# Patient Record
Sex: MALE | Race: WHITE | NOT HISPANIC OR LATINO | Employment: FULL TIME | URBAN - NONMETROPOLITAN AREA
[De-identification: names, ages, dates, MRNs, and addresses within clinical notes are randomized per-mention and may not be internally consistent; named-entity substitution may affect disease eponyms.]

---

## 2017-02-09 ENCOUNTER — APPOINTMENT (OUTPATIENT)
Dept: PHYSICAL THERAPY | Facility: CLINIC | Age: 47
End: 2017-02-09
Payer: COMMERCIAL

## 2017-02-09 PROCEDURE — 97112 NEUROMUSCULAR REEDUCATION: CPT

## 2017-02-09 PROCEDURE — 97163 PT EVAL HIGH COMPLEX 45 MIN: CPT

## 2017-02-09 PROCEDURE — 97110 THERAPEUTIC EXERCISES: CPT

## 2017-02-13 ENCOUNTER — APPOINTMENT (OUTPATIENT)
Dept: PHYSICAL THERAPY | Facility: CLINIC | Age: 47
End: 2017-02-13
Payer: COMMERCIAL

## 2017-02-13 PROCEDURE — 97112 NEUROMUSCULAR REEDUCATION: CPT

## 2017-02-14 ENCOUNTER — APPOINTMENT (OUTPATIENT)
Dept: PHYSICAL THERAPY | Facility: CLINIC | Age: 47
End: 2017-02-14
Payer: COMMERCIAL

## 2017-02-14 PROCEDURE — 97010 HOT OR COLD PACKS THERAPY: CPT

## 2017-02-14 PROCEDURE — 97112 NEUROMUSCULAR REEDUCATION: CPT

## 2017-02-14 PROCEDURE — 97110 THERAPEUTIC EXERCISES: CPT

## 2017-02-16 ENCOUNTER — APPOINTMENT (OUTPATIENT)
Dept: PHYSICAL THERAPY | Facility: CLINIC | Age: 47
End: 2017-02-16
Payer: COMMERCIAL

## 2017-02-16 PROCEDURE — 97110 THERAPEUTIC EXERCISES: CPT

## 2017-02-20 ENCOUNTER — APPOINTMENT (OUTPATIENT)
Dept: PHYSICAL THERAPY | Facility: CLINIC | Age: 47
End: 2017-02-20
Payer: COMMERCIAL

## 2017-02-20 PROCEDURE — 97110 THERAPEUTIC EXERCISES: CPT

## 2017-02-21 ENCOUNTER — APPOINTMENT (OUTPATIENT)
Dept: PHYSICAL THERAPY | Facility: CLINIC | Age: 47
End: 2017-02-21
Payer: COMMERCIAL

## 2017-02-21 PROCEDURE — 97110 THERAPEUTIC EXERCISES: CPT

## 2017-02-23 ENCOUNTER — APPOINTMENT (OUTPATIENT)
Dept: PHYSICAL THERAPY | Facility: CLINIC | Age: 47
End: 2017-02-23
Payer: COMMERCIAL

## 2017-02-27 ENCOUNTER — APPOINTMENT (OUTPATIENT)
Dept: PHYSICAL THERAPY | Facility: CLINIC | Age: 47
End: 2017-02-27
Payer: COMMERCIAL

## 2017-02-27 PROCEDURE — 97110 THERAPEUTIC EXERCISES: CPT

## 2017-02-28 ENCOUNTER — APPOINTMENT (OUTPATIENT)
Dept: PHYSICAL THERAPY | Facility: CLINIC | Age: 47
End: 2017-02-28
Payer: COMMERCIAL

## 2017-02-28 PROCEDURE — 97110 THERAPEUTIC EXERCISES: CPT

## 2017-03-02 ENCOUNTER — APPOINTMENT (OUTPATIENT)
Dept: PHYSICAL THERAPY | Facility: CLINIC | Age: 47
End: 2017-03-02
Payer: COMMERCIAL

## 2017-03-02 PROCEDURE — 97110 THERAPEUTIC EXERCISES: CPT

## 2017-03-06 ENCOUNTER — APPOINTMENT (OUTPATIENT)
Dept: PHYSICAL THERAPY | Facility: CLINIC | Age: 47
End: 2017-03-06
Payer: COMMERCIAL

## 2017-03-06 PROCEDURE — 97110 THERAPEUTIC EXERCISES: CPT

## 2017-03-08 ENCOUNTER — APPOINTMENT (OUTPATIENT)
Dept: PHYSICAL THERAPY | Facility: CLINIC | Age: 47
End: 2017-03-08
Payer: COMMERCIAL

## 2017-03-08 PROCEDURE — 97110 THERAPEUTIC EXERCISES: CPT

## 2017-03-10 ENCOUNTER — APPOINTMENT (OUTPATIENT)
Dept: PHYSICAL THERAPY | Facility: CLINIC | Age: 47
End: 2017-03-10
Payer: COMMERCIAL

## 2017-03-10 PROCEDURE — 97110 THERAPEUTIC EXERCISES: CPT

## 2017-03-13 ENCOUNTER — APPOINTMENT (OUTPATIENT)
Dept: PHYSICAL THERAPY | Facility: CLINIC | Age: 47
End: 2017-03-13
Payer: COMMERCIAL

## 2017-03-13 PROCEDURE — 97110 THERAPEUTIC EXERCISES: CPT

## 2017-03-14 ENCOUNTER — APPOINTMENT (OUTPATIENT)
Dept: PHYSICAL THERAPY | Facility: CLINIC | Age: 47
End: 2017-03-14
Payer: COMMERCIAL

## 2017-03-15 ENCOUNTER — APPOINTMENT (OUTPATIENT)
Dept: PHYSICAL THERAPY | Facility: CLINIC | Age: 47
End: 2017-03-15
Payer: COMMERCIAL

## 2017-03-16 ENCOUNTER — APPOINTMENT (OUTPATIENT)
Dept: PHYSICAL THERAPY | Facility: CLINIC | Age: 47
End: 2017-03-16
Payer: COMMERCIAL

## 2017-03-16 PROCEDURE — 97110 THERAPEUTIC EXERCISES: CPT

## 2017-03-21 ENCOUNTER — APPOINTMENT (OUTPATIENT)
Dept: PHYSICAL THERAPY | Facility: CLINIC | Age: 47
End: 2017-03-21
Payer: COMMERCIAL

## 2017-03-21 PROCEDURE — 97110 THERAPEUTIC EXERCISES: CPT

## 2017-03-22 ENCOUNTER — APPOINTMENT (OUTPATIENT)
Dept: PHYSICAL THERAPY | Facility: CLINIC | Age: 47
End: 2017-03-22
Payer: COMMERCIAL

## 2017-03-22 PROCEDURE — 97110 THERAPEUTIC EXERCISES: CPT

## 2017-03-23 ENCOUNTER — APPOINTMENT (OUTPATIENT)
Dept: PHYSICAL THERAPY | Facility: CLINIC | Age: 47
End: 2017-03-23
Payer: COMMERCIAL

## 2017-03-23 PROCEDURE — 97110 THERAPEUTIC EXERCISES: CPT

## 2017-03-28 ENCOUNTER — APPOINTMENT (OUTPATIENT)
Dept: PHYSICAL THERAPY | Facility: CLINIC | Age: 47
End: 2017-03-28
Payer: COMMERCIAL

## 2017-03-28 PROCEDURE — 97110 THERAPEUTIC EXERCISES: CPT

## 2017-03-30 ENCOUNTER — APPOINTMENT (OUTPATIENT)
Dept: PHYSICAL THERAPY | Facility: CLINIC | Age: 47
End: 2017-03-30
Payer: COMMERCIAL

## 2017-03-30 PROCEDURE — 97110 THERAPEUTIC EXERCISES: CPT

## 2017-08-22 ENCOUNTER — ALLSCRIPTS OFFICE VISIT (OUTPATIENT)
Dept: OTHER | Facility: OTHER | Age: 47
End: 2017-08-22

## 2017-09-05 ENCOUNTER — ALLSCRIPTS OFFICE VISIT (OUTPATIENT)
Dept: OTHER | Facility: OTHER | Age: 47
End: 2017-09-05

## 2017-09-05 DIAGNOSIS — E66.9 OBESITY: ICD-10-CM

## 2017-09-05 DIAGNOSIS — I10 ESSENTIAL (PRIMARY) HYPERTENSION: ICD-10-CM

## 2017-09-05 DIAGNOSIS — Q98.4 KLINEFELTER SYNDROME: ICD-10-CM

## 2017-09-05 DIAGNOSIS — E11.9 TYPE 2 DIABETES MELLITUS WITHOUT COMPLICATIONS (HCC): ICD-10-CM

## 2017-09-06 ENCOUNTER — LAB CONVERSION - ENCOUNTER (OUTPATIENT)
Dept: OTHER | Facility: OTHER | Age: 47
End: 2017-09-06

## 2017-09-06 LAB
FSH (HISTORICAL): 8.57 (ref 1.27–19.26)
LUTEINIZING HORMONE (HISTORICAL): 7.91 (ref 1.24–8.62)

## 2017-09-24 ENCOUNTER — HOSPITAL ENCOUNTER (EMERGENCY)
Facility: HOSPITAL | Age: 47
Discharge: HOME | End: 2017-09-24
Attending: EMERGENCY MEDICINE | Admitting: EMERGENCY MEDICINE
Payer: COMMERCIAL

## 2017-09-24 ENCOUNTER — APPOINTMENT (EMERGENCY)
Dept: RADIOLOGY | Facility: HOSPITAL | Age: 47
End: 2017-09-24
Payer: COMMERCIAL

## 2017-09-24 VITALS
TEMPERATURE: 97.4 F | SYSTOLIC BLOOD PRESSURE: 126 MMHG | HEART RATE: 52 BPM | WEIGHT: 315 LBS | RESPIRATION RATE: 33 BRPM | DIASTOLIC BLOOD PRESSURE: 70 MMHG | OXYGEN SATURATION: 96 %

## 2017-09-24 DIAGNOSIS — R07.89 CHEST WALL PAIN: Primary | ICD-10-CM

## 2017-09-24 LAB
ALBUMIN SERPL BCP-MCNC: 3.6 G/DL (ref 3.5–5)
ALP SERPL-CCNC: 54 U/L (ref 46–116)
ALT SERPL W P-5'-P-CCNC: 59 U/L (ref 12–78)
ANION GAP SERPL CALCULATED.3IONS-SCNC: 10 MMOL/L (ref 4–13)
AST SERPL W P-5'-P-CCNC: 25 U/L (ref 5–45)
BASOPHILS # BLD AUTO: 0.04 THOUSANDS/ΜL (ref 0–0.1)
BASOPHILS NFR BLD AUTO: 1 % (ref 0–1)
BILIRUB SERPL-MCNC: 1.1 MG/DL (ref 0.2–1)
BUN SERPL-MCNC: 19 MG/DL (ref 5–25)
CALCIUM SERPL-MCNC: 8.4 MG/DL (ref 8.3–10.1)
CHLORIDE SERPL-SCNC: 105 MMOL/L (ref 100–108)
CO2 SERPL-SCNC: 26 MMOL/L (ref 21–32)
CREAT SERPL-MCNC: 1.11 MG/DL (ref 0.6–1.3)
EOSINOPHIL # BLD AUTO: 0.35 THOUSAND/ΜL (ref 0–0.61)
EOSINOPHIL NFR BLD AUTO: 5 % (ref 0–6)
ERYTHROCYTE [DISTWIDTH] IN BLOOD BY AUTOMATED COUNT: 13.1 % (ref 11.6–15.1)
GFR SERPL CREATININE-BSD FRML MDRD: 79 ML/MIN/1.73SQ M
GLUCOSE SERPL-MCNC: 219 MG/DL (ref 65–140)
HCT VFR BLD AUTO: 39 % (ref 36.5–49.3)
HGB BLD-MCNC: 13.4 G/DL (ref 12–17)
LYMPHOCYTES # BLD AUTO: 3.27 THOUSANDS/ΜL (ref 0.6–4.47)
LYMPHOCYTES NFR BLD AUTO: 44 % (ref 14–44)
MCH RBC QN AUTO: 29.6 PG (ref 26.8–34.3)
MCHC RBC AUTO-ENTMCNC: 34.4 G/DL (ref 31.4–37.4)
MCV RBC AUTO: 86 FL (ref 82–98)
MONOCYTES # BLD AUTO: 0.5 THOUSAND/ΜL (ref 0.17–1.22)
MONOCYTES NFR BLD AUTO: 7 % (ref 4–12)
NEUTROPHILS # BLD AUTO: 3.16 THOUSANDS/ΜL (ref 1.85–7.62)
NEUTS SEG NFR BLD AUTO: 43 % (ref 43–75)
PLATELET # BLD AUTO: 179 THOUSANDS/UL (ref 149–390)
PMV BLD AUTO: 10.7 FL (ref 8.9–12.7)
POTASSIUM SERPL-SCNC: 3.8 MMOL/L (ref 3.5–5.3)
PROT SERPL-MCNC: 6.8 G/DL (ref 6.4–8.2)
RBC # BLD AUTO: 4.53 MILLION/UL (ref 3.88–5.62)
SODIUM SERPL-SCNC: 141 MMOL/L (ref 136–145)
TROPONIN I SERPL-MCNC: <0.02 NG/ML
TROPONIN I SERPL-MCNC: <0.02 NG/ML
WBC # BLD AUTO: 7.32 THOUSAND/UL (ref 4.31–10.16)

## 2017-09-24 PROCEDURE — 84484 ASSAY OF TROPONIN QUANT: CPT | Performed by: EMERGENCY MEDICINE

## 2017-09-24 PROCEDURE — 85025 COMPLETE CBC W/AUTO DIFF WBC: CPT | Performed by: EMERGENCY MEDICINE

## 2017-09-24 PROCEDURE — 93005 ELECTROCARDIOGRAM TRACING: CPT | Performed by: EMERGENCY MEDICINE

## 2017-09-24 PROCEDURE — 99285 EMERGENCY DEPT VISIT HI MDM: CPT

## 2017-09-24 PROCEDURE — 80053 COMPREHEN METABOLIC PANEL: CPT | Performed by: EMERGENCY MEDICINE

## 2017-09-24 PROCEDURE — 71020 HB CHEST X-RAY 2VW FRONTAL&LATL: CPT

## 2017-09-24 PROCEDURE — 36415 COLL VENOUS BLD VENIPUNCTURE: CPT | Performed by: EMERGENCY MEDICINE

## 2017-09-24 PROCEDURE — 96374 THER/PROPH/DIAG INJ IV PUSH: CPT

## 2017-09-24 RX ORDER — KETOROLAC TROMETHAMINE 30 MG/ML
30 INJECTION, SOLUTION INTRAMUSCULAR; INTRAVENOUS ONCE
Status: COMPLETED | OUTPATIENT
Start: 2017-09-24 | End: 2017-09-24

## 2017-09-24 RX ORDER — IBUPROFEN 800 MG/1
800 TABLET ORAL EVERY 6 HOURS PRN
Qty: 30 TABLET | Refills: 0 | Status: SHIPPED | OUTPATIENT
Start: 2017-09-24 | End: 2022-02-05 | Stop reason: HOSPADM

## 2017-09-24 RX ADMIN — KETOROLAC TROMETHAMINE 30 MG: 30 INJECTION, SOLUTION INTRAMUSCULAR at 02:40

## 2017-09-25 ENCOUNTER — ALLSCRIPTS OFFICE VISIT (OUTPATIENT)
Dept: OTHER | Facility: OTHER | Age: 47
End: 2017-09-25

## 2017-09-25 LAB
ATRIAL RATE: 58 BPM
ATRIAL RATE: 59 BPM
P AXIS: 26 DEGREES
P AXIS: 29 DEGREES
PR INTERVAL: 154 MS
PR INTERVAL: 154 MS
QRS AXIS: 12 DEGREES
QRS AXIS: 9 DEGREES
QRSD INTERVAL: 94 MS
QRSD INTERVAL: 96 MS
QT INTERVAL: 426 MS
QT INTERVAL: 434 MS
QTC INTERVAL: 421 MS
QTC INTERVAL: 426 MS
T WAVE AXIS: 2 DEGREES
T WAVE AXIS: 5 DEGREES
VENTRICULAR RATE: 58 BPM
VENTRICULAR RATE: 59 BPM

## 2017-10-11 ENCOUNTER — GENERIC CONVERSION - ENCOUNTER (OUTPATIENT)
Dept: INTERNAL MEDICINE CLINIC | Facility: CLINIC | Age: 47
End: 2017-10-11

## 2017-10-11 ENCOUNTER — GENERIC CONVERSION - ENCOUNTER (OUTPATIENT)
Dept: OTHER | Facility: OTHER | Age: 47
End: 2017-10-11

## 2017-10-11 DIAGNOSIS — E29.1 TESTICULAR HYPOFUNCTION: ICD-10-CM

## 2017-10-11 DIAGNOSIS — E11.65 TYPE 2 DIABETES MELLITUS WITH HYPERGLYCEMIA (HCC): ICD-10-CM

## 2017-10-11 DIAGNOSIS — E11.9 TYPE 2 DIABETES MELLITUS WITHOUT COMPLICATIONS (HCC): ICD-10-CM

## 2017-10-27 NOTE — CONSULTS
Assessment  1  Klinefelter's syndrome (758 7) (Q98 4)  2  Hypogonadism, male (257 2) (E29 1)1   3  1   4  Sleep disturbances (780 50) (G47 9)  5  Diabetes mellitus type 2, uncontrolled (250 02) (E11 65)     1 Amended By: Alfredo Farias; Sep 26 2017 3:34 PM EST    Plan  Hypertension    · Stop: Losartan Potassium 50 MG Oral Tablet  Rx By: Rip Mars; Dispense: 30 Days ; #:30 Tablet; Refill: 2;For: Hypertension;   JEFFREY = N; Verified Transmission to [facility]; Last Updated By:   Juan Antonio Gresham; 9/25/2017 8:31:40 AM  Hypertension, Klinefelter's syndrome    · (1) T4, FREE; Status:Active; Requested ARLETH:26STG6540;   Perform:[facility]; Due:24Xbb6555; Ordered; For:Hypertension,   Klinefelter's syndrome; Ordered By:Carl Harper;   · (1) TSH; Status:Active; Requested IAT:37KVC0827;   Perform:[facility]; Due:54Bxt6039; Ordered; For:Hypertension,   Klinefelter's syndrome; Ordered By:Carl Harper; Hypogonadism    · Follow-up visit in 2 months Evaluation and Treatment  Follow-up  Status: Complete   Done: 91UEX5036  Ordered; For: Hypogonadism;  Ordered By: Alfredo Farias  Performed:   Due:   64JXI4429; Last Updated By: Janene Castellanos; 9/25/2017 9:32:43 AM  Hypogonadism, Klinefelter's syndrome    · (1) TESTOSTERONE, FREE (DIRECT) AND TOTAL; Status:Active; Requested  HYT:04UZC1372;   Perform:[facility]; Due:56Uda4039; Ordered; For:Hypogonadism,   Klinefelter's syndrome; Ordered By:Carl Harper;  Klinefelter's syndrome    · (1) CBC/PLT/DIFF; Status:Active; Requested QUE:26DFU5161;   Perform:[facility]; Due:64Ikz3627; Ordered; For:Klinefelter's syndrome;   Ordered By:Carl Harper;    Discussion/Summary  Hypogonadism secondary to Klinefelter syndrome-  repeat labs including total and free testosterone,  thyroid function test and CBC  I have suggested starting testosterone hormone replacement after that  Discussed options of gels,  patches or injection    he is agreeable to starting AndroGel, patient handout given  after labs will start him on AndroGel,  counseled on side effects including need for monitoring hemoglobin hematocrit, urinary symptoms, edema and risk of blood clots  will repeat total and free testosterone as well as hemoglobin and hematocrit after he has been on a consistent dose for about 4 weeks  consider DEXA scan in the future   sleep disturbances -likely sleep apnea -  suggested evaluation with a sleep study, he is reluctant, suggest discussing with primary care   uncontrolled type 2 diabetes - not taking medications on a regular basis secondary to side effects of diarrhea with metformin, not checking blood sugars, being managed by primary care,  advised to follow up with primary care regarding further management   Counseling Documentation With Imm: The patient was counseled regarding diagnostic results,-- instructions for management,-- risk factor reductions,-- impressions,-- risks and benefits of treatment options,-- importance of compliance with treatment  Medication SE Review and Pt Understands Tx: Possible side effects of new medications were reviewed with the patient/guardian today  The treatment plan was reviewed with the patient/guardian  The patient/guardian understands and agrees with the treatment plan   Patient's Capacity to Self-Care: Patient is able to Self-Care  Self Referrals:   Self Referrals: No      Chief Complaint  Chief Complaint Free Text Note Form: Consult  History of Present Illness  HPI: 78-year-old gentleman referred here for evaluation of hypogonadism  He states that he was diagnosed with Klinefelter syndrome in 1989 - during workup for small testicles noted on physical exam as well as infertility  was on testosterone injections for 2 years - Stopped taking it after his physician retired  he complains of decrease in libido many years , ED ? , c/o fatigue once or twice a month- unchanged  stable - trying to loose weight , lost 50 lbs in 3 years   issues , snores - has never been tested for sleep apnea  h/o CAD   h/o fragility fracture , issues , doesn?t have any drive to do anything   history of type 2 diabetes -recently diagnosed -started on oral hypoglycemics by primary care, not taking meds on a regular basis, does not check his blood sugars at home       Review of Systems  Endo Adult ROS Male New Patient:   Constitutional/General: no change in ring size,-- no change in shoe size,-- no chills,-- no dizziness,-- no fainting,-- no fatigue,-- no fever,-- no forgetfulness,-- no headache,-- loss of sleep,-- no recent weight loss,-- no nervousness,-- no numbness,-- no temperature intolerance,-- no excessive sweating-- and-- no weight gain  Muscle/Joint/Bone: no arm pain,-- no back pain,-- no hip pain,-- no leg pain,-- no foot pain,-- no neck pain,-- no hand pain,-- no shoulder pain,-- no arm weakness,-- no back weakness,-- no hip weakness,-- no leg weakness,-- no foot weakness,-- no neck weakness,-- no hand weakness,-- no shoulder weakness,-- no arm numbness,-- no back numbness,-- no hip numbness,-- no leg numbness,-- no foot numbness,-- no neck numbness,-- no hand numbness-- and-- no shoulder numbness  Gastrointestinal: no constipation,-- no diarrhea,-- no excessive hunger,-- no excessive thirst,-- no nausea,-- no poor appetite,-- no rectal bleeding,-- no stomach pain,-- no vomiting-- and-- no vomiting blood  Cardiovascular: chest pain-- and-- hypertension, but-- no irregular heart beat,-- no hypotension,-- no poor circulation,-- no rapid heart beat-- and-- no ankle swelling  Eye/Ear/Nose/Throat: no bleeding gums,-- no blurred vision,-- no difficulty swallowing,-- no double vision,-- no gritty eyes,-- no hoarseness,-- no hearing loss,-- no persistent cough,-- no sinus problems,-- not seeing flashes-- and-- not seeing halos     Skin: no easy bruising,-- no hives,-- no itching,-- no change in a mole,-- no rashes,-- no scar-- and-- no slow healing sores  Genitourinary no blood in urine,-- no frequent urination,-- no night time urination-- and-- no painful urination  Genitourinary - Reproductive no breast lump-- and-- no erection difficulties  ROS Reviewed:   ROS reviewed  Active Problems  1  Diabetes mellitus, type 2 (250 00) (E11 9)  2  Hypertension (401 9) (I10)  3  Hypogonadism  4  Nephrolithiasis (592 0) (N20 0)  5  Obesity (278 00) (E66 9)  6  Refused influenza vaccine (V64 06) (Z28 21)  7  Screening for depression (V79 0) (Z13 89)    Past Medical History  Active Problems And Past Medical History Reviewed: The active problems and past medical history were reviewed and updated today  Surgical History  1  History of Cystoscopy With Insertion Of Ureteral Stent  2  History of Cystoscopy With Ureteroscopy With Removal Of Calculus  3  History of Cystoscopy With Ureteroscopy With Removal Of Calculus  4  History of Knee Surgery  5  History of Tonsillectomy  Surgical History Reviewed: The surgical history was reviewed and updated today  Family History  Mother   1  Family history of Diabetes Mellitus (V18 0)  2  Family history of Hypertension (V17 49)  3  Family history of Stroke Syndrome (V17 1)  Father   4  Family history of Hypertension (V17 49)  Family History Reviewed: The family history was reviewed and updated today  Social History   · Always uses seat belt   · Always uses sunscreen   · Caffeine use (V49 89) (F15 90)   · Dental care, regularly   · Feels safe at home   · Lives independently with spouse   · Marital History - Currently    · Never a smoker   · No illicit drug use   · Occasional alcohol use  Social History Reviewed: The social history was reviewed and updated today  Current Meds  1  GlyBURIDE-MetFORMIN 2 5-500 MG Oral Tablet; TAKE 1 TABLET DAILY; Therapy: 01SDI8960 to (Gaby Fernandez)  Requested for: 13Zye8329; Last   Rx:25Pav3018 Ordered  2   Losartan Potassium 25 MG Oral Tablet; Take one tablet daily; Therapy: 19XIF7557 to (Ewa Peon)  Requested for: 79Wjp2355; Last   Rx:69Kos4527 Ordered  3  Losartan Potassium 50 MG Oral Tablet; TAKE 1 TABLET DAILY; Therapy: 77Ppy7618 to (Verdie Meter)  Requested for: 68Hvf7466; Last   Rx:72Pco0423 Ordered  Medication List Reviewed: The medication list was reviewed and updated today  Allergies  1  Penicillins    Vitals  Vital Signs    Recorded: 96LCB0480 08:31AM   Heart Rate 80   Systolic 288   Diastolic 80   Height 6 ft 1 in   Weight 333 lb 8 0 oz   BMI Calculated 44   BSA Calculated 2 67     Physical Exam    Constitutional   General appearance: No acute distress, well appearing and well nourished  Eyes   Conjunctiva and lids: No swelling, erythema, or discharge  Pupils: Equal, round and reactive to light  The sclera are anicteric  Extraocular movements are intact  Ears, Nose, Mouth, and Throat   External inspection of ears, nose and lips: Normal     Exam of Head: The head is atraumatic and normocephalic  Neck: The neck is supple  The thyroid is normal in size with no palpable nodules  Pulmonary   Auscultation of lungs: Clear to auscultation bilaterally with normal chest expansion  Cardiovascular   Auscultation of heart: Normal rate and rhythm with no murmurs, gallops or rubs  Examination of extremities for edema and/or varicosities: Normal     Abdomen   Abdomen: Abdomen is soft, non-tender with normal bowel sounds  Lymphatic   Palpation of lymph nodes: No supraclavicular or suboccipital lymphadenopathy  Musculoskeletal   Inspection/palpation of joints, bones, and muscles: Muscle bulk and tone is normal     Skin   Skin and subcutaneous tissue: Abnormal  -- minimal facial hair  Neurologic   Motor Strength: Strength is 5/5 bilaterally      Psychiatric   Orientation to person, place and time: Normal     Mood and affect: Affect and attention span are normal        Results/Data  Office Record Review: I have reviewed the office records and my summary is as follows: reviewed office notes from urologist from February 2014 regarding nephrolithiasis   (1) PSA (SCREEN) (Dx V76 44 Screen for Prostate Cancer) 06Hqe5636 12:00AM      Test Name Result Flag Reference   PSA 0 09  0 0-4 0     (Q) [facility] AND  45Aam5210 12:00AM      Test Name Result Flag Reference   [facility] 8 57  1 27-19 26   LH 7 91  1 24-8 62     (1) PROLACTIN 84Zcr7151 12:00AM      Test Name Result Flag Reference   PROLACTIN 2 97  2 64-13 13     (1) TESTOSTERONE 20Mnk5327 12:00AM      Test Name Result Flag Reference   TESTOSTERONE 0 14 L 1 75-7 81     (1) BASIC METABOLIC PROFILE 49QJD6938 12:00AM      Test Name Result Flag Reference   GLUCOSE,RANDM 228 H 65-99   SODIUM 139  134-143   POTASSIUM 4 1  3 5-5 5   CHLORIDE 105     CARBON DIOXIDE 25  21 0-31 0   ANION GAP (CALC) 13 1     BLOOD UREA NITROGEN 19  7-25   CREATININE 0 88  0 7-1 3   CALCIUM 9 3  8 6-10 5   eGFR African American >60  >60   eGFR Non-African American >60  >60     Future Appointments    Date/Time Provider Specialty Site   10/11/2017 04:30 PM Milad Marshall DO Internal Medicine [address]   11/22/2017 08:30 AM Lynn Larry, [facility]     Signatures   Electronically signed by : MARYBEL Umaña ; Sep 26 2017  3:33PM EST                       (Author)    Electronically signed by : MARYBEL Umaña ; Sep 26 2017  3:34PM EST                       (Author)

## 2017-11-22 ENCOUNTER — ALLSCRIPTS OFFICE VISIT (OUTPATIENT)
Dept: OTHER | Facility: OTHER | Age: 47
End: 2017-11-22

## 2018-01-03 DIAGNOSIS — E66.9 OBESITY: ICD-10-CM

## 2018-01-03 DIAGNOSIS — I10 ESSENTIAL (PRIMARY) HYPERTENSION: ICD-10-CM

## 2018-01-03 DIAGNOSIS — E11.9 TYPE 2 DIABETES MELLITUS WITHOUT COMPLICATIONS (HCC): ICD-10-CM

## 2018-01-11 ENCOUNTER — ALLSCRIPTS OFFICE VISIT (OUTPATIENT)
Dept: OTHER | Facility: OTHER | Age: 48
End: 2018-01-11

## 2018-01-11 NOTE — MISCELLANEOUS
Provider Comments  Provider Comments:   PATIENT NO SHOWED FOR 11- APPT  Signatures   Electronically signed by :  Corina Ng; Nov 22 2017  8:55AM EST                       (Author)

## 2018-01-13 NOTE — PROGRESS NOTES
Assessment   1  Diabetes mellitus, type 2 (250 00) (E11 9)  2  History of renal calculi (V13 01) (Z87 442)1   3  Obesity (278 00) (E66 9)1      1 Amended By: Renu Harden; Jan 11 2018 9:42 PM EST      Plan   Diabetes mellitus type 2, uncontrolled    · Stop: GlyBURIDE 5 MG Oral Tablet  Diabetes mellitus, type 2    · Stop: GlyBURIDE-MetFORMIN 2 5-500 MG Oral Tablet   · (1) HEMOGLOBIN A1C; Status:Active; Requested CALLUM:40XVV7514;   Diabetes mellitus, type 2, Hypertension    · Follow-up visit in 4 Months Evaluation and Treatment  Follow-up  Status: Hold For - Scheduling     Requested for: 88DWO7060  Diabetes mellitus, type 2, Obesity    · (1) LDL,DIRECT; Status:Active; Requested JGS:74LIC4176;   Hypertension    · (1) COMPREHENSIVE METABOLIC PANEL; Status:Active; Requested ASE:53XCO2082; Discussion/Summary   Discussion Summary:    Patient agrees to work on diet changes and at least portion control  drink more water  He is agreeable to labs and said he has to get an xray for nephrology  He is still hoping to move [address] t some point and hs started workng towards it  Continue blood pressure rx  1              Medication SE Review and Pt Understands Tx: Possible side effects of new medications were reviewed with the patient/guardian today  The treatment plan was reviewed with the patient/guardian  The patient/guardian understands and agrees with the treatment plan             Self Referrals:    Self Referrals: No       1 Amended By: Renu Harden; Jan 11 2018 9:46 PM EST      Chief Complaint   Chief Complaint Free Text Note Form: Pt presents today for a follow up visit  Pt states that he no longer uses glyburide, it caused him to feel fatigued and have heartburn  States that he currently only takes losartan, unsure if he will start androgel  No new allergies reported  Notes that he has difficulty committing to routines with his current career  Has been drinking water, and taking steps to healthier lifestyle  History of Present Illness   HPI: Patient stopped glyburide because he thinks it caused heartburn although now he is wondering if it was his diet  He does not check 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  No chest pain or sob  He has followup with nephrology upcoming  No n/v/d 1        1 Amended By: Maria Fernanda López; Jan 11 2018 9:40 PM EST      Review of Systems   Complete-Male:      Constitutional:1  No fever or chills, feels well, no tiredness, no recent weight gain or weight loss1   Eyes:1  No complaints of eye pain, no red eyes, no discharge from eyes, no itchy eyes1   ENT:1  no complaints of earache, no hearing loss, no nosebleeds, no nasal discharge, no sore throat, no hoarseness1   Cardiovascular: 1  No complaints of slow heart rate, no fast heart rate, no chest pain, no palpitations, no leg claudication, no lower extremity1   Respiratory:1  No complaints of shortness of breath, no wheezing, no cough, no SOB on exertion, no orthopnea or PND1   Gastrointestinal:1  No complaints of abdominal pain, no constipation, no nausea or vomiting, no diarrhea or bloody stools1   Genitourinary:1  No complaints of dysuria, no incontinence, no hesitancy, no nocturia, no genital lesion, no testicular pain1   Musculoskeletal:1  No complaints of arthralgia, no myalgias, no joint swelling or stiffness, no limb pain or swelling1   Integumentary:1  No complaints of skin rash or skin lesions, no itching, no skin wound, no dry skin1   Neurological:1  No compliants of headache, no confusion, no convulsions, no numbness or tingling, no dizziness or fainting, no limb weakness, no difficulty walking1   Psychiatric:1  Is not suicidal, no sleep disturbances, no anxiety or depression, no change in personality, no emotional problems1         Endocrine:1  No complaints of proptosis, no hot flashes, no muscle weakness, no erectile dysfunction, no deepening of the voice, no feelings of weakness1   Hematologic/Lymphatic:1  No complaints of swollen glands, no swollen glands in the neck, does not bleed easily, no easy bruising1         1 Amended By: Rhonda Jacobs; Jan 11 2018 9:42 PM EST      Active Problems   1  Diabetes mellitus type 2, uncontrolled (250 02) (E11 65)  2  Diabetes mellitus, type 2 (250 00) (E11 9)  3  Hypertension (401 9) (I10)  4  Hypogonadism, male (257 2) (E29 1)  5  Klinefelter's syndrome (758 7) (Q98 4)  6  Nephrolithiasis (592 0) (N20 0)  7  Obesity (278 00) (E66 9)  8  Refused influenza vaccine (V64 06) (Z28 21)  9  Screening for depression (V79 0) (Z13 89)  10  Sleep disturbances (780 50) (G47 9)    Past Medical History   Active Problems And Past Medical History Reviewed: The active problems and past medical history were reviewed and updated today  Surgical History   1  History of Cystoscopy With Insertion Of Ureteral Stent  2  History of Cystoscopy With Ureteroscopy With Removal Of Calculus  3  History of Cystoscopy With Ureteroscopy With Removal Of Calculus  4  History of Knee Surgery  5  History of Tonsillectomy  Surgical History Reviewed: The surgical history was reviewed and updated today  Family History   Mother   1  Family history of Diabetes Mellitus (V18 0)  2  Family history of Hypertension (V17 49)  3  Family history of Stroke Syndrome (V17 1)  Father   4  Family history of Hypertension (V17 49)  Family History Reviewed: The family history was reviewed and updated today  Social History    · Always uses seat belt   · Always uses sunscreen   · Caffeine use (V49 89) (F15 90)   · Dental care, regularly   · Feels safe at home   · Lives independently with spouse   · Marital History - Currently    · Never a smoker   · No illicit drug use   · Occasional alcohol use  Social History Reviewed: The social history was reviewed and updated today  The social history was reviewed and is unchanged        Current Meds   1  AndroGel Pump 20 25 MG/ACT (1 62%) Transdermal Gel; apply 1 pump to shoulders and upper     arms daily; Therapy: 54CWI9757 to (Evaluate:15Apr2018); Last Rx:34Swb6376 Ordered  2  GlyBURIDE 5 MG Oral Tablet; TAKE 1 TABLET DAILY; Therapy: 60LCT5719 to (60 124 37 75)  Requested for: 94BMD1564; Last Rx:85Ccw6750     Ordered  3  GlyBURIDE-MetFORMIN 2 5-500 MG Oral Tablet; TAKE 1 TABLET DAILY; Therapy: 19AAX8203 to (Evaluate:40Gxi1372)  Requested for: 71Ibo3885; Last Rx:49Lls2954     Ordered  4  Losartan Potassium 25 MG Oral Tablet; Take one tablet daily; Therapy: 92DJN3171 to (Abeworth Aga)  Requested for: 80Zef5144; Last Rx:70Vwb4866     Ordered  Medication List Reviewed: The medication list was reviewed and updated today  Allergies   1  Penicillins    Vitals   Vital Signs    Recorded: 03EOG1316 05:18PM Recorded: 46EWR7835 05:05PM   Temperature  96 6 F   Heart Rate  80   Systolic 681    Diastolic 74    Height  6 ft 1 in   Weight  338 lb 4 0 oz   BMI Calculated  44 63   BSA Calculated  2 69   O2 Saturation  98     Physical Exam        Constitutional      General appearance: No acute distress, well appearing and well nourished  Eyes      Conjunctiva and lids: No swelling, erythema, or discharge  Pupils and irises: Equal, round and reactive to light  Ears, Nose, Mouth, and Throat      External inspection of ears and nose: Normal        Otoscopic examination: Tympanic membrance translucent with normal light reflex  Canals patent without erythema  Nasal mucosa, septum, and turbinates: Normal without edema or erythema  Oropharynx: Normal with no erythema, edema, exudate or lesions  Pulmonary      Respiratory effort: No increased work of breathing or signs of respiratory distress  Auscultation of lungs: Clear to auscultation, equal breath sounds bilaterally, no wheezes, no rales, no rhonci         Cardiovascular      Palpation of heart: Normal PMI, no thrills  Auscultation of heart: Normal rate and rhythm, normal S1 and S2, without murmurs  Examination of extremities for edema and/or varicosities: Normal        Carotid pulses: Normal        Abdomen      Abdomen: Non-tender, no masses  Liver and spleen: No hepatomegaly or splenomegaly  Lymphatic      Palpation of lymph nodes in neck: No lymphadenopathy  Musculoskeletal      Gait and station: Normal        Digits and nails: Normal without clubbing or cyanosis  Inspection/palpation of joints, bones, and muscles: Normal        Skin      Skin and subcutaneous tissue: Normal without rashes or lesions  Neurologic      Cranial nerves: Cranial nerves 2-12 intact  Reflexes: 2+ and symmetric  Sensation: No sensory loss         Psychiatric      Orientation to person, place and time: Normal        Mood and affect: Normal           Signatures    Electronically signed by : Compa Minaya DO; Jan 11 2018  5:26PM EST                       (Author)     Electronically signed by : Compa Minaya DO; Jan 11 2018  9:46PM EST                       (Author)

## 2018-01-14 VITALS
HEART RATE: 80 BPM | BODY MASS INDEX: 41.75 KG/M2 | WEIGHT: 315 LBS | DIASTOLIC BLOOD PRESSURE: 80 MMHG | SYSTOLIC BLOOD PRESSURE: 114 MMHG | HEIGHT: 73 IN

## 2018-01-14 VITALS
BODY MASS INDEX: 41.75 KG/M2 | DIASTOLIC BLOOD PRESSURE: 82 MMHG | HEART RATE: 79 BPM | TEMPERATURE: 96.9 F | WEIGHT: 315 LBS | SYSTOLIC BLOOD PRESSURE: 164 MMHG | HEIGHT: 73 IN | OXYGEN SATURATION: 98 %

## 2018-01-15 VITALS
DIASTOLIC BLOOD PRESSURE: 70 MMHG | WEIGHT: 315 LBS | BODY MASS INDEX: 41.75 KG/M2 | OXYGEN SATURATION: 97 % | HEIGHT: 73 IN | HEART RATE: 81 BPM | SYSTOLIC BLOOD PRESSURE: 142 MMHG | TEMPERATURE: 96.4 F

## 2018-01-15 NOTE — CONSULTS
I had the pleasure of evaluating your patient, Jennifer Field  My full evaluation follows:      Chief Complaint  Consult  History of Present Illness  52year-old gentleman referred here for evaluation of hypogonadism  He states that he was diagnosed with Klinefelter syndrome in 1989 - during workup for small testicles noted on physical exam as well as infertility  was on testosterone injections for 2 years - Stopped taking it after his physician retired  he complains of decrease in libido many years , ED ? , c/o fatigue   Shaves once or twice a month- unchanged  weight stable - trying to loose weight , lost 50 lbs in 3 years   sleep issues , snores - has never been tested for sleep apnea  no h/o CAD   no h/o fragility fracture ,   mood issues , doesn't have any drive to do anything   history of type 2 diabetes -recently diagnosed -started on oral hypoglycemics by primary care, not taking meds on a regular basis, does not check his blood sugars at home       Review of Systems    Constitutional/General: no change in ring size, no change in shoe size, no chills, no dizziness, no fainting, no fatigue, no fever, no forgetfulness, no headache, loss of sleep, no recent weight loss, no nervousness, no numbness, no temperature intolerance, no excessive sweating and no weight gain  Muscle/Joint/Bone: no arm pain, no back pain, no hip pain, no leg pain, no foot pain, no neck pain, no hand pain, no shoulder pain, no arm weakness, no back weakness, no hip weakness, no leg weakness, no foot weakness, no neck weakness, no hand weakness, no shoulder weakness, no arm numbness, no back numbness, no hip numbness, no leg numbness, no foot numbness, no neck numbness, no hand numbness and no shoulder numbness  Gastrointestinal: no constipation, no diarrhea, no excessive hunger, no excessive thirst, no nausea, no poor appetite, no rectal bleeding, no stomach pain, no vomiting and no vomiting blood     Cardiovascular: chest pain and hypertension, but no irregular heart beat, no hypotension, no poor circulation, no rapid heart beat and no ankle swelling  Eye/Ear/Nose/Throat: no bleeding gums, no blurred vision, no difficulty swallowing, no double vision, no gritty eyes, no hoarseness, no hearing loss, no persistent cough, no sinus problems, not seeing flashes and not seeing halos  Skin: no easy bruising, no hives, no itching, no change in a mole, no rashes, no scar and no slow healing sores  Genitourinary no blood in urine, no frequent urination, no night time urination and no painful urination  Genitourinary - Reproductive no breast lump and no erection difficulties  ROS reviewed  Active Problems    1  Diabetes mellitus, type 2 (250 00) (E11 9)   2  Hypertension (401 9) (I10)   3  Hypogonadism   4  Nephrolithiasis (592 0) (N20 0)   5  Obesity (278 00) (E66 9)   6  Refused influenza vaccine (V64 06) (Z28 21)   7  Screening for depression (V79 0) (Z13 89)    Past Medical History    The active problems and past medical history were reviewed and updated today  Surgical History    · History of Cystoscopy With Insertion Of Ureteral Stent   · History of Cystoscopy With Ureteroscopy With Removal Of Calculus   · History of Cystoscopy With Ureteroscopy With Removal Of Calculus   · History of Knee Surgery   · History of Tonsillectomy    The surgical history was reviewed and updated today  Family History    · Family history of Diabetes Mellitus (V18 0)   · Family history of Hypertension (V17 49)   · Family history of Stroke Syndrome (V17 1)    · Family history of Hypertension (V17 49)    The family history was reviewed and updated today         Social History    · Always uses seat belt   · Always uses sunscreen   · Caffeine use (V49 89) (F15 90)   · Dental care, regularly   · Feels safe at home   · Lives independently with spouse   · Marital History - Currently    · Never a smoker   · No illicit drug use   · Occasional alcohol use  The social history was reviewed and updated today  Current Meds   1  GlyBURIDE-MetFORMIN 2 5-500 MG Oral Tablet; TAKE 1 TABLET DAILY; Therapy: 02APU9164 to ([facility])  Requested for: 22Wnv6317; Last   Rx:51Zpg0850 Ordered   2  Losartan Potassium 25 MG Oral Tablet; Take one tablet daily; Therapy: 00JVK5683 to (Natalie Areshiloh)  Requested for: 48Naq4954; Last   Rx:20Vnn1403 Ordered   3  Losartan Potassium 50 MG Oral Tablet; TAKE 1 TABLET DAILY; Therapy: 50Qkn2172 to (Evaluate:51Qxs6930)  Requested for: 98Svk0550; Last   Rx:[id number] Ordered    The medication list was reviewed and updated today  Allergies    1  Penicillins    Vitals   Recorded: 29RBK4040 08:31AM   Heart Rate 80   Systolic 316   Diastolic 80   Height 6 ft 1 in   Weight 333 lb 8 0 oz   BMI Calculated 44   BSA Calculated 2 67     Physical Exam    Constitutional   General appearance: No acute distress, well appearing and well nourished  Eyes   Conjunctiva and lids: No swelling, erythema, or discharge  Pupils: Equal, round and reactive to light  The sclera are anicteric  Extraocular movements are intact  Ears, Nose, Mouth, and Throat   External inspection of ears, nose and lips: Normal     Exam of Head: The head is atraumatic and normocephalic  Neck: The neck is supple  The thyroid is normal in size with no palpable nodules  Pulmonary   Auscultation of lungs: Clear to auscultation bilaterally with normal chest expansion  Cardiovascular   Auscultation of heart: Normal rate and rhythm with no murmurs, gallops or rubs  Examination of extremities for edema and/or varicosities: Normal     Abdomen   Abdomen: Abdomen is soft, non-tender with normal bowel sounds  Lymphatic   Palpation of lymph nodes: No supraclavicular or suboccipital lymphadenopathy      Musculoskeletal   Inspection/palpation of joints, bones, and muscles: Muscle bulk and tone is normal     Skin   Skin and subcutaneous tissue: Abnormal   minimal facial hair  Neurologic   Motor Strength: Strength is 5/5 bilaterally  Psychiatric   Orientation to person, place and time: Normal     Mood and affect: Affect and attention span are normal        Results/Data  I have reviewed the office records and my summary is as follows: reviewed office notes from urologist from February 2014 regarding nephrolithiasis  (1) PSA (SCREEN) (Dx V76 44 Screen for Prostate Cancer) 99Opw5998 12:00AM      Test Name Result Flag Reference   PSA 0 09  0 0-4 0     (Q) [facility] AND LH 90Jqs9190 12:00AM      Test Name Result Flag Reference   [facility] 8 57  1 27-19 26   LH 7 91  1 24-8 62     (1) PROLACTIN 60Mvq1556 12:00AM      Test Name Result Flag Reference   PROLACTIN 2 97  2 64-13 13     (1) TESTOSTERONE 35Rfi2690 12:00AM      Test Name Result Flag Reference   TESTOSTERONE 0 14 L 1 75-7 81     (1) BASIC METABOLIC PROFILE 51DTP4432 12:00AM      Test Name Result Flag Reference   GLUCOSE,RANDM 228 H 65-99   SODIUM 139  134-143   POTASSIUM 4 1  3 5-5 5   CHLORIDE 105     CARBON DIOXIDE 25  21 0-31 0   ANION GAP (CALC) 13 1     BLOOD UREA NITROGEN 19  7-25   CREATININE 0 88  0 7-1 3   CALCIUM 9 3  8 6-10 5   eGFR African American >60  >60   eGFR Non-African American >60  >60     Assessment    1  Klinefelter's syndrome (758 7) (Q98 4)   2  Hypogonadism, male (257 2) (E29 1)   3  Sleep disturbances (780 50) (G47 9)   4  Diabetes mellitus type 2, uncontrolled (250 02) (E11 65)    Plan  Hypertension    · Losartan Potassium 50 MG Oral Tablet   Rx By: Reina Mayes; Dispense: 30 Days ; #:30 Tablet; Refill: 2; For: Hypertension; JEFFREY = N; Verified Transmission to [facility]; Last Updated By: Gracy Kimble; 9/25/2017 8:31:40 AM  Hypertension, Klinefelter's syndrome    · (1) T4, FREE; Status:Active; Requested WPK:[id number];    Perform:[facility]; Due:75Kjq1580; Ordered;  For:Hypertension, Klinefelter's syndrome; Ordered By:Ashley Harper Sat;   · (1) TSH; Status:Active; Requested CIE:55VDJ4770;    Perform:[facility]; Due:02Ybx0699; Ordered;  For:Hypertension, Klinefelter's syndrome; Ordered By:Shawnee Harper; Hypogonadism    · Follow-up visit in 2 months Evaluation and Treatment  Follow-up  Status: Complete   Done: 70UHH3364   Ordered; For: Hypogonadism; Ordered By: Chuckie Cohen Performed:  Due: 30EEO2174; Last Updated By: Kush العراقي; 2017 9:32:43 AM  Hypogonadism, Klinefelter's syndrome    · (1) TESTOSTERONE, FREE (DIRECT) AND TOTAL; Status:Active; Requested  [id number];    Perform:[facility]; Due:59Zzs2707; Ordered;  For:Hypogonadism, Klinefelter's syndrome; Ordered By:Shawnee Harper;  Klinefelter's syndrome    · (1) CBC/PLT/DIFF; Status:Active; Requested HFY:[id number];    Perform:[facility]; Due:69Eba8450; Ordered; For:Klinefelter's syndrome; Ordered By:Shawnee Harper;    Discussion/Summary  Hypogonadism secondary to Klinefelter syndrome-  repeat labs including total and free testosterone,  thyroid function test and CBC  I have suggested starting testosterone hormone replacement after that  Discussed options of gels,  patches or injection  he is agreeable to starting AndroGel, patient handout given  after labs will start him on AndroGel,  counseled on side effects including need for monitoring hemoglobin hematocrit, urinary symptoms, edema and risk of blood clots  will repeat total and free testosterone as well as hemoglobin and hematocrit after he has been on a consistent dose for about 4 weeks     consider DEXA scan in the future   sleep disturbances -likely sleep apnea -  suggested evaluation with a sleep study, he is reluctant, suggest discussing with primary care   uncontrolled type 2 diabetes - not taking medications on a regular basis secondary to side effects of diarrhea with metformin, not checking blood sugars, being managed by primary care,  advised to follow up with primary care regarding further management   The patient was counseled regarding diagnostic results, instructions for management, risk factor reductions, impressions, risks and benefits of treatment options, importance of compliance with treatment  Possible side effects of new medications were reviewed with the patient/guardian today  The treatment plan was reviewed with the patient/guardian  The patient/guardian understands and agrees with the treatment plan   Patient is able to Self-Care  Self Referrals: No      Thank you very much for allowing me to participate in the care of this patient  If you have any questions, please do not hesitate to contact me        Future Appointments    Signatures   Electronically signed by : MARYBEL Henry ; Sep 26 2017  3:34PM EST                       (Author)

## 2018-01-22 VITALS — SYSTOLIC BLOOD PRESSURE: 126 MMHG | DIASTOLIC BLOOD PRESSURE: 70 MMHG

## 2018-01-22 VITALS
HEART RATE: 86 BPM | TEMPERATURE: 96.3 F | OXYGEN SATURATION: 96 % | HEIGHT: 73 IN | WEIGHT: 315 LBS | BODY MASS INDEX: 41.75 KG/M2

## 2018-01-23 VITALS
HEIGHT: 73 IN | HEART RATE: 80 BPM | SYSTOLIC BLOOD PRESSURE: 132 MMHG | TEMPERATURE: 96.6 F | OXYGEN SATURATION: 98 % | WEIGHT: 315 LBS | DIASTOLIC BLOOD PRESSURE: 74 MMHG | BODY MASS INDEX: 41.75 KG/M2

## 2018-02-16 ENCOUNTER — APPOINTMENT (OUTPATIENT)
Dept: RADIOLOGY | Facility: CLINIC | Age: 48
End: 2018-02-16
Payer: OTHER MISCELLANEOUS

## 2018-02-16 ENCOUNTER — APPOINTMENT (OUTPATIENT)
Dept: URGENT CARE | Facility: CLINIC | Age: 48
End: 2018-02-16
Payer: OTHER MISCELLANEOUS

## 2018-02-16 DIAGNOSIS — M25.531 WRIST PAIN, ACUTE, RIGHT: Primary | ICD-10-CM

## 2018-02-16 PROCEDURE — 73110 X-RAY EXAM OF WRIST: CPT

## 2018-02-16 PROCEDURE — 99283 EMERGENCY DEPT VISIT LOW MDM: CPT | Performed by: PHYSICIAN ASSISTANT

## 2018-02-16 PROCEDURE — G0382 LEV 3 HOSP TYPE B ED VISIT: HCPCS | Performed by: PHYSICIAN ASSISTANT

## 2018-02-22 ENCOUNTER — APPOINTMENT (OUTPATIENT)
Dept: URGENT CARE | Facility: CLINIC | Age: 48
End: 2018-02-22
Payer: OTHER MISCELLANEOUS

## 2018-02-22 ENCOUNTER — TELEPHONE (OUTPATIENT)
Dept: INTERNAL MEDICINE CLINIC | Facility: CLINIC | Age: 48
End: 2018-02-22

## 2018-02-22 ENCOUNTER — OFFICE VISIT (OUTPATIENT)
Dept: INTERNAL MEDICINE CLINIC | Facility: CLINIC | Age: 48
End: 2018-02-22
Payer: OTHER MISCELLANEOUS

## 2018-02-22 VITALS
WEIGHT: 315 LBS | HEART RATE: 73 BPM | DIASTOLIC BLOOD PRESSURE: 82 MMHG | OXYGEN SATURATION: 99 % | HEIGHT: 74 IN | TEMPERATURE: 97.3 F | SYSTOLIC BLOOD PRESSURE: 142 MMHG | BODY MASS INDEX: 40.43 KG/M2

## 2018-02-22 DIAGNOSIS — S69.91XS INJURY OF RIGHT WRIST, SEQUELA: ICD-10-CM

## 2018-02-22 DIAGNOSIS — L72.9 INFECTED CYST OF SKIN: Primary | ICD-10-CM

## 2018-02-22 DIAGNOSIS — E11.9 TYPE 2 DIABETES MELLITUS WITHOUT COMPLICATION, WITHOUT LONG-TERM CURRENT USE OF INSULIN (HCC): ICD-10-CM

## 2018-02-22 DIAGNOSIS — L08.9 INFECTED CYST OF SKIN: Primary | ICD-10-CM

## 2018-02-22 PROBLEM — S69.91XA INJURY OF RIGHT WRIST: Status: ACTIVE | Noted: 2018-02-22

## 2018-02-22 PROBLEM — G47.9 SLEEP DISTURBANCES: Status: ACTIVE | Noted: 2017-09-25

## 2018-02-22 PROBLEM — I10 HYPERTENSION: Status: ACTIVE | Noted: 2017-08-22

## 2018-02-22 PROBLEM — E29.1 HYPOGONADISM, MALE: Status: ACTIVE | Noted: 2017-09-26

## 2018-02-22 PROBLEM — Q98.4 KLINEFELTER'S SYNDROME: Status: ACTIVE | Noted: 2017-09-25

## 2018-02-22 PROBLEM — E66.9 OBESITY: Status: ACTIVE | Noted: 2017-08-22

## 2018-02-22 PROCEDURE — 99213 OFFICE O/P EST LOW 20 MIN: CPT | Performed by: PHYSICIAN ASSISTANT

## 2018-02-22 PROCEDURE — 99214 OFFICE O/P EST MOD 30 MIN: CPT | Performed by: INTERNAL MEDICINE

## 2018-02-22 RX ORDER — HYDROCHLOROTHIAZIDE 25 MG/1
TABLET ORAL
COMMUNITY
Start: 2018-01-15 | End: 2019-01-30 | Stop reason: ALTCHOICE

## 2018-02-22 RX ORDER — GLYBURIDE 5 MG/1
1 TABLET ORAL DAILY
COMMUNITY
Start: 2017-10-11 | End: 2018-04-25 | Stop reason: ALTCHOICE

## 2018-02-22 RX ORDER — LOSARTAN POTASSIUM 25 MG/1
1 TABLET ORAL DAILY
COMMUNITY
Start: 2017-09-05 | End: 2018-04-10 | Stop reason: SDUPTHER

## 2018-02-22 RX ORDER — GLYBURIDE-METFORMIN HYDROCHLORIDE 2.5; 5 MG/1; MG/1
1 TABLET ORAL DAILY
COMMUNITY
Start: 2017-09-05 | End: 2018-04-25 | Stop reason: ALTCHOICE

## 2018-02-22 RX ORDER — SULFAMETHOXAZOLE AND TRIMETHOPRIM 800; 160 MG/1; MG/1
1 TABLET ORAL 2 TIMES DAILY
Qty: 14 TABLET | Refills: 0 | Status: SHIPPED | OUTPATIENT
Start: 2018-02-22 | End: 2018-03-01

## 2018-02-22 RX ORDER — TESTOSTERONE 16.2 MG/G
GEL TRANSDERMAL
COMMUNITY
Start: 2017-10-13 | End: 2018-03-30 | Stop reason: SDUPTHER

## 2018-02-22 NOTE — PROGRESS NOTES
Assessment/Plan:       Diagnoses and all orders for this visit:    Infected cyst Referral to dr Rashel Braun for removal  Bactrim DS BID for 7 days    Injury of right wrist, sequela  Under workmens comp since occurred at work  He is trying to see Orthopedics and awaiting clearance from his employer    Type 2 diabetes mellitus without complication, without long-term current use of insulin ([address] )    Other orders  -     testosterone (ANDROGEL PUMP) 1 62 % TD gel pump; Place on the skin  -     glyBURIDE (DIABETA) 5 mg tablet; Take 1 tablet by mouth daily  -     hydrochlorothiazide (HYDRODIURIL) 25 mg tablet;   -     losartan (COZAAR) 25 mg tablet; Take 1 tablet by mouth daily  -     glyBURIDE-metFORMIN (GLUCOVANCE) 2 5-500 MG per tablet; Take 1 tablet by mouth daily         Patient ID: Beverly Dsouza is a 52 y o  male  HPI    The following portions of the patient's history were reviewed and updated as appropriate: Allergies   Allergen Reactions    Penicillins GI Intolerance     Past Medical History:   Diagnosis Date    Hypertension      Social History     Social History    Marital status: /Civil Union     Spouse name: N/A    Number of children: N/A    Years of education: N/A     Occupational History    Not on file  Social History Main Topics    Smoking status: Never Smoker    Smokeless tobacco: Current User    Alcohol use No    Drug use: No    Sexual activity: Not on file     Other Topics Concern    Not on file     Social History Narrative    No narrative on file     Family History   Problem Relation Age of Onset    Diabetes Mother        Review of Systems   Constitutional: Negative  HENT: Negative  Eyes: Negative  Respiratory: Negative  Cardiovascular: Negative  Gastrointestinal: Negative  Endocrine: Negative  Genitourinary: Negative  Musculoskeletal: Negative  Skin:        Red raised cyst back of left calf   Allergic/Immunologic: Negative  Neurological: Negative  Hematological: Negative  Psychiatric/Behavioral: Negative  /82   Pulse 73   Temp (!) 97 3 °F (36 3 °C) (Tympanic)   Ht 6' 2" (1 88 m)   Wt (!) 153 kg (337 lb 9 6 oz)   SpO2 99%   BMI 43 35 kg/m²      Physical Exam   Constitutional: He is oriented to person, place, and time  He appears well-developed and well-nourished  No distress  HENT:   Head: Normocephalic and atraumatic  Right Ear: External ear normal    Left Ear: External ear normal    Mouth/Throat: Oropharynx is clear and moist  No oropharyngeal exudate  Eyes: Conjunctivae and EOM are normal  Pupils are equal, round, and reactive to light  Neck: Normal range of motion  Neck supple  Cardiovascular: Normal rate, regular rhythm, normal heart sounds and intact distal pulses  Pulmonary/Chest: Effort normal and breath sounds normal    Abdominal: Soft  Bowel sounds are normal    Musculoskeletal: Normal range of motion  He exhibits edema  He exhibits no tenderness  Neurological: He is alert and oriented to person, place, and time  He has normal reflexes  He displays normal reflexes  No cranial nerve deficit  He exhibits normal muscle tone  Coordination normal    Skin: Skin is warm and dry  There is erythema  Cyst raised some warmth and erythema  No drainage    Fever blister upper list   Nursing note and vitals reviewed

## 2018-02-22 NOTE — PATIENT INSTRUCTIONS
Cyst   WHAT YOU NEED TO KNOW:   A cyst is a round, firm lump found almost anywhere on your body  Cysts may grow slowly but are not cancerous  Treatment is not needed if you have no symptoms  Cysts can be opened and drained if they become infected or cause problems  Cysts can grow larger and make it hard for you to do your daily activities  You may also need antibiotics if there is an infection  You may need surgery to remove the cyst completely  DISCHARGE INSTRUCTIONS:   Medicines:   · Antibiotics  may be given to treat or prevent an infection  · Take your medicine as directed  Contact your healthcare provider if you think your medicine is not helping or if you have side effects  Tell him of her if you are allergic to any medicine  Keep a list of the medicines, vitamins, and herbs you take  Include the amounts, and when and why you take them  Bring the list or the pill bottles to follow-up visits  Carry your medicine list with you in case of an emergency  Return to the emergency department if:   · You develop a fever  · The area around your cyst becomes swollen, red, and painful  · Your cyst continues to drain for 2 days after you start antibiotics  Care for your wound as directed: If you have had your cyst drained or removed, care for your wound as directed  Carefully wash the wound with soap and water  Dry the area and put on new, clean bandages as directed  Change your bandages when they get wet or dirty  Follow up with your healthcare provider as directed: Your wound may need to be checked if your cyst was removed in the emergency department  You may need to see a surgeon if your cyst could not be removed  Write down your questions so you remember to ask during your visits  © 2017 [employer]  Information is for End User's use only and may not be sold, redistributed or otherwise used for commercial purposes   All illustrations and images included in CareNotes® are the copyrighted property of [employer]  or [employer] Ros[employer]  The above information is an  only  It is not intended as medical advice for individual conditions or treatments  Talk to your doctor, nurse or pharmacist before following any medical regimen to see if it is safe and effective for you

## 2018-03-02 NOTE — PROGRESS NOTES
Please call the patient regarding his abnormal result   Calcium ,thyroid function test within normal range - I had also ordered testosterone - please check with lab

## 2018-03-15 ENCOUNTER — TELEPHONE (OUTPATIENT)
Dept: ENDOCRINOLOGY | Facility: CLINIC | Age: 48
End: 2018-03-15

## 2018-03-15 NOTE — TELEPHONE ENCOUNTER
Testosterone is low-he needs to be on testosterone hormone replacement, he has an upcoming appointment will discuss then

## 2018-03-15 NOTE — TELEPHONE ENCOUNTER
----- Message from Gurinder Chawla MD sent at 3/1/2018  7:29 PM EST -----  Please call the patient regarding his abnormal result   Calcium ,thyroid function test within normal range - I had also ordered testosterone - please check with lab

## 2018-03-29 LAB — HBA1C MFR BLD HPLC: 5.8 %

## 2018-03-30 ENCOUNTER — OFFICE VISIT (OUTPATIENT)
Dept: ENDOCRINOLOGY | Facility: CLINIC | Age: 48
End: 2018-03-30
Payer: COMMERCIAL

## 2018-03-30 VITALS
DIASTOLIC BLOOD PRESSURE: 88 MMHG | WEIGHT: 315 LBS | HEART RATE: 68 BPM | BODY MASS INDEX: 40.43 KG/M2 | HEIGHT: 74 IN | SYSTOLIC BLOOD PRESSURE: 140 MMHG

## 2018-03-30 DIAGNOSIS — I10 ESSENTIAL HYPERTENSION: ICD-10-CM

## 2018-03-30 DIAGNOSIS — E11.9 TYPE 2 DIABETES MELLITUS WITHOUT COMPLICATION, WITHOUT LONG-TERM CURRENT USE OF INSULIN (HCC): ICD-10-CM

## 2018-03-30 DIAGNOSIS — E29.1 HYPOGONADISM, MALE: Primary | ICD-10-CM

## 2018-03-30 DIAGNOSIS — Q98.4 KLINEFELTER'S SYNDROME: ICD-10-CM

## 2018-03-30 PROCEDURE — 99213 OFFICE O/P EST LOW 20 MIN: CPT | Performed by: NURSE PRACTITIONER

## 2018-03-30 RX ORDER — TESTOSTERONE 16.2 MG/G
20.25 GEL TRANSDERMAL EVERY MORNING
Qty: 30 ACTUATION | Refills: 3 | Status: SHIPPED | OUTPATIENT
Start: 2018-03-30 | End: 2019-01-30 | Stop reason: ALTCHOICE

## 2018-03-30 NOTE — LETTER
March 30, 2018     Yuri Farris DO  [address] [address] [place] [id number]    Patient: Heather Dixon   YOB: 1970   Date of Visit: 3/30/2018       Dear Dr Frannie Reed: Thank you for referring Moose Sweeney to me for evaluation  Below are my notes for this consultation  If you have questions, please do not hesitate to call me  I look forward to following your patient along with you  Sincerely,        JUAN PABLO Lassiter        CC: No Recipients  Mylene Lassiter  3/30/2018  8:42 AM  Sign at close encounter    Established Patient Progress Note       Chief Complaint   Patient presents with    Hypogonadism           History of Present Illness:     Heather Dixon is a 52 y o  male with a history of HTN, type 2 diabetes, and hypogonadism secondary to klinefelter syndrome  For the hypogonadism he was started on androgel 1 62% gel in January  He had labs completed one month after starting his testosterone replacement  His CBC, TSH, and T4 were all within normal range  His total and free testosterone were low at 163 and 3 7 respectively  He reports he has not been using the gel consistently and often skipping days due to the cost of the medication  He reports his work will start picking up again when the weather improves and he will be able to afford his medication and take consistently  He reports he does notice an improvement in his symptoms when he is using the medication consistently  He reports he has spoken with his PCP and they are comfortable managing his testosterone as he lives in [place] and will be unable to come into this office when his work schedule picks up in the next few weeks  He has a follow up appointment scheduled with his PCP May 10th  For there HTN he is taking Losartan and HCTZ  For the diabetes he does not take his medication consistently  Is being managed by his PCP          Patient Active Problem List   Diagnosis    Diabetes mellitus, type 2 ([facility] [address] )    Hypertension    Hypogonadism, male    Klinefelter's syndrome    Nephrolithiasis    Obesity    Sleep disturbances    Injury of right wrist    Infected cyst of skin      Past Medical History:   Diagnosis Date    Hypertension       Past Surgical History:   Procedure Laterality Date    KNEE SURGERY      STONE ANALYSIS (HISTORICAL)      TONSILECTOMY AND ADNOIDECTOMY        Family History   Problem Relation Age of Onset    Diabetes Mother      Social History   Substance Use Topics    Smoking status: Never Smoker    Smokeless tobacco: Current User    Alcohol use No     Allergies   Allergen Reactions    Penicillins GI Intolerance       Current Outpatient Prescriptions:     hydrochlorothiazide (HYDRODIURIL) 25 mg tablet, , Disp: , Rfl:     ibuprofen (MOTRIN) 800 mg tablet, Take 1 tablet by mouth every 6 (six) hours as needed (p r n  pain), Disp: 30 tablet, Rfl: 0    losartan (COZAAR) 25 mg tablet, Take 1 tablet by mouth daily, Disp: , Rfl:     testosterone (ANDROGEL PUMP) 1 62 % TD gel pump, Apply 1 actuation (20 25 mg total) topically every morning, Disp: 30 actuation, Rfl: 3    glyBURIDE (DIABETA) 5 mg tablet, Take 1 tablet by mouth daily, Disp: , Rfl:     glyBURIDE-metFORMIN (GLUCOVANCE) 2 5-500 MG per tablet, Take 1 tablet by mouth daily, Disp: , Rfl:     Review of Systems   Constitutional: Negative for activity change, appetite change and fatigue  HENT: Negative for sore throat, trouble swallowing and voice change  Eyes: Negative for visual disturbance  Respiratory: Negative for choking, chest tightness and shortness of breath  Cardiovascular: Negative for chest pain, palpitations and leg swelling  Gastrointestinal: Negative for abdominal pain, constipation and diarrhea  Endocrine: Negative for cold intolerance, heat intolerance, polydipsia, polyphagia and polyuria  Genitourinary: Negative for frequency  Musculoskeletal: Negative for arthralgias and myalgias     Skin: Negative for rash    Neurological: Negative for dizziness and syncope  Hematological: Negative for adenopathy  Psychiatric/Behavioral: Negative for sleep disturbance  All other systems reviewed and are negative  Physical Exam:  Body mass index is 43 58 kg/m²  /88   Pulse 68   Ht 6' 2" (1 88 m)   Wt (!) 154 kg (339 lb 6 4 oz)   BMI 43 58 kg/m²     Wt Readings from Last 3 Encounters:   03/30/18 (!) 154 kg (339 lb 6 4 oz)   02/22/18 (!) 153 kg (337 lb 9 6 oz)   01/11/18 (!) 153 kg (338 lb 3 9 oz)       Physical Exam   Constitutional: He is oriented to person, place, and time  He appears well-developed and well-nourished  No distress  HENT:   Head: Normocephalic and atraumatic  Mouth/Throat: Oropharynx is clear and moist    Eyes: Conjunctivae and EOM are normal  Pupils are equal, round, and reactive to light  Neck: Normal range of motion  Neck supple  No thyromegaly present  Cardiovascular: Normal rate, regular rhythm and normal heart sounds  No murmur heard  Pulmonary/Chest: Effort normal and breath sounds normal  No respiratory distress  He has no wheezes  He has no rales  Abdominal: Soft  Bowel sounds are normal  He exhibits no distension  There is no tenderness  Musculoskeletal: Normal range of motion  He exhibits no edema  Lymphadenopathy:     He has no cervical adenopathy  Neurological: He is alert and oriented to person, place, and time  Skin: Skin is warm and dry  Psychiatric: He has a normal mood and affect  Vitals reviewed        Labs:     Lab Results   Component Value Date     09/24/2017    K 3 8 09/24/2017     09/24/2017    CO2 26 09/24/2017    ANIONGAP 10 09/24/2017    BUN 19 09/24/2017    CREATININE 1 11 09/24/2017    GLUCOSE 219 (H) 09/24/2017    CALCIUM 8 4 09/24/2017    AST 25 09/24/2017    ALT 59 09/24/2017    ALKPHOS 54 09/24/2017    PROT 6 8 09/24/2017    BILITOT 1 10 (H) 09/24/2017    EGFR 79 09/24/2017     Component      Latest Ref Rng & Units 2/21/2018 EXT Hemoglobin A1C       5 8     Total Testosterone: 163  Free Testosterone: 3 7     Impression & Plan:    Problem List Items Addressed This Visit     Diabetes mellitus, type 2 (HCC)     A1C 5 8  He is being managed by his PCP, follow up appointment May 10         Hypertension     BP slightly elevated, continue current regimen         Hypogonadism, male - Primary     His total and free testosterone are low  He is not consistently using androgel due to cost  Will be able to use appropriately when his work schedule picks up in next few weeks  PCP to manage his testosterone and he will follow up with us as needed  He reports his PCP is ordering follow up labs for his testosterone  Relevant Medications    testosterone (ANDROGEL PUMP) 1 62 % TD gel pump    Klinefelter's syndrome          No orders of the defined types were placed in this encounter  Patient Instructions   Follow up as needed  Primary will be managing testosterone         Discussed with the patient and all questioned fully answered  He will call me if any problems arise        Follow-up PRN    Counseled patient on diagnostic results, prognosis, risk and benefit of treatment options, instruction for management, importance of treatment compliance, Risk  factor reduction and impressions      JUAN PABLO Yanez

## 2018-03-30 NOTE — ASSESSMENT & PLAN NOTE
His total and free testosterone are low  He is not consistently using androgel due to cost  Will be able to use appropriately when his work schedule picks up in next few weeks  PCP to manage his testosterone and he will follow up with us as needed  He reports his PCP is ordering follow up labs for his testosterone

## 2018-03-30 NOTE — LETTER
March 30, 2018     Bebo Rosen, DO  [address] [address] [address] [place] [address]    Patient: Parish Evans   YOB: 1970   Date of Visit: 3/30/2018       Dear Dr Sarahi Peralta: Thank you for referring Mary Mcleod to me for evaluation  Below are my notes for this consultation  If you have questions, please do not hesitate to call me  I look forward to following your patient along with you  Sincerely,        JUAN PABLO Freitas        CC: No Recipients  Mylene Freitas   3/30/2018  8:35 AM  Incomplete    Established Patient Progress Note       Chief Complaint   Patient presents with    Hypogonadism      Low testosterone   Treatment     January started androgel   Feels difference in symptoms  hasnt not taken concsitntly due to insurance   Once he starts work consistently will have be able to pay for it   PCP comfotabtable managing testosterone is closer to where lives in [place]     History of Present Illness:     Parish Evans is a 52 y o  male with a history of HTN, type 2 diabetes, and hypogonadism secondary to klinefelter syndrome  For the hypogonadism he was started on androgel 1 62% gel in January  He had labs completed one month after starting his testosterone replacement  His CBC, TSH, and T4 were all within normal range  His total and free testosterone were low at 163 and 3 7 respectively  He reports he has not been using the gel consistently and often skipping days due to the cost of the medication  He reports his work will start picking up again when the weather improves and he will be able to afford his medication and take consistently  He reports he does notice an improvement in his symptoms when he is using the medication consistently  He reports he has spoken with his PCP and they are comfortable managing his testosterone as he lives in [place] and will be unable to come into this office when his work schedule picks up in the next few weeks   He has a follow up appointment scheduled with his PCP May 10th  For there HTN he is taking Losartan and HCTZ  For the diabetes he does not take his medication consistently  Is being managed by his PCP  Patient Active Problem List   Diagnosis    Diabetes mellitus, type 2 ([address] )    Hypertension    Hypogonadism, male    Klinefelter's syndrome    Nephrolithiasis    Obesity    Sleep disturbances    Injury of right wrist    Infected cyst of skin      Past Medical History:   Diagnosis Date    Hypertension       Past Surgical History:   Procedure Laterality Date    KNEE SURGERY      STONE ANALYSIS (HISTORICAL)      TONSILECTOMY AND ADNOIDECTOMY        Family History   Problem Relation Age of Onset    Diabetes Mother      Social History   Substance Use Topics    Smoking status: Never Smoker    Smokeless tobacco: Current User    Alcohol use No     Allergies   Allergen Reactions    Penicillins GI Intolerance       Current Outpatient Prescriptions:     hydrochlorothiazide (HYDRODIURIL) 25 mg tablet, , Disp: , Rfl:     ibuprofen (MOTRIN) 800 mg tablet, Take 1 tablet by mouth every 6 (six) hours as needed (p r n  pain), Disp: 30 tablet, Rfl: 0    losartan (COZAAR) 25 mg tablet, Take 1 tablet by mouth daily, Disp: , Rfl:     testosterone (ANDROGEL PUMP) 1 62 % TD gel pump, Apply 1 actuation (20 25 mg total) topically every morning, Disp: 30 actuation, Rfl: 3    glyBURIDE (DIABETA) 5 mg tablet, Take 1 tablet by mouth daily, Disp: , Rfl:     glyBURIDE-metFORMIN (GLUCOVANCE) 2 5-500 MG per tablet, Take 1 tablet by mouth daily, Disp: , Rfl:     Review of Systems   Constitutional: Negative for activity change, appetite change and fatigue  HENT: Negative for sore throat, trouble swallowing and voice change  Eyes: Negative for visual disturbance  Respiratory: Negative for choking, chest tightness and shortness of breath  Cardiovascular: Negative for chest pain, palpitations and leg swelling     Gastrointestinal: Negative for abdominal pain, constipation and diarrhea  Endocrine: Negative for cold intolerance, heat intolerance, polydipsia, polyphagia and polyuria  Genitourinary: Negative for frequency  Musculoskeletal: Negative for arthralgias and myalgias  Skin: Negative for rash  Neurological: Negative for dizziness and syncope  Hematological: Negative for adenopathy  Psychiatric/Behavioral: Negative for sleep disturbance  All other systems reviewed and are negative  Physical Exam:  Body mass index is 43 58 kg/m²  /88   Pulse 68   Ht 6' 2" (1 88 m)   Wt (!) 154 kg (339 lb 6 4 oz)   BMI 43 58 kg/m²     Wt Readings from Last 3 Encounters:   03/30/18 (!) 154 kg (339 lb 6 4 oz)   02/22/18 (!) 153 kg (337 lb 9 6 oz)   01/11/18 (!) 153 kg (338 lb 3 9 oz)       Physical Exam   Constitutional: He is oriented to person, place, and time  He appears well-developed and well-nourished  No distress  HENT:   Head: Normocephalic and atraumatic  Mouth/Throat: Oropharynx is clear and moist    Eyes: Conjunctivae and EOM are normal  Pupils are equal, round, and reactive to light  Neck: Normal range of motion  Neck supple  No thyromegaly present  Cardiovascular: Normal rate, regular rhythm and normal heart sounds  No murmur heard  Pulmonary/Chest: Effort normal and breath sounds normal  No respiratory distress  He has no wheezes  He has no rales  Abdominal: Soft  Bowel sounds are normal  He exhibits no distension  There is no tenderness  Musculoskeletal: Normal range of motion  He exhibits no edema  Lymphadenopathy:     He has no cervical adenopathy  Neurological: He is alert and oriented to person, place, and time  Skin: Skin is warm and dry  Psychiatric: He has a normal mood and affect  Vitals reviewed        Labs:     Lab Results   Component Value Date     09/24/2017    K 3 8 09/24/2017     09/24/2017    CO2 26 09/24/2017    ANIONGAP 10 09/24/2017    BUN 19 09/24/2017 CREATININE 1 11 09/24/2017    GLUCOSE 219 (H) 09/24/2017    CALCIUM 8 4 09/24/2017    AST 25 09/24/2017    ALT 59 09/24/2017    ALKPHOS 54 09/24/2017    PROT 6 8 09/24/2017    BILITOT 1 10 (H) 09/24/2017    EGFR 79 09/24/2017     Component      Latest Ref Rng & Units 2/21/2018   EXT Hemoglobin A1C       5 8         Impression & Plan:    Problem List Items Addressed This Visit     Diabetes mellitus, type 2 ([facility] )    Hypertension    Hypogonadism, male - Primary    Relevant Medications    testosterone (ANDROGEL PUMP) 1 62 % TD gel pump    Klinefelter's syndrome          No orders of the defined types were placed in this encounter  Patient Instructions   Follow up as needed  Primary will be managing testosterone         Discussed with the patient and all questioned fully answered  He will call me if any problems arise  Follow-up appointment in *** months  Counseled patient on diagnostic results, prognosis, risk and benefit of treatment options, instruction for management, importance of treatment compliance, Risk  factor reduction and impressions      Fco Suarez, [address] [facility], [facility]  3/30/2018  7:46 AM  Sign at close encounter    Established Patient Progress Note       No chief complaint on file       Low testosterone   Treatment     January started androgel   Feels difference in symptoms  hasnt not taken concsitntly due to insurance   Once he starts work consistently will have be able to pay for it   PCP comfotabtable managing testosterone is closer to where lives in [place]     History of Present Illness:     Jeri Louise is a 52 y o  male with a history of          Patient Active Problem List   Diagnosis    Diabetes mellitus, type 2 ([facility] )    Hypertension    Hypogonadism, male    Klinefelter's syndrome    Nephrolithiasis    Obesity    Sleep disturbances    Injury of right wrist    Infected cyst of skin      Past Medical History:   Diagnosis Date    Hypertension       Past Surgical History:   Procedure Laterality Date    KNEE SURGERY      STONE ANALYSIS (HISTORICAL)      TONSILECTOMY AND ADNOIDECTOMY        Family History   Problem Relation Age of Onset    Diabetes Mother      Social History   Substance Use Topics    Smoking status: Never Smoker    Smokeless tobacco: Current User    Alcohol use No     Allergies   Allergen Reactions    Penicillins GI Intolerance       Current Outpatient Prescriptions:     hydrochlorothiazide (HYDRODIURIL) 25 mg tablet, , Disp: , Rfl:     ibuprofen (MOTRIN) 800 mg tablet, Take 1 tablet by mouth every 6 (six) hours as needed (p r n  pain), Disp: 30 tablet, Rfl: 0    losartan (COZAAR) 25 mg tablet, Take 1 tablet by mouth daily, Disp: , Rfl:     testosterone (ANDROGEL PUMP) 1 62 % TD gel pump, Place on the skin, Disp: , Rfl:     glyBURIDE (DIABETA) 5 mg tablet, Take 1 tablet by mouth daily, Disp: , Rfl:     glyBURIDE-metFORMIN (GLUCOVANCE) 2 5-500 MG per tablet, Take 1 tablet by mouth daily, Disp: , Rfl:     Review of Systems    Physical Exam:  Body mass index is 43 58 kg/m²    /88   Pulse 68   Ht 6' 2" (1 88 m)   Wt (!) 154 kg (339 lb 6 4 oz)   BMI 43 58 kg/m²     Wt Readings from Last 3 Encounters:   03/30/18 (!) 154 kg (339 lb 6 4 oz)   02/22/18 (!) 153 kg (337 lb 9 6 oz)   01/11/18 (!) 153 kg (338 lb 3 9 oz)       Physical Exam    Labs:       Lab Results   Component Value Date    CREATININE 1 11 09/24/2017    BUN 19 09/24/2017     09/24/2017    K 3 8 09/24/2017     09/24/2017    CO2 26 09/24/2017     eGFR   Date Value Ref Range Status   09/24/2017 79 ml/min/1 73sq m Final       No results found for: CHOL, HDL, TRIG    Lab Results   Component Value Date    ALT 59 09/24/2017    AST 25 09/24/2017    ALKPHOS 54 09/24/2017    BILITOT 1 10 (H) 09/24/2017       No results found for: FREET4, TSI      Impression & Plan:    Problem List Items Addressed This Visit     Diabetes mellitus, type 2 ([address] )    Hypogonadism, male - Primary          No orders of the defined types were placed in this encounter  There are no Patient Instructions on file for this visit  Discussed with the patient and all questioned fully answered  He will call me if any problems arise  Follow-up appointment in *** months       Counseled patient on diagnostic results, prognosis, risk and benefit of treatment options, instruction for management, importance of treatment compliance, Risk  factor reduction and impressions      Colin Florian [address]

## 2018-03-30 NOTE — PROGRESS NOTES
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 syndrome  For the hypogonadism he was started on androgel 1 62% gel in January  He had labs completed one month after starting his testosterone replacement  His CBC, TSH, and T4 were all within normal range  His total and free testosterone were low at 163 and 3 7 respectively  He reports he has not been using the gel consistently and often skipping days due to the cost of the medication  He reports his work will start picking up again when the weather improves and he will be able to afford his medication and take consistently  He reports he does notice an improvement in his symptoms when he is using the medication consistently  He reports he has spoken with his PCP and they are comfortable managing his testosterone as he lives in [place] and will be unable to come into this office when his work schedule picks up in the next few weeks  He has a follow up appointment scheduled with his PCP May 10th  For there HTN he is taking Losartan and HCTZ  For the diabetes he does not take his medication consistently  Is being managed by his PCP          Patient Active Problem List   Diagnosis    Diabetes mellitus, type [facility]    Hypertension    Hypogonadism, male    Klinefelter's syndrome    Nephrolithiasis    Obesity    Sleep disturbances    Injury of right wrist    Infected cyst of skin      Past Medical History:   Diagnosis Date    Hypertension       Past Surgical History:   Procedure Laterality Date    KNEE SURGERY      STONE ANALYSIS (HISTORICAL)      TONSILECTOMY AND ADNOIDECTOMY        Family History   Problem Relation Age of Onset    Diabetes Mother      Social History   Substance Use Topics    Smoking status: Never Smoker    Smokeless tobacco: Current User    Alcohol use No     Allergies   Allergen Reactions    Penicillins GI Intolerance       Current Outpatient Prescriptions:     hydrochlorothiazide (HYDRODIURIL) 25 mg tablet, , Disp: , Rfl:     ibuprofen (MOTRIN) 800 mg tablet, Take 1 tablet by mouth every 6 (six) hours as needed (p r n  pain), Disp: 30 tablet, Rfl: 0    losartan (COZAAR) 25 mg tablet, Take 1 tablet by mouth daily, Disp: , Rfl:     testosterone (ANDROGEL PUMP) 1 62 % TD gel pump, Apply 1 actuation (20 25 mg total) topically every morning, Disp: 30 actuation, Rfl: 3    glyBURIDE (DIABETA) 5 mg tablet, Take 1 tablet by mouth daily, Disp: , Rfl:     glyBURIDE-metFORMIN (GLUCOVANCE) 2 5-500 MG per tablet, Take 1 tablet by mouth daily, Disp: , Rfl:     Review of Systems   Constitutional: Negative for activity change, appetite change and fatigue  HENT: Negative for sore throat, trouble swallowing and voice change  Eyes: Negative for visual disturbance  Respiratory: Negative for choking, chest tightness and shortness of breath  Cardiovascular: Negative for chest pain, palpitations and leg swelling  Gastrointestinal: Negative for abdominal pain, constipation and diarrhea  Endocrine: Negative for cold intolerance, heat intolerance, polydipsia, polyphagia and polyuria  Genitourinary: Negative for frequency  Musculoskeletal: Negative for arthralgias and myalgias  Skin: Negative for rash  Neurological: Negative for dizziness and syncope  Hematological: Negative for adenopathy  Psychiatric/Behavioral: Negative for sleep disturbance  All other systems reviewed and are negative  Physical Exam:  Body mass index is 43 58 kg/m²    /88   Pulse 68   Ht 6' 2" (1 88 m)   Wt (!) 154 kg (339 lb 6 4 oz)   BMI 43 58 kg/m²    Wt Readings from Last 3 Encounters:   03/30/18 (!) 154 kg (339 lb 6 4 oz)   02/22/18 (!) 153 kg (337 lb 9 6 oz)   01/11/18 (!) 153 kg (338 lb 3 9 oz)       Physical Exam   Constitutional: He is oriented to person, place, and time  He appears well-developed and well-nourished  No distress  HENT:   Head: Normocephalic and atraumatic  Mouth/Throat: Oropharynx is clear and moist    Eyes: Conjunctivae and EOM are normal  Pupils are equal, round, and reactive to light  Neck: Normal range of motion  Neck supple  No thyromegaly present  Cardiovascular: Normal rate, regular rhythm and normal heart sounds  No murmur heard  Pulmonary/Chest: Effort normal and breath sounds normal  No respiratory distress  He has no wheezes  He has no rales  Abdominal: Soft  Bowel sounds are normal  He exhibits no distension  There is no tenderness  Musculoskeletal: Normal range of motion  He exhibits no edema  Lymphadenopathy:     He has no cervical adenopathy  Neurological: He is alert and oriented to person, place, and time  Skin: Skin is warm and dry  Psychiatric: He has a normal mood and affect  Vitals reviewed  Labs:     Lab Results   Component Value Date     09/24/2017    K 3 8 09/24/2017     09/24/2017    CO2 26 09/24/2017    ANIONGAP 10 09/24/2017    BUN 19 09/24/2017    CREATININE 1 11 09/24/2017    GLUCOSE 219 (H) 09/24/2017    CALCIUM 8 4 09/24/2017    AST 25 09/24/2017    ALT 59 09/24/2017    ALKPHOS 54 09/24/2017    PROT 6 8 09/24/2017    BILITOT 1 10 (H) 09/24/2017    EGFR 79 09/24/2017     Component      Latest Ref Rng & Units 2/21/2018   EXT Hemoglobin A1C       5 8     Total Testosterone: 163  Free Testosterone: 3 7     Impression & Plan:    Problem List Items Addressed This Visit     Diabetes mellitus, type 2 (HCC)     A1C 5 8  He is being managed by his PCP, follow up appointment May 10         Hypertension     BP slightly elevated, continue current regimen         Hypogonadism, male - Primary     His total and free testosterone are low  He is not consistently using androgel due to cost  Will be able to use appropriately when his work schedule picks up in next few weeks  PCP to manage his testosterone and he will follow up with us as needed  He reports his PCP is ordering follow up labs for his testosterone  Relevant Medications    testosterone (ANDROGEL PUMP) 1 62 % TD gel pump    Klinefelter's syndrome          No orders of the defined types were placed in this encounter  Patient Instructions   Follow up as needed  Primary will be managing testosterone         Discussed with the patient and all questioned fully answered  He will call me if any problems arise        Follow-up PRN    Counseled patient on diagnostic results, prognosis, risk and benefit of treatment options, instruction for management, importance of treatment compliance, Risk  factor reduction and impressions      JUAN PABLO Lutz

## 2018-03-30 NOTE — LETTER
March 30, 2018     Winter Abdul, DO  [address] [address] [place] [place] [address]    Patient: Kiki Donaldson   YOB: 1970   Date of Visit: 3/30/2018       Dear Dr Rancho Chambers: Thank you for referring Jamie Guo to me for evaluation  Below are my notes for this consultation  If you have questions, please do not hesitate to call me  I look forward to following your patient along with you  Sincerely,        JUAN PABLO Yanez        CC: No Recipients  Mariposa Martin, Mylene Lopez  3/30/2018  8:35 AM  Incomplete    Established Patient Progress Note       Chief Complaint   Patient presents with    Hypogonadism      Low testosterone   Treatment     January started androgel   Feels difference in symptoms  hasnt not taken concsitntly due to insurance   Once he starts work consistently will have be able to pay for it   PCP comfotabtable managing testosterone is closer to where lives in [place]     History of Present Illness:     Kiki Donaldson is a 52 y o  male with a history of HTN, type 2 diabetes, and hypogonadism secondary to klinefelter syndrome  For the hypogonadism he was started on androgel 1 62% gel in January  He had labs completed one month after starting his testosterone replacement  His CBC, TSH, and T4 were all within normal range  His total and free testosterone were low at 163 and 3 7 respectively  He reports he has not been using the gel consistently and often skipping days due to the cost of the medication  He reports his work will start picking up again when the weather improves and he will be able to afford his medication and take consistently  He reports he does notice an improvement in his symptoms when he is using the medication consistently  He reports he has spoken with his PCP and they are comfortable managing his testosterone as he lives in [place] and will be unable to come into this office when his work schedule picks up in the next few weeks   He has a follow up appointment scheduled with his PCP May 10th  For there HTN he is taking Losartan and HCTZ  For the diabetes he does not take his medication consistently  Is being managed by his PCP  Patient Active Problem List   Diagnosis    Diabetes mellitus, type 2 ([address] )    Hypertension    Hypogonadism, male    Klinefelter's syndrome    Nephrolithiasis    Obesity    Sleep disturbances    Injury of right wrist    Infected cyst of skin      Past Medical History:   Diagnosis Date    Hypertension       Past Surgical History:   Procedure Laterality Date    KNEE SURGERY      STONE ANALYSIS (HISTORICAL)      TONSILECTOMY AND ADNOIDECTOMY        Family History   Problem Relation Age of Onset    Diabetes Mother      Social History   Substance Use Topics    Smoking status: Never Smoker    Smokeless tobacco: Current User    Alcohol use No     Allergies   Allergen Reactions    Penicillins GI Intolerance       Current Outpatient Prescriptions:     hydrochlorothiazide (HYDRODIURIL) 25 mg tablet, , Disp: , Rfl:     ibuprofen (MOTRIN) 800 mg tablet, Take 1 tablet by mouth every 6 (six) hours as needed (p r n  pain), Disp: 30 tablet, Rfl: 0    losartan (COZAAR) 25 mg tablet, Take 1 tablet by mouth daily, Disp: , Rfl:     testosterone (ANDROGEL PUMP) 1 62 % TD gel pump, Apply 1 actuation (20 25 mg total) topically every morning, Disp: 30 actuation, Rfl: 3    glyBURIDE (DIABETA) 5 mg tablet, Take 1 tablet by mouth daily, Disp: , Rfl:     glyBURIDE-metFORMIN (GLUCOVANCE) 2 5-500 MG per tablet, Take 1 tablet by mouth daily, Disp: , Rfl:     Review of Systems   Constitutional: Negative for activity change, appetite change and fatigue  HENT: Negative for sore throat, trouble swallowing and voice change  Eyes: Negative for visual disturbance  Respiratory: Negative for choking, chest tightness and shortness of breath  Cardiovascular: Negative for chest pain, palpitations and leg swelling     Gastrointestinal: Negative for abdominal pain, constipation and diarrhea  Endocrine: Negative for cold intolerance, heat intolerance, polydipsia, polyphagia and polyuria  Genitourinary: Negative for frequency  Musculoskeletal: Negative for arthralgias and myalgias  Skin: Negative for rash  Neurological: Negative for dizziness and syncope  Hematological: Negative for adenopathy  Psychiatric/Behavioral: Negative for sleep disturbance  All other systems reviewed and are negative  Physical Exam:  Body mass index is 43 58 kg/m²  /88   Pulse 68   Ht 6' 2" (1 88 m)   Wt (!) 154 kg (339 lb 6 4 oz)   BMI 43 58 kg/m²     Wt Readings from Last 3 Encounters:   03/30/18 (!) 154 kg (339 lb 6 4 oz)   02/22/18 (!) 153 kg (337 lb 9 6 oz)   01/11/18 (!) 153 kg (338 lb 3 9 oz)       Physical Exam   Constitutional: He is oriented to person, place, and time  He appears well-developed and well-nourished  No distress  HENT:   Head: Normocephalic and atraumatic  Mouth/Throat: Oropharynx is clear and moist    Eyes: Conjunctivae and EOM are normal  Pupils are equal, round, and reactive to light  Neck: Normal range of motion  Neck supple  No thyromegaly present  Cardiovascular: Normal rate, regular rhythm and normal heart sounds  No murmur heard  Pulmonary/Chest: Effort normal and breath sounds normal  No respiratory distress  He has no wheezes  He has no rales  Abdominal: Soft  Bowel sounds are normal  He exhibits no distension  There is no tenderness  Musculoskeletal: Normal range of motion  He exhibits no edema  Lymphadenopathy:     He has no cervical adenopathy  Neurological: He is alert and oriented to person, place, and time  Skin: Skin is warm and dry  Psychiatric: He has a normal mood and affect  Vitals reviewed        Labs:     Lab Results   Component Value Date     09/24/2017    K 3 8 09/24/2017     09/24/2017    CO2 26 09/24/2017    ANIONGAP 10 09/24/2017    BUN 19 09/24/2017 CREATININE 1 11 09/24/2017    GLUCOSE 219 (H) 09/24/2017    CALCIUM 8 4 09/24/2017    AST 25 09/24/2017    ALT 59 09/24/2017    ALKPHOS 54 09/24/2017    PROT 6 8 09/24/2017    BILITOT 1 10 (H) 09/24/2017    EGFR 79 09/24/2017     Component      Latest Ref Rng & Units 2/21/2018   EXT Hemoglobin A1C       5 8         Impression & Plan:    Problem List Items Addressed This Visit     Diabetes mellitus, type 2 ([facility] )    Hypertension    Hypogonadism, male - Primary    Relevant Medications    testosterone (ANDROGEL PUMP) 1 62 % TD gel pump    Klinefelter's syndrome          No orders of the defined types were placed in this encounter  Patient Instructions   Follow up as needed  Primary will be managing testosterone         Discussed with the patient and all questioned fully answered  He will call me if any problems arise  Follow-up appointment in *** months  Counseled patient on diagnostic results, prognosis, risk and benefit of treatment options, instruction for management, importance of treatment compliance, Risk  factor reduction and impressions      Solomon Mckeon, [address] [facility],First Floor, [facility]  3/30/2018  7:46 AM  Sign at close encounter    Established Patient Progress Note       No chief complaint on file       Low testosterone   Treatment     January started androgel   Feels difference in symptoms  hasnt not taken concsitntly due to insurance   Once he starts work consistently will have be able to pay for it   PCP comfotabtable managing testosterone is closer to where lives in [place]     History of Present Illness:     Florida Mack is a 52 y o  male with a history of          Patient Active Problem List   Diagnosis    Diabetes mellitus, type 2 ([facility] )    Hypertension    Hypogonadism, male    Klinefelter's syndrome    Nephrolithiasis    Obesity    Sleep disturbances    Injury of right wrist    Infected cyst of skin      Past Medical History:   Diagnosis Date    Hypertension       Past Surgical History:   Procedure Laterality Date    KNEE SURGERY      STONE ANALYSIS (HISTORICAL)      TONSILECTOMY AND ADNOIDECTOMY        Family History   Problem Relation Age of Onset    Diabetes Mother      Social History   Substance Use Topics    Smoking status: Never Smoker    Smokeless tobacco: Current User    Alcohol use No     Allergies   Allergen Reactions    Penicillins GI Intolerance       Current Outpatient Prescriptions:     hydrochlorothiazide (HYDRODIURIL) 25 mg tablet, , Disp: , Rfl:     ibuprofen (MOTRIN) 800 mg tablet, Take 1 tablet by mouth every 6 (six) hours as needed (p r n  pain), Disp: 30 tablet, Rfl: 0    losartan (COZAAR) 25 mg tablet, Take 1 tablet by mouth daily, Disp: , Rfl:     testosterone (ANDROGEL PUMP) 1 62 % TD gel pump, Place on the skin, Disp: , Rfl:     glyBURIDE (DIABETA) 5 mg tablet, Take 1 tablet by mouth daily, Disp: , Rfl:     glyBURIDE-metFORMIN (GLUCOVANCE) 2 5-500 MG per tablet, Take 1 tablet by mouth daily, Disp: , Rfl:     Review of Systems    Physical Exam:  Body mass index is 43 58 kg/m²    /88   Pulse 68   Ht 6' 2" (1 88 m)   Wt (!) 154 kg (339 lb 6 4 oz)   BMI 43 58 kg/m²     Wt Readings from Last 3 Encounters:   03/30/18 (!) 154 kg (339 lb 6 4 oz)   02/22/18 (!) 153 kg (337 lb 9 6 oz)   01/11/18 (!) 153 kg (338 lb 3 9 oz)       Physical Exam    Labs:       Lab Results   Component Value Date    CREATININE 1 11 09/24/2017    BUN 19 09/24/2017     09/24/2017    K 3 8 09/24/2017     09/24/2017    CO2 26 09/24/2017     eGFR   Date Value Ref Range Status   09/24/2017 79 ml/min/1 73sq m Final       No results found for: CHOL, HDL, TRIG    Lab Results   Component Value Date    ALT 59 09/24/2017    AST 25 09/24/2017    ALKPHOS 54 09/24/2017    BILITOT 1 10 (H) 09/24/2017       No results found for: FREET4, TSI      Impression & Plan:    Problem List Items Addressed This Visit     Diabetes mellitus, type 2 ([address] )    Hypogonadism, male - Primary          No orders of the defined types were placed in this encounter  There are no Patient Instructions on file for this visit  Discussed with the patient and all questioned fully answered  He will call me if any problems arise  Follow-up appointment in *** months       Counseled patient on diagnostic results, prognosis, risk and benefit of treatment options, instruction for management, importance of treatment compliance, Risk  factor reduction and impressions      Colin Florian [address]

## 2018-04-10 DIAGNOSIS — I10 ESSENTIAL HYPERTENSION: Primary | ICD-10-CM

## 2018-04-10 RX ORDER — LOSARTAN POTASSIUM 25 MG/1
TABLET ORAL
Qty: 30 TABLET | Refills: 5 | Status: SHIPPED | OUTPATIENT
Start: 2018-04-10 | End: 2019-01-30 | Stop reason: SDUPTHER

## 2018-04-25 ENCOUNTER — OFFICE VISIT (OUTPATIENT)
Dept: INTERNAL MEDICINE CLINIC | Facility: CLINIC | Age: 48
End: 2018-04-25
Payer: COMMERCIAL

## 2018-04-25 ENCOUNTER — TELEPHONE (OUTPATIENT)
Dept: INTERNAL MEDICINE CLINIC | Facility: CLINIC | Age: 48
End: 2018-04-25

## 2018-04-25 VITALS
BODY MASS INDEX: 40.43 KG/M2 | HEART RATE: 60 BPM | WEIGHT: 315 LBS | TEMPERATURE: 97 F | HEIGHT: 74 IN | OXYGEN SATURATION: 97 % | DIASTOLIC BLOOD PRESSURE: 78 MMHG | SYSTOLIC BLOOD PRESSURE: 134 MMHG

## 2018-04-25 DIAGNOSIS — L08.9 INFECTED CYST OF SKIN: Primary | ICD-10-CM

## 2018-04-25 DIAGNOSIS — L72.9 INFECTED CYST OF SKIN: Primary | ICD-10-CM

## 2018-04-25 PROCEDURE — 3008F BODY MASS INDEX DOCD: CPT | Performed by: INTERNAL MEDICINE

## 2018-04-25 PROCEDURE — 99213 OFFICE O/P EST LOW 20 MIN: CPT | Performed by: INTERNAL MEDICINE

## 2018-04-25 RX ORDER — CLINDAMYCIN HYDROCHLORIDE 300 MG/1
300 CAPSULE ORAL 3 TIMES DAILY
Qty: 30 CAPSULE | Refills: 0 | Status: SHIPPED | OUTPATIENT
Start: 2018-04-25 | End: 2018-05-05

## 2018-04-25 RX ORDER — CLINDAMYCIN HYDROCHLORIDE 300 MG/1
CAPSULE ORAL
COMMUNITY
Start: 2018-04-17 | End: 2018-04-25 | Stop reason: SDUPTHER

## 2018-04-25 NOTE — PROGRESS NOTES
Assessment/Plan:      Infected cyst calf left - start cleocin which he tolerated in past and told patient to call derm to let them know as hopefully they will setup followup as it may need I and D again  He cannot have it surgically removed until infection is cleared    Patient aware last a1c was within range but he has a past elevated a1c and fasting blood sugar so he is at risk for difficulty healing due to that  Elevate lower leg at rest,warm compresses  Hopefully he can get infection cleared and date of surgery sooner - he told me he can coordiinate with work as long as he has some notice     Patient ID: Annia Lewis is a 52 y o  male  HPI   Patient has infected cyst left calf and saw Dr Marty Chi of [facility] who did I and D and scheduled removal in July primarily because of patient work schedule  Today he called quite upset at us since they told him he could call for surgery sooner and now cannot do it  No fever/chills  Area is painful but no drainage  It is getting bigger  Was told cannot have surgery until infection completely cleared  He said it had been doing better but over past couple days resurfaced  Vitals:    04/25/18 1002   BP: 134/78   Pulse:    Temp:    SpO2:      Past Medical History:   Diagnosis Date    Hypertension      Past Surgical History:   Procedure Laterality Date    KNEE SURGERY      STONE ANALYSIS (HISTORICAL)      TONSILECTOMY AND ADNOIDECTOMY       Social History     Social History    Marital status: /Civil Union     Spouse name: N/A    Number of children: N/A    Years of education: N/A     Occupational History    Not on file       Social History Main Topics    Smoking status: Never Smoker    Smokeless tobacco: Current User    Alcohol use No    Drug use: No    Sexual activity: Not on file     Other Topics Concern    Not on file     Social History Narrative    No narrative on file     Allergies   Allergen Reactions    Penicillins GI Intolerance Review of Systems   Constitutional: Negative for chills and fever  HENT: Negative  Eyes: Negative  Respiratory: Negative  Cardiovascular: Negative for leg swelling  Gastrointestinal: Negative  Endocrine: Negative for cold intolerance and heat intolerance  Genitourinary: Negative  Musculoskeletal: Positive for joint swelling  Skin: Positive for color change and wound  Allergic/Immunologic: Negative  Neurological: Negative  Hematological: Negative for adenopathy  Psychiatric/Behavioral: Negative  Physical Exam   Constitutional: He is oriented to person, place, and time  He appears well-developed and well-nourished  No distress  HENT:   Head: Normocephalic and atraumatic  Right Ear: External ear normal    Left Ear: External ear normal    Nose: Nose normal    Mouth/Throat: Oropharynx is clear and moist  No oropharyngeal exudate  Eyes: Conjunctivae and EOM are normal  Pupils are equal, round, and reactive to light  No scleral icterus  Neck: Normal range of motion  Neck supple  No JVD present  Cardiovascular: Normal rate, regular rhythm, normal heart sounds and intact distal pulses  Pulmonary/Chest: Effort normal and breath sounds normal  No respiratory distress  He has no wheezes  He has no rales  He exhibits no tenderness  Abdominal: Soft  Bowel sounds are normal  He exhibits no distension and no mass  There is no tenderness  There is no rebound and no guarding  Musculoskeletal: He exhibits tenderness and deformity  He exhibits no edema  Lymphadenopathy:     He has no cervical adenopathy  Neurological: He is alert and oriented to person, place, and time  He has normal reflexes  He displays normal reflexes  No cranial nerve deficit  He exhibits abnormal muscle tone  Coordination normal    Skin: Skin is warm and dry  He is not diaphoretic  There is erythema     Raised warm fluctuant cyst back of left calf   Psychiatric: He has a normal mood and affect  His behavior is normal  Judgment and thought content normal    Nursing note and vitals reviewed

## 2018-04-25 NOTE — TELEPHONE ENCOUNTER
Check if any consults in system - I dont recall seeing  If worse today and he cannot call them would go to urgent care or ER - may need wound care follow up

## 2018-05-10 ENCOUNTER — OFFICE VISIT (OUTPATIENT)
Dept: INTERNAL MEDICINE CLINIC | Facility: CLINIC | Age: 48
End: 2018-05-10
Payer: COMMERCIAL

## 2018-05-10 VITALS
TEMPERATURE: 97.6 F | HEIGHT: 74 IN | WEIGHT: 315 LBS | OXYGEN SATURATION: 97 % | HEART RATE: 76 BPM | SYSTOLIC BLOOD PRESSURE: 134 MMHG | BODY MASS INDEX: 40.43 KG/M2 | DIASTOLIC BLOOD PRESSURE: 80 MMHG

## 2018-05-10 DIAGNOSIS — L08.9 INFECTED CYST OF SKIN: Primary | ICD-10-CM

## 2018-05-10 DIAGNOSIS — E11.9 TYPE 2 DIABETES MELLITUS WITHOUT COMPLICATION, WITHOUT LONG-TERM CURRENT USE OF INSULIN (HCC): ICD-10-CM

## 2018-05-10 DIAGNOSIS — L72.9 INFECTED CYST OF SKIN: Primary | ICD-10-CM

## 2018-05-10 DIAGNOSIS — E66.01 CLASS 3 SEVERE OBESITY DUE TO EXCESS CALORIES WITHOUT SERIOUS COMORBIDITY WITH BODY MASS INDEX (BMI) OF 40.0 TO 44.9 IN ADULT (HCC): ICD-10-CM

## 2018-05-10 PROCEDURE — 99214 OFFICE O/P EST MOD 30 MIN: CPT | Performed by: INTERNAL MEDICINE

## 2018-05-10 RX ORDER — BETAMETHASONE DIPROPIONATE 0.5 MG/G
CREAM TOPICAL
COMMUNITY
Start: 2018-05-01 | End: 2019-01-30 | Stop reason: ALTCHOICE

## 2018-05-10 NOTE — PROGRESS NOTES
Assessment/Plan:      Diagnoses and all orders for this visit:    Infected cyst of skin  Pt having cyst removed early June if no infection    Type 2 diabetes mellitus without complication, without long-term current use of insulin ([facility] )  Recheck for next visit    Class 3 severe obesity due to excess calories without serious comorbidity with body mass index (BMI) of 40 0 to 44 9 in adult ([address] )  Weight loss/diet modification discussed increase water intake, healthy snacks    Other orders  -     betamethasone, augmented, (DIPROLENE-AF) 0 05 % cream;   Cream has helped thus far keep cyst at minimum swelling   RTO 3 months   Patient ID: Cory Stallings is a 52 y o  male  HPI   Pt removal of cyst was moved to June and he is using a steroid cream which has helped limit pain and redness No drainage  He will be off for about 4 days postop  No fever or chills  Less pain  He has days were he gets really drained but admits not using t gel daily    Review of Systems   Constitutional: Negative  HENT: Negative  Eyes: Negative  Respiratory: Negative  Cardiovascular: Negative  Gastrointestinal: Negative  Endocrine: Negative  Genitourinary: Negative  Musculoskeletal: Positive for arthralgias and myalgias  Skin: Positive for wound  Allergic/Immunologic: Negative  Neurological: Negative  Hematological: Negative  Psychiatric/Behavioral: Negative  Vitals:    05/10/18 1643 05/10/18 1652   BP:  134/80   Pulse: 76    Temp: 97 6 °F (36 4 °C)    TempSrc: Tympanic    SpO2: 97%    Weight: (!) 150 kg (331 lb 9 6 oz)    Height: 6' 2" (1 88 m)       Physical Exam   Constitutional: He is oriented to person, place, and time  He appears well-developed and well-nourished  No distress  HENT:   Head: Normocephalic and atraumatic  Right Ear: External ear normal    Left Ear: External ear normal    Nose: Nose normal    Mouth/Throat: Oropharynx is clear and moist  No oropharyngeal exudate     Eyes: Conjunctivae and EOM are normal  Pupils are equal, round, and reactive to light  No scleral icterus  Neck: Normal range of motion  Neck supple  No JVD present  Cardiovascular: Normal rate, regular rhythm, normal heart sounds and intact distal pulses  Pulmonary/Chest: Effort normal and breath sounds normal    Abdominal: Soft  Bowel sounds are normal  He exhibits no distension and no mass  There is no tenderness  There is no rebound and no guarding  Musculoskeletal: Normal range of motion  He exhibits deformity  He exhibits no tenderness  Lymphadenopathy:     He has no cervical adenopathy  Neurological: He is alert and oriented to person, place, and time  He has normal reflexes  He displays normal reflexes  No cranial nerve deficit  He exhibits normal muscle tone  Coordination normal    Skin: Skin is warm and dry  He is not diaphoretic  Open area left calf mild erythema   Psychiatric: He has a normal mood and affect  His behavior is normal  Judgment and thought content normal    Nursing note and vitals reviewed

## 2018-06-28 ENCOUNTER — EVALUATION (OUTPATIENT)
Dept: PHYSICAL THERAPY | Facility: CLINIC | Age: 48
End: 2018-06-28
Payer: COMMERCIAL

## 2018-06-28 DIAGNOSIS — M79.662 PAIN OF LEFT CALF: Primary | ICD-10-CM

## 2018-06-28 PROCEDURE — 97110 THERAPEUTIC EXERCISES: CPT | Performed by: PHYSICAL THERAPIST

## 2018-06-28 PROCEDURE — G8979 MOBILITY GOAL STATUS: HCPCS | Performed by: PHYSICAL THERAPIST

## 2018-06-28 PROCEDURE — 97162 PT EVAL MOD COMPLEX 30 MIN: CPT | Performed by: PHYSICAL THERAPIST

## 2018-06-28 PROCEDURE — G8978 MOBILITY CURRENT STATUS: HCPCS | Performed by: PHYSICAL THERAPIST

## 2018-06-28 PROCEDURE — 97140 MANUAL THERAPY 1/> REGIONS: CPT | Performed by: PHYSICAL THERAPIST

## 2018-06-28 NOTE — LETTER
2018    Michoacano Ariza DO  [address]  [address]  [address]    Patient: Lili Stallings   YOB: 1970   Date of Visit: 2018     Encounter Diagnosis     ICD-10-CM    1  Pain of left calf M79 662        Dear Dr Vishnu Colindres:    Please review the attached Plan of Care from [facility] recent visit  Please verify that you agree therapy should continue by signing the attached document and sending it back to our office  If you have any questions or concerns, please don't hesitate to call  Sincerely,    Ana Rosa Regan, PT      Referring Provider:      I certify that I have read the below Plan of Care and certify the need for these services furnished under this plan of treatment while under my care  Michoacano Ariza DO  [address]  [address]  [address]: [phone]          PT Evaluation     Today's date: 2018  Patient name: Lili Stallings  : 1970  MRN: [id number]  Referring provider: Joy Macario DO  Dx:   Encounter Diagnosis     ICD-10-CM    1  Pain of left calf M79 662                   Assessment    Assessment details:   CURRENT FUNCTIONAL STATUS    Sleep tolerance 3 hours  Standing/ADL tolerance 2 hours  Walking tolerance 300 feet  Ascends stairs step by step/descends stairs step by step  Difficulty arising from sitting: Moderate  Unable to squat  SHORT TERM GOALS (2 WEEKS)    Increase ankle AROM 10-15 degrees  Increase ankle strength 3-5 lbs in all weak areas  Girth around mid calf: 47 5 cm  Decrease pain to 0-5/10  Sleep tolerance 6 hours  Standing/ADL tolerance 3 hours  Walking tolerance 1/4 mile  Ascends stairs reciprocally/descends stairs step by step  Difficulty arising from sitting: Minimal  Able to squat 30 degrees  LONG TERM GOALS (DISCHARGE)    Ankle AROM: DF=5 deg, PF=40 deg, IV=25 deg, EV=10 deg    Ankle Strength: DF=47 lbs, PF=61 lbs, IV=21 lbs, EV=24 lbs   Girth around midcalf: 47 cm  Decrease pain to 0-1/10  Sleep tolerance 8 hours  Standing/ADL tolerance 8 hours  Walking tolerance 1 mile  Ascends/descends stairs reciprocally  Difficulty arising from sitting: None  Able to squat 70 degrees  Understanding of Dx/Px/POC: good   Prognosis: good  Prognosis details: See assessment details above  Plan  Planned modality interventions: cryotherapy  Planned therapy interventions: manual therapy, therapeutic exercise, therapeutic activities and gait training  Frequency: 2x week  Duration in weeks: 4  Plan details: Emmanuelle Jim is a 50y o  year old male presenting to PT with pain, decreased range of motion, decreased strength, and decreased tolerance to activity  This patient would benefit from skilled PT services to address these issues and to maximize function  A home exercise program was provided and all questions were answered  Thank you for the referral           Subjective Evaluation    History of Present Illness  Mechanism of injury: CC: Severe left calf pain, tightness, swelling, and tenderness, difficulty walking  HPI: The patient has had a mass in his left calf muscle for 17 years  He forcefully bumped it last year, and it began to enlarge  He had it removed on 2018  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  Five days after the surgery, he had a severe cramp that tore some of the stitches  The cramp occurred again 2 days later  He still gets some drainage from the wound, and he is using a compression garment  He saw his surgeon yesterday, and he was instructed to begin PT  He is returning to work as  on   He is unable to ride his motorcycle at this time    Pain  Current pain ratin  At best pain ratin  At worst pain rating: 10    Patient Goals  Patient goals for therapy: decreased edema, decreased pain, increased motion, increased strength and return to sport/leisure activities          Objective     Observations Additional Observation Details  Gait is severely antalgic without an AD  The patient externally rotates his left leg due to limited left ankle motion  A very small open area is noted in the mid calf, with slight bloody discharge  Redness around it measures 6 cm long, and 3 cm wide    General Comments     Ankle/Foot Comments   CURRENT OBJECTIVE MEASUREMENTS    Ankle AROM: DF=-30 deg, PF=35 deg, IV=25 deg, EV=10 deg  Ankle Strength: DF=18 lbs, PF=8 lbs, IV=9 lbs, EV=13 lbs     Girth around mid calf: 48 cm               Precautions: None    Daily Treatment Diary     Manual 6/28        Ankle PROM         Calf Stretch RK        Calf MLD RK        Exercise Diary          Ankle  Pumps, Circles, IV/EV         Ankle T-Band         Calf Stretch with Strap, knee flexed and extended 30" x 5 reps TID        Pro Stretch         Steps Forward         Steps Lateral         Mini Squats         BAPS Board         Fitter Board         SLS         Toe Press: S         Bike: S         NuStep: S , A         Treadmill                  Modalities          CP                                  Flowsheet Rows      Most Recent Value   PT/OT G-Codes   Current Score  31   Projected Score  63   FOTO information reviewed  Yes   Assessment Type  Evaluation   G code set  Mobility: Walking & Moving Around   Mobility: Walking and Moving Around Current Status ()  CL   Mobility: Walking and Moving Around Goal Status ()  CJ

## 2018-06-28 NOTE — PROGRESS NOTES
PT Evaluation     Today's date: 2018  Patient name: William Alfaro  : 1970  MRN: [id number]  Referring provider: Bebo Fried DO  Dx:   Encounter Diagnosis     ICD-10-CM    1  Pain of left calf M79 662                   Assessment    Assessment details:   CURRENT FUNCTIONAL STATUS    Sleep tolerance 3 hours  Standing/ADL tolerance 2 hours  Walking tolerance 300 feet  Ascends stairs step by step/descends stairs step by step  Difficulty arising from sitting: Moderate  Unable to squat  SHORT TERM GOALS (2 WEEKS)    Increase ankle AROM 10-15 degrees  Increase ankle strength 3-5 lbs in all weak areas  Girth around mid calf: 47 5 cm  Decrease pain to 0-5/10  Sleep tolerance 6 hours  Standing/ADL tolerance 3 hours  Walking tolerance 1/4 mile  Ascends stairs reciprocally/descends stairs step by step  Difficulty arising from sitting: Minimal  Able to squat 30 degrees  LONG TERM GOALS (DISCHARGE)    Ankle AROM: DF=5 deg, PF=40 deg, IV=25 deg, EV=10 deg  Ankle Strength: DF=47 lbs, PF=61 lbs, IV=21 lbs, EV=24 lbs  Girth around midcalf: 47 cm  Decrease pain to 0-1/10  Sleep tolerance 8 hours  Standing/ADL tolerance 8 hours  Walking tolerance 1 mile  Ascends/descends stairs reciprocally  Difficulty arising from sitting: None  Able to squat 70 degrees  Understanding of Dx/Px/POC: good   Prognosis: good  Prognosis details: See assessment details above  Plan  Planned modality interventions: cryotherapy  Planned therapy interventions: manual therapy, therapeutic exercise, therapeutic activities and gait training  Frequency: 2x week  Duration in weeks: 4  Plan details: William Alfaro is a 50y o  year old male presenting to PT with pain, decreased range of motion, decreased strength, and decreased tolerance to activity  This patient would benefit from skilled PT services to address these issues and to maximize function   A home exercise program was provided and all questions were answered  Thank you for the referral           Subjective Evaluation    History of Present Illness  Mechanism of injury: CC: Severe left calf pain, tightness, swelling, and tenderness, difficulty walking  HPI: The patient has had a mass in his left calf muscle for 17 years  He forcefully bumped it last year, and it began to enlarge  He had it removed on 2018  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  Five days after the surgery, he had a severe cramp that tore some of the stitches  The cramp occurred again 2 days later  He still gets some drainage from the wound, and he is using a compression garment  He saw his surgeon yesterday, and he was instructed to begin PT  He is returning to work as  on 3/98/9489  He is unable to ride his motorcycle at this time  Pain  Current pain ratin  At best pain ratin  At worst pain rating: 10    Patient Goals  Patient goals for therapy: decreased edema, decreased pain, increased motion, increased strength and return to sport/leisure activities          Objective     Observations     Additional Observation Details  Gait is severely antalgic without an AD  The patient externally rotates his left leg due to limited left ankle motion  A very small open area is noted in the mid calf, with slight bloody discharge  Redness around it measures 6 cm long, and 3 cm wide    General Comments     Ankle/Foot Comments   CURRENT OBJECTIVE MEASUREMENTS    Ankle AROM: DF=-30 deg, PF=35 deg, IV=25 deg, EV=10 deg  Ankle Strength: DF=18 lbs, PF=8 lbs, IV=9 lbs, EV=13 lbs     Girth around mid calf: 48 cm               Precautions: None    Daily Treatment Diary     Manual         Ankle PROM         Calf Stretch RK        Calf MLD RK        Exercise Diary          Ankle  Pumps, Circles, IV/EV         Ankle T-Band         Calf Stretch with Strap, knee flexed and extended 30" x 5 reps TID        Pro Stretch         Steps Forward         Steps Lateral         Mini Squats BAPS Board         Fitter Board         SLS         Toe Press: S         Bike: S         NuStep: S , A         Treadmill                  Modalities          CP                                  Flowsheet Rows      Most Recent Value   PT/OT G-Codes   Current Score  31   Projected Score  63   FOTO information reviewed  Yes   Assessment Type  Evaluation   G code set  Mobility: Walking & Moving Around   Mobility: Walking and Moving Around Current Status ()  CL   Mobility: Walking and Moving Around Goal Status ()  CJ

## 2018-06-29 ENCOUNTER — TRANSCRIBE ORDERS (OUTPATIENT)
Dept: PHYSICAL THERAPY | Facility: CLINIC | Age: 48
End: 2018-06-29

## 2018-06-29 DIAGNOSIS — M79.662 PAIN OF LEFT CALF: Primary | ICD-10-CM

## 2018-07-02 ENCOUNTER — APPOINTMENT (OUTPATIENT)
Dept: PHYSICAL THERAPY | Facility: CLINIC | Age: 48
End: 2018-07-02
Payer: COMMERCIAL

## 2018-07-03 ENCOUNTER — OFFICE VISIT (OUTPATIENT)
Dept: PHYSICAL THERAPY | Facility: CLINIC | Age: 48
End: 2018-07-03
Payer: COMMERCIAL

## 2018-07-03 DIAGNOSIS — M79.662 PAIN OF LEFT CALF: Primary | ICD-10-CM

## 2018-07-03 PROCEDURE — 97110 THERAPEUTIC EXERCISES: CPT

## 2018-07-03 PROCEDURE — 97140 MANUAL THERAPY 1/> REGIONS: CPT

## 2018-07-03 NOTE — PROGRESS NOTES
Daily Note     Today's date: 7/3/2018  Patient name: Victor Manuel Pop  : 1970  MRN: [id number]  Referring provider: Jessica Soler DO  Dx:   Encounter Diagnosis     ICD-10-CM    1  Pain of left calf M79 662                   Subjective: Patient reports he was able to descend stairs this morning more easily  He complains of less stiffness/pain in the morning than after his work day  Objective: See treatment diary below      Assessment: Tolerated treatment well  Patient had much less edema in the leg today  The ankle ROM increased as he progressed through the session with the stretching  Plan: Continue per plan of care     Precautions: None     Daily Treatment Diary      Manual 6/28  7/3           Ankle PROM               Calf Stretch RK  LF           Calf MLD RK  LF           Exercise Diary                Ankle  Pumps, Circles, IV/EV    10X           Ankle T-Band               Calf Stretch with Strap, knee flexed and extended 30" x 5 reps TID             Pro Stretch               Steps Forward               Steps Lateral               Mini Squats               BAPS Board               Fitter Board    L 1 10X           SLS               Toe Press: S               Bike: S               NuStep: S , A               Treadmill                               Modalities                CP

## 2018-07-05 ENCOUNTER — OFFICE VISIT (OUTPATIENT)
Dept: PHYSICAL THERAPY | Facility: CLINIC | Age: 48
End: 2018-07-05
Payer: COMMERCIAL

## 2018-07-05 DIAGNOSIS — M79.662 PAIN OF LEFT CALF: Primary | ICD-10-CM

## 2018-07-05 PROCEDURE — 97110 THERAPEUTIC EXERCISES: CPT

## 2018-07-05 PROCEDURE — 97140 MANUAL THERAPY 1/> REGIONS: CPT

## 2018-07-05 NOTE — PROGRESS NOTES
Daily Note     Today's date: 2018  Patient name: Bettina Spaulding  : 1970  MRN: [id number]  Referring provider: Erlin Murillo DO  Dx:   Encounter Diagnosis     ICD-10-CM    1  Pain of left calf M79 662                   Subjective: Patient reports his calf becomes increasingly more sore as his work day goes on  Swelling is increased today as well  Objective: See treatment diary below      Assessment: Tolerated treatment well  Patient tolerated TBand resistance for DF/PF today  He complains of feeling of calf cramping at times  Plan: Continue per plan of care          Precautions: None     Daily Treatment Diary      Manual 6/28  7/3  7/5         Ankle PROM               Calf Stretch RK  LF  LF         Calf MLD RK  LF  LF         Exercise Diary                Ankle  Pumps, Circles, IV/EV    10X  10X         Ankle T-Band      L1 2/10         Calf Stretch with Strap, knee flexed and extended 30" x 5 reps TID             Pro Stretch               Steps Forward               Steps Lateral               Mini Squats               BAPS Board               Fitter Board    L 1 10X  L 1 10X         SLS               Toe Press: S               Bike: S               NuStep: S , A               Treadmill                               Modalities                CP

## 2018-07-09 ENCOUNTER — APPOINTMENT (OUTPATIENT)
Dept: PHYSICAL THERAPY | Facility: CLINIC | Age: 48
End: 2018-07-09
Payer: COMMERCIAL

## 2018-07-10 ENCOUNTER — APPOINTMENT (OUTPATIENT)
Dept: PHYSICAL THERAPY | Facility: CLINIC | Age: 48
End: 2018-07-10
Payer: COMMERCIAL

## 2018-07-11 ENCOUNTER — APPOINTMENT (OUTPATIENT)
Dept: PHYSICAL THERAPY | Facility: CLINIC | Age: 48
End: 2018-07-11
Payer: COMMERCIAL

## 2018-07-30 NOTE — PROGRESS NOTES
Daily Note     Today's date: 2018  Patient name: Janeen Aceves  : 1970  MRN: [id number]  Referring provider: Hipolito Knutson DO  Dx:   Encounter Diagnosis     ICD-10-CM    1  Pain of left calf M79 662        Subjective: The patient states that he is unable to resume therapy due to his work schedule  Objective: Swelling and tendernesss were improved after 3 treatments  Assessment: No updated findings are available for this report due to self discharge        Plan: Discharge from PT

## 2018-09-08 LAB
LEFT EYE DIABETIC RETINOPATHY: NORMAL
RIGHT EYE DIABETIC RETINOPATHY: NORMAL

## 2019-01-30 ENCOUNTER — OFFICE VISIT (OUTPATIENT)
Dept: INTERNAL MEDICINE CLINIC | Facility: CLINIC | Age: 49
End: 2019-01-30
Payer: COMMERCIAL

## 2019-01-30 VITALS
BODY MASS INDEX: 40.43 KG/M2 | TEMPERATURE: 97.6 F | DIASTOLIC BLOOD PRESSURE: 78 MMHG | SYSTOLIC BLOOD PRESSURE: 138 MMHG | OXYGEN SATURATION: 97 % | WEIGHT: 315 LBS | HEART RATE: 77 BPM | HEIGHT: 74 IN

## 2019-01-30 DIAGNOSIS — J40 BRONCHITIS: Primary | ICD-10-CM

## 2019-01-30 DIAGNOSIS — E11.9 TYPE 2 DIABETES MELLITUS WITHOUT COMPLICATION, WITHOUT LONG-TERM CURRENT USE OF INSULIN (HCC): Primary | ICD-10-CM

## 2019-01-30 DIAGNOSIS — E78.5 HYPERLIPIDEMIA, UNSPECIFIED HYPERLIPIDEMIA TYPE: ICD-10-CM

## 2019-01-30 DIAGNOSIS — I10 ESSENTIAL HYPERTENSION: ICD-10-CM

## 2019-01-30 PROCEDURE — 4010F ACE/ARB THERAPY RXD/TAKEN: CPT | Performed by: INTERNAL MEDICINE

## 2019-01-30 PROCEDURE — 3008F BODY MASS INDEX DOCD: CPT | Performed by: INTERNAL MEDICINE

## 2019-01-30 PROCEDURE — 3078F DIAST BP <80 MM HG: CPT | Performed by: INTERNAL MEDICINE

## 2019-01-30 PROCEDURE — 99213 OFFICE O/P EST LOW 20 MIN: CPT | Performed by: INTERNAL MEDICINE

## 2019-01-30 PROCEDURE — 1036F TOBACCO NON-USER: CPT | Performed by: INTERNAL MEDICINE

## 2019-01-30 PROCEDURE — 3075F SYST BP GE 130 - 139MM HG: CPT | Performed by: INTERNAL MEDICINE

## 2019-01-30 RX ORDER — BENZONATATE 100 MG/1
100 CAPSULE ORAL 3 TIMES DAILY PRN
Qty: 20 CAPSULE | Refills: 0 | Status: SHIPPED | OUTPATIENT
Start: 2019-01-30 | End: 2019-02-06

## 2019-01-30 RX ORDER — LOSARTAN POTASSIUM 25 MG/1
25 TABLET ORAL DAILY
Qty: 30 TABLET | Refills: 5 | Status: SHIPPED | OUTPATIENT
Start: 2019-01-30 | End: 2019-08-16 | Stop reason: SDUPTHER

## 2019-01-30 RX ORDER — AZITHROMYCIN 250 MG/1
500 TABLET, FILM COATED ORAL EVERY 24 HOURS
Qty: 10 TABLET | Refills: 0 | Status: SHIPPED | OUTPATIENT
Start: 2019-01-30 | End: 2019-02-04

## 2019-01-30 NOTE — PATIENT INSTRUCTIONS

## 2019-01-30 NOTE — PROGRESS NOTES
Assessment/Plan:      Diagnoses and all orders for this visit:    Bronchitis  -     azithromycin (ZITHROMAX) 250 mg tablet; Take 2 tablets (500 mg total) by mouth every 24 hours for 5 days  -     benzonatate (TESSALON PERLES) 100 mg capsule; Take 1 capsule (100 mg total) by mouth 3 (three) times a day as needed for cough for up to 7 days  Increase water intake, rest    Essential hypertension  -     losartan (COZAAR) 25 mg tablet; Take 1 tablet (25 mg total) by mouth daily    Has Rx for fasting blood work       Patient ID: Beena Mix is a 50 y o  male  HPI   Pt has cough and fatigue since Monday His wife had similar sxs  Slight wheeze and hacking cough with pnd and thick mucous  Slight fever and was not at work yesterday  Had sore throat  Feels tired and not sleeping well  Review of Systems   Constitutional: Positive for fever  HENT: Positive for congestion, postnasal drip and sore throat  Respiratory: Positive for cough and wheezing  Cardiovascular: Negative  Gastrointestinal: Negative  Genitourinary: Negative  Musculoskeletal: Positive for arthralgias  Skin: Negative for color change and rash  Neurological: Negative for dizziness and headaches  Hematological: Negative  Psychiatric/Behavioral: Negative  Past Medical History:   Diagnosis Date    Hypertension     Renal calculi     Last assessed: 1/11/18     Past Surgical History:   Procedure Laterality Date    CYSTOSCOPY W/ URETERAL STENT PLACEMENT  02/04/2014    CYSTOSCOPY W/ URETERAL STENT REMOVAL  02/11/2014    w/urteroscopy w/removal of calculus    KNEE SURGERY Right     STONE ANALYSIS (HISTORICAL)      TONSILECTOMY AND ADNOIDECTOMY       Social History     Social History    Marital status: /Civil Union     Spouse name: N/A    Number of children: N/A    Years of education: N/A     Occupational History    Not on file       Social History Main Topics    Smoking status: Never Smoker    Smokeless tobacco: Current User    Alcohol use No      Comment: Per [employer]: Occasional alcohol use    Drug use: No    Sexual activity: Not on file     Other Topics Concern    Not on file     Social History Narrative    Always uses seatbelt    Always uses sunscreen    Caffeine use    Dental care: regularly    Feels safe at home    Lives independently w/spouse         Allergies   Allergen Reactions    Penicillins GI Intolerance     Vitals:    01/30/19 0955 01/30/19 1011   BP:  138/78   Pulse: 77    Temp: 97 6 °F (36 4 °C)    TempSrc: Tympanic    SpO2: 97%    Weight: (!) 149 kg (328 lb 12 8 oz)    Height: 6' 2" (1 88 m)               Physical Exam   Constitutional: He is oriented to person, place, and time  He appears well-developed and well-nourished  No distress  HENT:   Head: Normocephalic and atraumatic  Right Ear: External ear normal    Left Ear: External ear normal    Nose: Nose normal    Mouth/Throat: Oropharyngeal exudate present  Eyes: Pupils are equal, round, and reactive to light  Conjunctivae and EOM are normal  No scleral icterus  Neck: Normal range of motion  Neck supple  No JVD present  Cardiovascular: Normal rate, regular rhythm, normal heart sounds and intact distal pulses  No murmur heard  Pulmonary/Chest: Effort normal  No respiratory distress  He has wheezes  He has no rales  Abdominal: Soft  Bowel sounds are normal    Musculoskeletal: Normal range of motion  He exhibits no edema, tenderness or deformity  Lymphadenopathy:     He has no cervical adenopathy  Neurological: He is alert and oriented to person, place, and time  He has normal reflexes  He displays normal reflexes  No cranial nerve deficit  He exhibits normal muscle tone  Coordination normal    Skin: Skin is warm and dry  He is not diaphoretic  Psychiatric: He has a normal mood and affect  His behavior is normal  Judgment and thought content normal    Nursing note and vitals reviewed

## 2019-07-10 ENCOUNTER — TELEPHONE (OUTPATIENT)
Dept: INTERNAL MEDICINE CLINIC | Facility: CLINIC | Age: 49
End: 2019-07-10

## 2019-07-10 NOTE — TELEPHONE ENCOUNTER
Called Sharon Johnson to schedule him for Dm Visit and he said he doesn't take meds for Dm so why does he have to keep getting A1C done   Pt doesn't want to continue getting these labs done as he doesn't want to keep paying a bill when his A1C is always normal  Per PT

## 2019-08-16 DIAGNOSIS — I10 ESSENTIAL HYPERTENSION: ICD-10-CM

## 2019-08-16 RX ORDER — LOSARTAN POTASSIUM 25 MG/1
25 TABLET ORAL DAILY
Qty: 30 TABLET | Refills: 5 | Status: SHIPPED | OUTPATIENT
Start: 2019-08-16 | End: 2020-02-17 | Stop reason: SDUPTHER

## 2019-09-05 DIAGNOSIS — J32.9 SINUSITIS, UNSPECIFIED CHRONICITY, UNSPECIFIED LOCATION: Primary | ICD-10-CM

## 2019-09-05 RX ORDER — LEVOFLOXACIN 500 MG/1
500 TABLET, FILM COATED ORAL EVERY 24 HOURS
Qty: 7 TABLET | Refills: 0 | Status: SHIPPED | OUTPATIENT
Start: 2019-09-05 | End: 2019-09-12

## 2019-10-02 ENCOUNTER — TELEPHONE (OUTPATIENT)
Dept: INTERNAL MEDICINE CLINIC | Facility: CLINIC | Age: 49
End: 2019-10-02

## 2020-01-02 ENCOUNTER — TELEPHONE (OUTPATIENT)
Dept: INTERNAL MEDICINE CLINIC | Facility: CLINIC | Age: 50
End: 2020-01-02

## 2020-01-02 NOTE — TELEPHONE ENCOUNTER
Patient overdue for annual exam if he is still coming to our practice  Please call to schedule    Thank you

## 2020-02-17 DIAGNOSIS — I10 ESSENTIAL HYPERTENSION: ICD-10-CM

## 2020-02-17 RX ORDER — LOSARTAN POTASSIUM 25 MG/1
25 TABLET ORAL DAILY
Qty: 30 TABLET | Refills: 0 | Status: SHIPPED | OUTPATIENT
Start: 2020-02-17 | End: 2020-03-24 | Stop reason: SDUPTHER

## 2020-02-17 NOTE — TELEPHONE ENCOUNTER
Patient wife called for refill  Spoke with wife and told her we need patient to schedule appt, he is past due for DM testing and Annual exam   Letters have been sent and patients hasn't returned phone calls    One refill sent to pharmacy

## 2020-03-24 DIAGNOSIS — I10 ESSENTIAL HYPERTENSION: ICD-10-CM

## 2020-03-24 RX ORDER — LOSARTAN POTASSIUM 25 MG/1
25 TABLET ORAL DAILY
Qty: 30 TABLET | Refills: 0 | Status: SHIPPED | OUTPATIENT
Start: 2020-03-24 | End: 2020-04-01 | Stop reason: SDUPTHER

## 2020-04-01 ENCOUNTER — TELEMEDICINE (OUTPATIENT)
Dept: FAMILY MEDICINE CLINIC | Facility: CLINIC | Age: 50
End: 2020-04-01
Payer: COMMERCIAL

## 2020-04-01 DIAGNOSIS — I10 ESSENTIAL HYPERTENSION: Primary | ICD-10-CM

## 2020-04-01 DIAGNOSIS — E29.1 HYPOGONADISM, MALE: ICD-10-CM

## 2020-04-01 DIAGNOSIS — Q98.4 KLINEFELTER'S SYNDROME: ICD-10-CM

## 2020-04-01 DIAGNOSIS — E11.9 TYPE 2 DIABETES MELLITUS WITHOUT COMPLICATION, WITHOUT LONG-TERM CURRENT USE OF INSULIN (HCC): ICD-10-CM

## 2020-04-01 PROCEDURE — 99214 OFFICE O/P EST MOD 30 MIN: CPT | Performed by: FAMILY MEDICINE

## 2020-04-01 PROCEDURE — 4010F ACE/ARB THERAPY RXD/TAKEN: CPT | Performed by: FAMILY MEDICINE

## 2020-04-01 RX ORDER — TESTOSTERONE 12.5 MG/1.25G
2 GEL TOPICAL DAILY
Qty: 1 BOTTLE | Refills: 5 | Status: SHIPPED | OUTPATIENT
Start: 2020-04-01 | End: 2020-10-27

## 2020-04-01 RX ORDER — LOSARTAN POTASSIUM 25 MG/1
50 TABLET ORAL DAILY
Qty: 90 TABLET | Refills: 3 | Status: SHIPPED | OUTPATIENT
Start: 2020-04-01 | End: 2020-10-27 | Stop reason: SDUPTHER

## 2020-08-13 ENCOUNTER — TELEPHONE (OUTPATIENT)
Dept: INTERNAL MEDICINE CLINIC | Facility: CLINIC | Age: 50
End: 2020-08-13

## 2020-08-13 NOTE — TELEPHONE ENCOUNTER
Can you please verify if this patient has transferred to Dr Guillen? Looks like he had a recent visit with this provider but his PCP wasn't changed    Thank you

## 2020-09-17 DIAGNOSIS — E11.9 TYPE 2 DIABETES MELLITUS WITHOUT COMPLICATION, WITHOUT LONG-TERM CURRENT USE OF INSULIN (HCC): Primary | ICD-10-CM

## 2020-10-27 ENCOUNTER — OFFICE VISIT (OUTPATIENT)
Dept: FAMILY MEDICINE CLINIC | Facility: CLINIC | Age: 50
End: 2020-10-27
Payer: COMMERCIAL

## 2020-10-27 VITALS
HEIGHT: 74 IN | WEIGHT: 312 LBS | HEART RATE: 89 BPM | DIASTOLIC BLOOD PRESSURE: 72 MMHG | OXYGEN SATURATION: 96 % | SYSTOLIC BLOOD PRESSURE: 120 MMHG | BODY MASS INDEX: 40.04 KG/M2 | TEMPERATURE: 98.3 F

## 2020-10-27 DIAGNOSIS — E29.1 HYPOGONADISM, MALE: ICD-10-CM

## 2020-10-27 DIAGNOSIS — Q98.4 KLINEFELTER'S SYNDROME: ICD-10-CM

## 2020-10-27 DIAGNOSIS — I10 ESSENTIAL HYPERTENSION: Primary | ICD-10-CM

## 2020-10-27 DIAGNOSIS — M75.82 TENDINITIS OF LEFT PECTORALIS MAJOR: ICD-10-CM

## 2020-10-27 DIAGNOSIS — J32.9 SINUSITIS, UNSPECIFIED CHRONICITY, UNSPECIFIED LOCATION: ICD-10-CM

## 2020-10-27 PROBLEM — E66.01 MORBID OBESITY WITH BODY MASS INDEX (BMI) OF 40.0 OR HIGHER (HCC): Status: ACTIVE | Noted: 2017-08-22

## 2020-10-27 PROCEDURE — 99214 OFFICE O/P EST MOD 30 MIN: CPT | Performed by: FAMILY MEDICINE

## 2020-10-27 PROCEDURE — 3725F SCREEN DEPRESSION PERFORMED: CPT | Performed by: FAMILY MEDICINE

## 2020-10-27 PROCEDURE — 4010F ACE/ARB THERAPY RXD/TAKEN: CPT | Performed by: FAMILY MEDICINE

## 2020-10-27 PROCEDURE — 3078F DIAST BP <80 MM HG: CPT | Performed by: FAMILY MEDICINE

## 2020-10-27 PROCEDURE — 3074F SYST BP LT 130 MM HG: CPT | Performed by: FAMILY MEDICINE

## 2020-10-27 PROCEDURE — 3008F BODY MASS INDEX DOCD: CPT | Performed by: FAMILY MEDICINE

## 2020-10-27 RX ORDER — SYRINGE, DISPOSABLE, 3 ML
SYRINGE, EMPTY DISPOSABLE MISCELLANEOUS
Qty: 25 EACH | Refills: 0 | Status: SHIPPED | OUTPATIENT
Start: 2020-10-27 | End: 2021-07-09 | Stop reason: SDUPTHER

## 2020-10-27 RX ORDER — TESTOSTERONE CYPIONATE 200 MG/ML
200 VIAL (ML) INTRAMUSCULAR
Qty: 5 ML | Refills: 5 | Status: SHIPPED | OUTPATIENT
Start: 2020-10-27 | End: 2021-07-09 | Stop reason: SDUPTHER

## 2020-10-27 RX ORDER — LOSARTAN POTASSIUM 25 MG/1
25 TABLET ORAL DAILY
Qty: 90 TABLET | Refills: 3 | Status: SHIPPED | OUTPATIENT
Start: 2020-10-27 | End: 2022-03-28

## 2020-10-27 RX ORDER — AZITHROMYCIN 250 MG/1
TABLET, FILM COATED ORAL
Qty: 6 TABLET | Refills: 0 | Status: SHIPPED | OUTPATIENT
Start: 2020-10-27 | End: 2020-10-31

## 2021-02-25 ENCOUNTER — TELEPHONE (OUTPATIENT)
Dept: FAMILY MEDICINE CLINIC | Facility: CLINIC | Age: 51
End: 2021-02-25

## 2021-05-03 ENCOUNTER — RA CDI HCC (OUTPATIENT)
Dept: OTHER | Facility: HOSPITAL | Age: 51
End: 2021-05-03

## 2021-05-06 ENCOUNTER — VBI (OUTPATIENT)
Dept: ADMINISTRATIVE | Facility: OTHER | Age: 51
End: 2021-05-06

## 2021-05-11 ENCOUNTER — RA CDI HCC (OUTPATIENT)
Dept: OTHER | Facility: HOSPITAL | Age: 51
End: 2021-05-11

## 2021-05-11 NOTE — PROGRESS NOTES
[facility]  coding opportunities          Chart reviewed, no opportunity found: CHART REVIEWED, NO OPPORTUNITY FOUND              Patients insurance company: [employer] (Medicare Advantage and Commercial)

## 2021-07-01 ENCOUNTER — RA CDI HCC (OUTPATIENT)
Dept: OTHER | Facility: HOSPITAL | Age: 51
End: 2021-07-01

## 2021-07-01 NOTE — PROGRESS NOTES
[facility] 75  coding opportunities          Chart reviewed, no opportunity found: CHART REVIEWED, NO OPPORTUNITY FOUND                     Patients insurance company: [employer] (Medicare Advantage and Commercial)

## 2021-07-09 ENCOUNTER — OFFICE VISIT (OUTPATIENT)
Dept: FAMILY MEDICINE CLINIC | Facility: CLINIC | Age: 51
End: 2021-07-09
Payer: COMMERCIAL

## 2021-07-09 VITALS
SYSTOLIC BLOOD PRESSURE: 124 MMHG | HEART RATE: 91 BPM | BODY MASS INDEX: 40.43 KG/M2 | HEIGHT: 74 IN | OXYGEN SATURATION: 96 % | DIASTOLIC BLOOD PRESSURE: 76 MMHG | TEMPERATURE: 98.9 F | WEIGHT: 315 LBS

## 2021-07-09 DIAGNOSIS — Z11.59 NEED FOR HEPATITIS C SCREENING TEST: ICD-10-CM

## 2021-07-09 DIAGNOSIS — Q98.4 KLINEFELTER'S SYNDROME: ICD-10-CM

## 2021-07-09 DIAGNOSIS — E29.1 HYPOGONADISM, MALE: ICD-10-CM

## 2021-07-09 DIAGNOSIS — I10 ESSENTIAL HYPERTENSION: ICD-10-CM

## 2021-07-09 DIAGNOSIS — Z12.11 SCREENING FOR COLORECTAL CANCER: ICD-10-CM

## 2021-07-09 DIAGNOSIS — Z12.12 SCREENING FOR COLORECTAL CANCER: ICD-10-CM

## 2021-07-09 DIAGNOSIS — E78.5 HYPERLIPIDEMIA, UNSPECIFIED HYPERLIPIDEMIA TYPE: Primary | ICD-10-CM

## 2021-07-09 DIAGNOSIS — Z11.4 SCREENING FOR HIV (HUMAN IMMUNODEFICIENCY VIRUS): ICD-10-CM

## 2021-07-09 DIAGNOSIS — Z23 ENCOUNTER FOR IMMUNIZATION: ICD-10-CM

## 2021-07-09 PROCEDURE — 99214 OFFICE O/P EST MOD 30 MIN: CPT | Performed by: FAMILY MEDICINE

## 2021-07-09 PROCEDURE — 3078F DIAST BP <80 MM HG: CPT | Performed by: FAMILY MEDICINE

## 2021-07-09 PROCEDURE — 3008F BODY MASS INDEX DOCD: CPT | Performed by: FAMILY MEDICINE

## 2021-07-09 PROCEDURE — 3074F SYST BP LT 130 MM HG: CPT | Performed by: FAMILY MEDICINE

## 2021-07-09 PROCEDURE — 1036F TOBACCO NON-USER: CPT | Performed by: FAMILY MEDICINE

## 2021-07-09 PROCEDURE — 3725F SCREEN DEPRESSION PERFORMED: CPT | Performed by: FAMILY MEDICINE

## 2021-07-09 RX ORDER — TESTOSTERONE CYPIONATE 200 MG/ML
200 VIAL (ML) INTRAMUSCULAR
Qty: 5 ML | Refills: 5 | Status: SHIPPED | OUTPATIENT
Start: 2021-07-09 | End: 2022-02-05 | Stop reason: HOSPADM

## 2021-07-09 RX ORDER — SYRINGE, DISPOSABLE, 3 ML
SYRINGE, EMPTY DISPOSABLE MISCELLANEOUS
Qty: 25 EACH | Refills: 0 | Status: SHIPPED | OUTPATIENT
Start: 2021-07-09

## 2021-07-09 NOTE — PROGRESS NOTES
Assessment/Plan:    No problem-specific Assessment & Plan notes found for this encounter  Diagnoses and all orders for this visit:    Hyperlipidemia, unspecified hyperlipidemia type  -     Lipid panel; Future  -     Comprehensive metabolic panel; Future  -     Testosterone, free, total; Future  -     CBC and differential; Future  -     PSA, Total Screen; Future    Need for hepatitis C screening test  -     Hepatitis C Antibody ([facility]); Future  -     Lipid panel; Future  -     Comprehensive metabolic panel; Future  -     Testosterone, free, total; Future  -     CBC and differential; Future  -     PSA, Total Screen; Future    Encounter for immunization  -     Lipid panel; Future  -     Comprehensive metabolic panel; Future  -     Testosterone, free, total; Future  -     CBC and differential; Future  -     PSA, Total Screen; Future    Screening for HIV (human immunodeficiency virus)  -     HIV 1/2 Antigen/Antibody (4th Generation) w Reflex [facility]; Future  -     Lipid panel; Future  -     Comprehensive metabolic panel; Future  -     Testosterone, free, total; Future  -     CBC and differential; Future  -     PSA, Total Screen; Future    Screening for colorectal cancer  -     Cologuard; Future  -     Lipid panel; Future  -     Comprehensive metabolic panel; Future  -     Testosterone, free, total; Future  -     CBC and differential; Future  -     PSA, Total Screen; Future    Essential hypertension  -     Testosterone Cypionate 200 MG/ML SOLN; Inject 200 mg as directed every 21 days  -     Syringe, Disposable, (2-3CC SYRINGE) 3 ML MISC; Use every 21 days  -     Lipid panel; Future  -     Comprehensive metabolic panel; Future  -     Testosterone, free, total; Future  -     CBC and differential; Future  -     PSA, Total Screen;  Future    Klinefelter's syndrome  -     Testosterone Cypionate 200 MG/ML SOLN; Inject 200 mg as directed every 21 days  -     Syringe, Disposable, (2-3CC SYRINGE) 3 ML MISC; Use every 21 days  -     Lipid panel; Future  -     Comprehensive metabolic panel; Future  -     Testosterone, free, total; Future  -     CBC and differential; Future  -     PSA, Total Screen; Future    Hypogonadism, male  -     Testosterone Cypionate 200 MG/ML SOLN; Inject 200 mg as directed every 21 days  -     Syringe, Disposable, (2-3CC SYRINGE) 3 ML MISC; Use every 21 days  -     Lipid panel; Future  -     Comprehensive metabolic panel; Future  -     Testosterone, free, total; Future  -     CBC and differential; Future  -     PSA, Total Screen; Future    Other orders  -     Cancel: PNEUMOCOCCAL POLYSACCHARIDE VACCINE 23-VALENT =>1YO SQ IM  -     Cancel: TDAP VACCINE GREATER THAN OR EQUAL TO 8YO IM          Subjective:      Patient ID: Roger Montoya is a 46 y o  male  He has Klinefelter's syndrome and does not make testosterone  He felt well on his previous dose of injectable testosterone  He had no side effects  Without his medication, he has decreased energy, sexual dysfunction, and weight gain  There is no contraindication for are a 's license  His blood pressure is under excellent control in the office today  He has no headache or vision changes  He has no side effects from his current regimen  He has no chest pain or shortness of breath  He has no PND, orthopnea, or dyspnea on exertion  There should be no contraindication to a 's license  He had type 2 diabetes mellitus listed as a diagnosis  I have no laboratory confirmation of this diagnosis  He is safe to drive  The following portions of the patient's history were reviewed and updated as appropriate:   He  has a past medical history of Hypertension and Renal calculi    He   Patient Active Problem List    Diagnosis Date Noted    Bronchitis 01/30/2019    Hyperlipidemia 01/30/2019    Injury of right wrist 02/22/2018    Infected cyst of skin 02/22/2018    Hypogonadism, male 09/26/2017    Klinefelter's syndrome 09/25/2017    Sleep disturbances 09/25/2017    Hypertension 08/22/2017    Morbid obesity with body mass index (BMI) of 40 0 or higher ([facility] ) 08/22/2017    Nephrolithiasis 02/04/2014     He  has a past surgical history that includes Knee surgery (Right); STONE ANALYSIS (HISTORICAL); TONSILECTOMY AND ADNOIDECTOMY; Cystoscopy w/ ureteral stent placement (02/04/2014); and Cystoscopy w/ ureteral stent removal (02/11/2014)  His family history includes Diabetes in his mother; Hypertension in his father and mother; Stroke in his mother  He  reports that he has never smoked  He uses smokeless tobacco  He reports that he does not drink alcohol and does not use drugs  Current Outpatient Medications   Medication Sig Dispense Refill    ibuprofen (MOTRIN) 800 mg tablet Take 1 tablet by mouth every 6 (six) hours as needed (p r n  pain) 30 tablet 0    losartan (COZAAR) 25 mg tablet Take 1 tablet (25 mg total) by mouth daily 90 tablet 3    Syringe, Disposable, (2-3CC SYRINGE) 3 ML MISC Use every 21 days 25 each 0    Testosterone Cypionate 200 MG/ML SOLN Inject 200 mg as directed every 21 days 5 mL 5     No current facility-administered medications for this visit  Current Outpatient Medications on File Prior to Visit   Medication Sig    ibuprofen (MOTRIN) 800 mg tablet Take 1 tablet by mouth every 6 (six) hours as needed (p r n  pain)    losartan (COZAAR) 25 mg tablet Take 1 tablet (25 mg total) by mouth daily    [DISCONTINUED] Syringe, Disposable, (2-3CC SYRINGE) 3 ML MISC by Does not apply route every 21 days    [DISCONTINUED] Testosterone Cypionate 200 MG/ML SOLN Inject 200 mg as directed every 21 days     No current facility-administered medications on file prior to visit  He is allergic to penicillins       Review of Systems   All other systems reviewed and are negative          Objective:      /76   Pulse 91   Temp 98 9 °F (37 2 °C)   Ht 6' 2" (1 88 m)   Wt (!) 144 kg (318 lb)   SpO2 96%   BMI 40 83 kg/m²          Physical Exam  Vitals and nursing note reviewed  Constitutional:       Appearance: Normal appearance  He is obese  HENT:      Head: Normocephalic and atraumatic  Cardiovascular:      Rate and Rhythm: Normal rate and regular rhythm  Pulses: Normal pulses  Heart sounds: Normal heart sounds  Pulmonary:      Effort: Pulmonary effort is normal       Breath sounds: Normal breath sounds  Abdominal:      General: Abdomen is flat  Bowel sounds are normal       Palpations: Abdomen is soft  Musculoskeletal:         General: Normal range of motion  Cervical back: Normal range of motion and neck supple  Skin:     General: Skin is warm and dry  Capillary Refill: Capillary refill takes less than 2 seconds  Neurological:      General: No focal deficit present  Mental Status: He is alert and oriented to person, place, and time  Mental status is at baseline  Psychiatric:         Mood and Affect: Mood normal          Behavior: Behavior normal          Thought Content:  Thought content normal          Judgment: Judgment normal

## 2021-07-09 NOTE — PROGRESS NOTES
Tobacco Cessation Counseling:  The patient is sincerely urged to quit consumption of tobacco  He is not ready to quit tobacco

## 2021-07-09 NOTE — PROGRESS NOTES
BMI Counseling: Body mass index is 40 83 kg/m²  The BMI is above normal  Nutrition recommendations include reducing portion sizes, decreasing overall calorie intake, 3-5 servings of fruits/vegetables daily, reducing fast food intake, consuming healthier snacks, decreasing soda and/or juice intake, moderation in carbohydrate intake, increasing intake of lean protein, reducing intake of saturated fat and trans fat and reducing intake of cholesterol  Exercise recommendations include moderate aerobic physical activity for 150 minutes/week, vigorous aerobic physical activity for 75 minutes/week, exercising 3-5 times per week, joining a gym and strength training exercises

## 2021-07-09 NOTE — PATIENT INSTRUCTIONS
Chronic Hypertension   AMBULATORY CARE:   Hypertension  is high blood pressure  Your blood pressure is the force of your blood moving against the walls of your arteries  Hypertension causes your blood pressure to get so high that your heart has to work much harder than normal  This can damage your heart  Even if you have hypertension for years, lifestyle changes, medicines, or both can help bring your blood pressure to normal   Call 911 for any of the following:   · You have chest pain  · You have any of the following signs of a heart attack:      ? Squeezing, pressure, or pain in your chest    ? You may  also have any of the following:     § Discomfort or pain in your back, neck, jaw, stomach, or arm    § Shortness of breath    § Nausea or vomiting    § Lightheadedness or a sudden cold sweat    · You become confused or have difficulty speaking  · You suddenly feel lightheaded or have trouble breathing  Seek care immediately if:   · You have a severe headache or vision loss  · You have weakness in an arm or leg  Contact your healthcare provider if:   · You feel faint, dizzy, confused, or drowsy  · You have been taking your blood pressure medicine but your pressure is higher than your provider says it should be  · You have questions or concerns about your condition or care  Treatment for chronic hypertension  may include medicine to lower your blood pressure and cholesterol levels  A low cholesterol level helps prevent heart disease and makes it easier to control your blood pressure  Heart disease can make your blood pressure harder to control  You may also need to make lifestyle changes  What you need to know about the stages of hypertension:       · Normal blood pressure is 119/79 or lower   Your healthcare provider may only check your blood pressure each year if it stays at a normal level  · Elevated blood pressure is 120/79 to 129/79   This is sometimes called prehypertension   Your healthcare provider may suggest lifestyle changes to help lower your blood pressure to a normal level  He or she may then check it again in 3 to 6 months  · Stage 1 hypertension is 130/80  to 139/89   Your provider may recommend lifestyle changes, medication, and checks every 3 to 6 months until your blood pressure is controlled  · Stage 2 hypertension is 140/90 or higher   Your provider will recommend lifestyle changes and have you take 2 kinds of hypertension medicines  You will also need to have your blood pressure checked monthly until it is controlled  Manage chronic hypertension:   · Check your blood pressure at home  Avoid smoking, caffeine, and exercise at least 30 minutes before checking your blood pressure  Sit and rest for 5 minutes before you take your blood pressure  Extend your arm and support it on a flat surface  Your arm should be at the same level as your heart  Follow the directions that came with your blood pressure monitor  Check your blood pressure 2 times, 1 minute apart, before you take your medicine in the morning  Also check your blood pressure before your evening meal  Keep a record of your readings and bring it to your follow-up visits  Ask your healthcare provider what your blood pressure should be  · Manage any other health conditions you have  Health conditions such as diabetes can increase your risk for hypertension  Follow your healthcare provider's instructions and take all your medicines as directed  Talk to your healthcare provider about any new health conditions you have recently developed  · Ask about all medicines  Certain medicines can increase your blood pressure  Examples include oral birth control pills, decongestants, herbal supplements, and NSAIDs, such as ibuprofen  Your healthcare provider can tell you which medicines are safe for you to take  This includes prescription and over-the-counter medicines      Lifestyle changes you can make to lower your blood pressure: Your provider may want you to make more lifestyle changes if you are having trouble controlling your blood pressure  This may feel difficult over time, especially if you think you are making good changes but your pressure is still high  It might help to focus on one new change at a time  For example, try to add 1 more day of exercise, or exercise for an extra 10 minutes on 2 days  Small changes can make a big difference  Your healthcare provider can also refer you to specialists such as a dietitian who can help you make small changes  · Limit sodium (salt) as directed  Too much sodium can affect your fluid balance  Check labels to find low-sodium or no-salt-added foods  Some low-sodium foods use potassium salts for flavor  Too much potassium can also cause health problems  Your healthcare provider will tell you how much sodium and potassium are safe for you to have in a day  He or she may recommend that you limit sodium to 2,300 mg a day  · Follow the meal plan recommended by your healthcare provider  A dietitian or your provider can give you more information on low-sodium plans or the DASH (Dietary Approaches to Stop Hypertension) eating plan  The DASH plan is low in sodium, unhealthy fats, and total fat  It is high in potassium, calcium, and fiber  · Exercise to maintain a healthy weight  Exercise at least 30 minutes per day, on most days of the week  This will help decrease your blood pressure  Ask your healthcare provider about the best exercise plan for you  · Decrease stress  This may help lower your blood pressure  Learn ways to relax, such as deep breathing or listening to music  · Limit alcohol as directed  Alcohol can increase your blood pressure  A drink of alcohol is 12 ounces of beer, 5 ounces of wine, or 1½ ounces of liquor  · Do not smoke    Nicotine and other chemicals in cigarettes and cigars can increase your blood pressure and also cause lung damage  Ask your healthcare provider for information if you currently smoke and need help to quit  E-cigarettes or smokeless tobacco still contain nicotine  Talk to your healthcare provider before you use these products  Follow up with your healthcare provider as directed: You will need to return to have your blood pressure checked and to have other lab tests done  Write down your questions so you remember to ask them during your visits  © Copyright [address] Information is for End User's use only and may not be sold, redistributed or otherwise used for commercial purposes  All illustrations and images included in CareNotes® are the copyrighted property of [employer]  or [employer]   The above information is an  only  It is not intended as medical advice for individual conditions or treatments  Talk to your doctor, nurse or pharmacist before following any medical regimen to see if it is safe and effective for you  Mediterranean Diet   AMBULATORY CARE:   A Mediterranean diet  is a meal plan that includes foods that are commonly eaten in countries that border the [facility]  This meal plan may provide several health benefits  These include losing or maintaining weight, and decreasing blood pressure, blood sugar, and cholesterol levels  It may also help protect against certain health conditions such as heart disease, cancer, type 2 diabetes, and Alzheimer disease  Work with a dietitian to develop a meal plan that is right for you  Foods to include in the [address] diet:   · Include fruits and vegetables in each meal   Eat a variety of fresh fruits and vegetables  · Choose whole grains every day  These foods include whole-grain breads, pastas, and cereals  It also includes brown rice, quinoa, and millet  · Use unsaturated fats instead of saturated fats  Cook with olive or canola oil  Limit saturated fats, such as butter, margarine, and shortening  Saturated fat is an unhealthy fat that can increase your cholesterol levels  · Choose plant foods, poultry, and fish as your main sources of protein  ? Eat plant-based foods that provide protein,  such as lentils, beans, chickpeas, nuts, and seeds  Choose mostly plant-based foods in place of meat on most days of the week  ? Eat protein foods high in omega-3 fats  Fish high in omega-3 fats include salmon, trout, and tuna  Include these types of fish 1 or 2 times each week  Limit fish high in mercury, such as shark, swordfish, tilefish, and yuridia mackerel  Omega-3 fats are also found in walnuts and flaxseed  ? Choose poultry (chicken or turkey)  without skin instead of red meat  Red meat is high in saturated fat  Limit eggs and high-fat meats, such as ward, sausage, and hot dogs  · Choose low-fat dairy foods  such as nonfat or 1% milk, or low-fat almond, cashew, or soy milk  Other examples include low-fat cheese, yogurt, and cottage cheese  · Limit sweets  Limit your intake of high-sugar foods, such as soda, desserts, and candy  · Talk to your healthcare provider about alcohol  Studies have shown that moderate intake of wine may reduce the risk of heart disease  A moderate amount of wine is 1 serving for women and men 65 years and older each day  Two servings is recommended for men 24to 59years of age each day  A serving of wine is 5 ounces  Other things you need to know if you follow the Mediterranean diet:   · Include foods high in iron and vitamin C   Plant-based foods that are high in iron include spinach, beans, tofu, and artichoke  Eat a serving of vitamin C with any iron-rich food to help your body absorb more iron  Examples include oranges, strawberries, cantaloupe, broccoli, and yellow peppers  · Get regular physical activity  The Mediterranean diet will have the most benefit if you get regular physical activity   Get 30 minutes of physical activity at least 5 days a week  Choose physical activities that increase your heart rate  Examples include walking, hiking, swimming, and riding a bike  Ask your healthcare provider about the best exercise plan for you  © Copyright [address] Information is for End User's use only and may not be sold, redistributed or otherwise used for commercial purposes  All illustrations and images included in CareNotes® are the copyrighted property of [employer] , [employer]  or [address] [place], [place] [address]pa[facility]  The above information is an  only  It is not intended as medical advice for individual conditions or treatments  Talk to your doctor, nurse or pharmacist before following any medical regimen to see if it is safe and effective for you  DASH Eating Plan   WHAT YOU NEED TO KNOW:   The DASH (Dietary Approaches to Stop Hypertension) Eating Plan is designed to help prevent or lower high blood pressure  It can also help to lower LDL (bad) cholesterol and decrease your risk of heart disease  The plan is low in sodium, sugar, unhealthy fats, and total fat  It is high in potassium, calcium, magnesium, and fiber  These nutrients are added when you eat more fruits, vegetables, and whole grains  DISCHARGE INSTRUCTIONS:   Your sodium limit each day: Your dietitian will tell you how much sodium is safe for you to have each day  People with high blood pressure should have no more than 1,500 to 2,300 mg of sodium in a day  A teaspoon (tsp) of salt has 2,300 mg of sodium  This may seem like a difficult goal, but small changes to the foods you eat can make a big difference  Your healthcare provider or dietitian can help you create a meal plan that follows your sodium limit  How to limit sodium:   · Read food labels  Food labels can help you choose foods that are low in sodium  The amount of sodium is listed in milligrams (mg)   The % Daily Value (DV) column tells you how much of your daily needs are met by 1 serving of the food for each nutrient listed  Choose foods that have less than 5% of the DV of sodium  These foods are considered low in sodium  Foods that have 20% or more of the DV of sodium are considered high in sodium  Avoid foods that have more than 300 mg of sodium in each serving  Choose foods that say low-sodium, reduced-sodium, or no salt added on the food label  · Avoid salt  Do not salt food at the table, and add very little salt to foods during cooking  Use herbs and spices, such as onions, garlic, and salt-free seasonings to add flavor to foods  Try lemon or lime juice or vinegar to give foods a tart flavor  Use hot peppers or a small amount of hot pepper sauce to add a spicy flavor to foods  · Ask about salt substitutes  Ask your healthcare provider if you may use salt substitutes  Some salt substitutes have ingredients that can be harmful if you have certain health conditions  · Choose foods carefully at restaurants  Meals from restaurants, especially fast food restaurants, are often high in sodium  Some restaurants have nutrition information that tells you the amount of sodium in their foods  Ask to have your food prepared with less, or no salt  What you need to know about fats:   · Include healthy fats  Examples are unsaturated fats and omega-3 fatty acids  Unsaturated fats are found in soybean, canola, olive, or sunflower oil, and liquid and soft tub margarines  Omega-3 fatty acids are found in fatty fish, such as salmon, tuna, mackerel, and sardines  It is also found in flaxseed oil and ground flaxseed  · Avoid unhealthy fats  Do not eat unhealthy fats, such as saturated fats and trans fats  Saturated fats are found in foods that contain fat from animals  Examples are fatty meats, whole milk, butter, cream, and other dairy foods  It is also found in shortening, stick margarine, palm oil, and coconut oil   Trans fats are found in fried foods, crackers, chips, and baked goods made with margarine or shortening  Foods to include: With the DASH eating plan, you need to eat a certain number of servings from each food group  This will help you get enough of certain nutrients and limit others  The amount of servings you should eat depends on how many calories you need  Your dietitian can tell you how many calories you need  The number of servings listed next to the food groups below are for people who need about 2,000 calories each day  · Grains:  6 to 8 servings (3 of these servings should be whole-grain foods)    ? 1 slice of whole-grain bread     ? 1 ounce of dry cereal    ? ½ cup of cooked cereal, pasta, or brown rice    · Vegetables and fruits:  4 to 5 servings of fruits and 4 to 5 servings of vegetables    ? 1 medium fruit    ? ½ cup of frozen, canned (no added salt), or chopped fresh vegetables     ? ½ cup of fresh, frozen, dried, or canned fruit (canned in light syrup or fruit juice)    ? ½ cup of vegetable or fruit juice    · Dairy:  2 to 3 servings    ? 1 cup of nonfat (skim) or 1% milk    ? 1½ ounces of fat-free or low-fat cheese    ? 6 ounces of nonfat or low-fat yogurt    · Lean meat, poultry, and fish:  6 ounces or less    ? Poultry (chicken, turkey) with no skin    ? Fish (especially fatty fish, such as salmon, fresh tuna, or mackerel)    ? Lean beef and pork (loin, round, extra lean hamburger)    ? Egg whites and egg substitutes    · Nuts, seeds, and legumes:  4 to 5 servings each week    ? ½ cup of cooked beans and peas    ? 1½ ounces of unsalted nuts    ? 2 tablespoons of peanut butter or seeds    · Sweets and added sugars:  5 or less each week    ? 1 tablespoon of sugar, jelly, or jam    ? ½ cup of sorbet or gelatin    ? 1 cup of lemonade    · Fats:  2 to 3 servings each week    ? 1 teaspoon of soft margarine or vegetable oil    ? 1 tablespoon of mayonnaise    ?  2 tablespoons of salad dressing    Foods to avoid:   · Grains:      ? Baked goods, such as doughnuts, pastries, cookies, and biscuits (high in fat and sugar)    ? Mixes for cornbread and biscuits, packaged foods, such as bread stuffing, rice and pasta mixes, macaroni and cheese, and instant cereals (high in sodium)    · Fruits and vegetables:      ? Regular, canned vegetables (high in sodium)    ? Sauerkraut, pickled vegetables, and other foods prepared in brine (high in sodium)    ? Fried vegetables or vegetables in butter or high-fat sauces    ? Fruit in cream or butter sauce (high in fat)    · Dairy:      ? Whole milk, 2% milk, and cream (high in fat)    ? Regular cheese and processed cheese (high in fat and sodium)    · Meats and protein foods:      ? Smoked or cured meat, such as corned beef, ward, ham, hot dogs, and sausage (high in fat and sodium)    ? Canned beans and canned meats or spreads, such as potted meats, sardines, anchovies, and imitation seafood (high in sodium)    ? Deli or lunch meats, such as bologna, ham, turkey, and roast beef (high in sodium)    ? High-fat meat (T-bone steak, regular hamburger, and ribs)    ? Whole eggs and egg yolks (high in fat)    · Other:      ? Seasonings made with salt, such as garlic salt, celery salt, onion salt, seasoned salt, meat tenderizers, and monosodium glutamate (MSG)    ? Miso soup and canned or dried soup mixes (high in sodium)    ? Regular soy sauce, barbecue sauce, teriyaki sauce, steak sauce, Worcestershire sauce, and most flavored vinegars (high in sodium)    ? Regular condiments, such as mustard, ketchup, and salad dressings (high in sodium)    ? Gravy and sauces, such as Júnior or cheese sauces (high in sodium and fat)    ? Drinks high in sugar, such as soda or fruit drinks    ? Snack foods, such as salted chips, popcorn, pretzels, pork rinds, salted crackers, and salted nuts    ? Frozen foods, such as dinners, entrees, vegetables with sauces, and breaded meats (high in sodium)    Other guidelines to follow:   · Maintain a healthy weight    Your risk for heart disease is higher if you are overweight  Your healthcare provider may suggest that you lose weight if you are overweight  You can lose weight by eating fewer calories and foods that have added sugars and fat  The DASH meal plan can help you do this  Decrease calories by eating smaller portions at each meal and fewer snacks  Ask your healthcare provider for more information about how to lose weight  · Exercise regularly  Regular exercise can help you reach or maintain a healthy weight  Regular exercise can also help decrease your blood pressure and improve your cholesterol levels  Get 30 minutes or more of moderate exercise each day of the week  To lose weight, get at least 60 minutes of exercise  Talk to your healthcare provider about the best exercise program for you  · Limit alcohol  Women should limit alcohol to 1 drink a day  Men should limit alcohol to 2 drinks a day  A drink of alcohol is 12 ounces of beer, 5 ounces of wine, or 1½ ounces of liquor  © Copyright [address] [place] [address] Drive Information is for End User's use only and may not be sold, redistributed or otherwise used for commercial purposes  All illustrations and images included in CareNotes® are the copyrighted property of A D A M , Inc  or [facility] Marguerite Gonzalez   The above information is an  only  It is not intended as medical advice for individual conditions or treatments  Talk to your doctor, nurse or pharmacist before following any medical regimen to see if it is safe and effective for you

## 2021-07-10 ENCOUNTER — LAB (OUTPATIENT)
Dept: LAB | Facility: HOSPITAL | Age: 51
End: 2021-07-10
Payer: COMMERCIAL

## 2021-07-10 DIAGNOSIS — Z12.12 SCREENING FOR COLORECTAL CANCER: ICD-10-CM

## 2021-07-10 DIAGNOSIS — E29.1 HYPOGONADISM, MALE: ICD-10-CM

## 2021-07-10 DIAGNOSIS — Z23 ENCOUNTER FOR IMMUNIZATION: ICD-10-CM

## 2021-07-10 DIAGNOSIS — I10 ESSENTIAL HYPERTENSION: ICD-10-CM

## 2021-07-10 DIAGNOSIS — E11.9 TYPE 2 DIABETES MELLITUS WITHOUT COMPLICATION, WITHOUT LONG-TERM CURRENT USE OF INSULIN (HCC): ICD-10-CM

## 2021-07-10 DIAGNOSIS — Z11.4 SCREENING FOR HIV (HUMAN IMMUNODEFICIENCY VIRUS): ICD-10-CM

## 2021-07-10 DIAGNOSIS — Z11.59 NEED FOR HEPATITIS C SCREENING TEST: ICD-10-CM

## 2021-07-10 DIAGNOSIS — Q98.4 KLINEFELTER'S SYNDROME: ICD-10-CM

## 2021-07-10 DIAGNOSIS — Z12.11 SCREENING FOR COLORECTAL CANCER: ICD-10-CM

## 2021-07-10 DIAGNOSIS — E78.5 HYPERLIPIDEMIA, UNSPECIFIED HYPERLIPIDEMIA TYPE: ICD-10-CM

## 2021-07-10 LAB
ALBUMIN SERPL BCP-MCNC: 4.1 G/DL (ref 3.5–5.7)
ALP SERPL-CCNC: 58 U/L (ref 40–150)
ALT SERPL W P-5'-P-CCNC: 39 U/L (ref 7–52)
ANION GAP SERPL CALCULATED.3IONS-SCNC: 9 MMOL/L (ref 4–13)
AST SERPL W P-5'-P-CCNC: 19 U/L (ref 13–39)
BASOPHILS # BLD AUTO: 0.1 THOUSANDS/ΜL (ref 0–0.1)
BASOPHILS NFR BLD AUTO: 1 % (ref 0–2)
BILIRUB SERPL-MCNC: 1.8 MG/DL (ref 0.2–1)
BUN SERPL-MCNC: 15 MG/DL (ref 7–25)
CALCIUM SERPL-MCNC: 9.9 MG/DL (ref 8.6–10.5)
CHLORIDE SERPL-SCNC: 99 MMOL/L (ref 98–107)
CHOLEST SERPL-MCNC: 206 MG/DL (ref 0–200)
CO2 SERPL-SCNC: 26 MMOL/L (ref 21–31)
CREAT SERPL-MCNC: 0.88 MG/DL (ref 0.7–1.3)
EOSINOPHIL # BLD AUTO: 0.3 THOUSAND/ΜL (ref 0–0.61)
EOSINOPHIL NFR BLD AUTO: 5 % (ref 0–5)
ERYTHROCYTE [DISTWIDTH] IN BLOOD BY AUTOMATED COUNT: 13.6 % (ref 11.5–14.5)
EST. AVERAGE GLUCOSE BLD GHB EST-MCNC: 246 MG/DL
GFR SERPL CREATININE-BSD FRML MDRD: 99 ML/MIN/1.73SQ M
GLUCOSE P FAST SERPL-MCNC: 314 MG/DL (ref 65–99)
HBA1C MFR BLD: 10.2 %
HCT VFR BLD AUTO: 46.8 % (ref 42–47)
HCV AB SER QL: NORMAL
HDLC SERPL-MCNC: 33 MG/DL
HGB BLD-MCNC: 16.2 G/DL (ref 14–18)
LDLC SERPL CALC-MCNC: 138 MG/DL (ref 0–100)
LYMPHOCYTES # BLD AUTO: 2.5 THOUSANDS/ΜL (ref 0.6–4.47)
LYMPHOCYTES NFR BLD AUTO: 38 % (ref 21–51)
MCH RBC QN AUTO: 29.6 PG (ref 26–34)
MCHC RBC AUTO-ENTMCNC: 34.6 G/DL (ref 31–37)
MCV RBC AUTO: 86 FL (ref 81–99)
MONOCYTES # BLD AUTO: 0.4 THOUSAND/ΜL (ref 0.17–1.22)
MONOCYTES NFR BLD AUTO: 6 % (ref 2–12)
NEUTROPHILS # BLD AUTO: 3.4 THOUSANDS/ΜL (ref 1.4–6.5)
NEUTS SEG NFR BLD AUTO: 51 % (ref 42–75)
NONHDLC SERPL-MCNC: 173 MG/DL
PLATELET # BLD AUTO: 174 THOUSANDS/UL (ref 149–390)
PMV BLD AUTO: 9.4 FL (ref 8.6–11.7)
POTASSIUM SERPL-SCNC: 4.3 MMOL/L (ref 3.5–5.5)
PROT SERPL-MCNC: 7.5 G/DL (ref 6.4–8.9)
PSA SERPL-MCNC: 0.1 NG/ML (ref 0–4)
RBC # BLD AUTO: 5.46 MILLION/UL (ref 4.3–5.9)
SODIUM SERPL-SCNC: 134 MMOL/L (ref 134–143)
TRIGL SERPL-MCNC: 175 MG/DL (ref 44–166)
TSH SERPL DL<=0.05 MIU/L-ACNC: 1.63 UIU/ML (ref 0.45–5.33)
WBC # BLD AUTO: 6.6 THOUSAND/UL (ref 4.8–10.8)

## 2021-07-10 PROCEDURE — 87389 HIV-1 AG W/HIV-1&-2 AB AG IA: CPT

## 2021-07-10 PROCEDURE — 3046F HEMOGLOBIN A1C LEVEL >9.0%: CPT | Performed by: FAMILY MEDICINE

## 2021-07-10 PROCEDURE — 85025 COMPLETE CBC W/AUTO DIFF WBC: CPT

## 2021-07-10 PROCEDURE — 80053 COMPREHEN METABOLIC PANEL: CPT

## 2021-07-10 PROCEDURE — 84403 ASSAY OF TOTAL TESTOSTERONE: CPT

## 2021-07-10 PROCEDURE — 80061 LIPID PANEL: CPT

## 2021-07-10 PROCEDURE — 84443 ASSAY THYROID STIM HORMONE: CPT

## 2021-07-10 PROCEDURE — 84402 ASSAY OF FREE TESTOSTERONE: CPT

## 2021-07-10 PROCEDURE — 36415 COLL VENOUS BLD VENIPUNCTURE: CPT

## 2021-07-10 PROCEDURE — 86803 HEPATITIS C AB TEST: CPT

## 2021-07-10 PROCEDURE — G0103 PSA SCREENING: HCPCS

## 2021-07-10 PROCEDURE — 83036 HEMOGLOBIN GLYCOSYLATED A1C: CPT

## 2021-07-12 ENCOUNTER — TELEPHONE (OUTPATIENT)
Dept: FAMILY MEDICINE CLINIC | Facility: CLINIC | Age: 51
End: 2021-07-12

## 2021-07-12 LAB
HIV 1+2 AB+HIV1 P24 AG SERPL QL IA: NORMAL
TESTOST FREE SERPL-MCNC: 2.4 PG/ML (ref 7.2–24)
TESTOST SERPL-MCNC: 22 NG/DL (ref 264–916)

## 2021-07-12 NOTE — TELEPHONE ENCOUNTER
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

## 2021-07-12 NOTE — TELEPHONE ENCOUNTER
SPOKE WITH PT, STATES HE DOES NOT HAVE PAPERWORK, HE WAS TOLD HE FAILED AND THEY DID NOT GIVE HIM THE CDL PHYSICAL PAPER

## 2021-07-12 NOTE — TELEPHONE ENCOUNTER
PT STATES HE NEEDS A LETTER WRITTEN STATING THAT HIS PROBLEM IS BEING TAKEN CARE OF THEN THEY WILL RELEASE THE PAPERWORK AND HAVE PHYSICAL DONE

## 2021-07-13 DIAGNOSIS — E11.9 TYPE 2 DIABETES MELLITUS WITHOUT COMPLICATION, WITHOUT LONG-TERM CURRENT USE OF INSULIN (HCC): Primary | ICD-10-CM

## 2021-07-23 ENCOUNTER — TELEPHONE (OUTPATIENT)
Dept: FAMILY MEDICINE CLINIC | Facility: CLINIC | Age: 51
End: 2021-07-23

## 2021-07-23 DIAGNOSIS — E11.9 TYPE 2 DIABETES MELLITUS WITHOUT COMPLICATION, WITHOUT LONG-TERM CURRENT USE OF INSULIN (HCC): Primary | ICD-10-CM

## 2021-07-23 NOTE — TELEPHONE ENCOUNTER
Pt feels the metformin is making him feel very depressed, giving him a very discouraging feeling about since he started taking it & wants to know if there is anything different he can take

## 2021-07-28 ENCOUNTER — TELEPHONE (OUTPATIENT)
Dept: FAMILY MEDICINE CLINIC | Facility: CLINIC | Age: 51
End: 2021-07-28

## 2021-07-28 DIAGNOSIS — E11.9 TYPE 2 DIABETES MELLITUS WITHOUT COMPLICATION, WITHOUT LONG-TERM CURRENT USE OF INSULIN (HCC): Primary | ICD-10-CM

## 2021-07-28 RX ORDER — ALOGLIPTIN 6.25 MG/1
6.5 TABLET, FILM COATED ORAL DAILY
Qty: 30 TABLET | Refills: 5 | Status: SHIPPED | OUTPATIENT
Start: 2021-07-28 | End: 2022-02-05 | Stop reason: HOSPADM

## 2021-07-28 NOTE — TELEPHONE ENCOUNTER
Spouse called [facility], and they gave her 3 options to try for better pricing instead of expensive januvia:  OLGLYZA  ALOGLIPTIN  RADJEYGA  They told her they would be a better priced  Option isntead of 1500$ monthly for the one  He was just given for his diabetes

## 2021-07-30 ENCOUNTER — TELEPHONE (OUTPATIENT)
Dept: FAMILY MEDICINE CLINIC | Facility: CLINIC | Age: 51
End: 2021-07-30

## 2021-07-30 DIAGNOSIS — E11.9 TYPE 2 DIABETES MELLITUS WITHOUT COMPLICATION, WITHOUT LONG-TERM CURRENT USE OF INSULIN (HCC): Primary | ICD-10-CM

## 2021-07-30 RX ORDER — GLIPIZIDE 5 MG/1
5 TABLET, FILM COATED, EXTENDED RELEASE ORAL DAILY
Qty: 90 TABLET | Refills: 3 | Status: SHIPPED | OUTPATIENT
Start: 2021-07-30 | End: 2022-06-27 | Stop reason: SDUPTHER

## 2021-07-30 NOTE — TELEPHONE ENCOUNTER
pts mother called, states pts insurance will only cover glipizide-metformin, pioglitazone, glipizide or metformin for diabetes  States pt needs something new called to pharm for sugars as they are elevated due to pt not having medication

## 2021-10-27 ENCOUNTER — VBI (OUTPATIENT)
Dept: ADMINISTRATIVE | Facility: OTHER | Age: 51
End: 2021-10-27

## 2021-11-07 NOTE — PROGRESS NOTES
[facility]hy[facility] 75  coding opportunities          Chart reviewed, no opportunity found: CHART REVIEWED, NO OPPORTUNITY FOUND              Patients insurance company: [employer] (Medicare Advantage and Commercial)
Patient

## 2022-01-14 ENCOUNTER — TELEMEDICINE (OUTPATIENT)
Dept: FAMILY MEDICINE CLINIC | Facility: CLINIC | Age: 52
End: 2022-01-14
Payer: COMMERCIAL

## 2022-01-14 DIAGNOSIS — I10 ESSENTIAL HYPERTENSION: ICD-10-CM

## 2022-01-14 DIAGNOSIS — Z12.12 SCREENING FOR COLORECTAL CANCER: ICD-10-CM

## 2022-01-14 DIAGNOSIS — E78.5 HYPERLIPIDEMIA, UNSPECIFIED HYPERLIPIDEMIA TYPE: ICD-10-CM

## 2022-01-14 DIAGNOSIS — Z12.11 SCREENING FOR COLORECTAL CANCER: ICD-10-CM

## 2022-01-14 DIAGNOSIS — E11.9 TYPE 2 DIABETES MELLITUS WITHOUT COMPLICATION, WITHOUT LONG-TERM CURRENT USE OF INSULIN (HCC): ICD-10-CM

## 2022-01-14 DIAGNOSIS — R05.9 COUGH: ICD-10-CM

## 2022-01-14 DIAGNOSIS — Z23 ENCOUNTER FOR IMMUNIZATION: ICD-10-CM

## 2022-01-14 DIAGNOSIS — J32.9 SINUSITIS, UNSPECIFIED CHRONICITY, UNSPECIFIED LOCATION: Primary | ICD-10-CM

## 2022-01-14 PROCEDURE — 99214 OFFICE O/P EST MOD 30 MIN: CPT | Performed by: FAMILY MEDICINE

## 2022-01-14 RX ORDER — BENZONATATE 100 MG/1
100 CAPSULE ORAL 3 TIMES DAILY PRN
Qty: 20 CAPSULE | Refills: 0 | Status: SHIPPED | OUTPATIENT
Start: 2022-01-14 | End: 2022-02-05 | Stop reason: HOSPADM

## 2022-01-14 RX ORDER — AZITHROMYCIN 250 MG/1
TABLET, FILM COATED ORAL
Qty: 6 TABLET | Refills: 0 | Status: SHIPPED | OUTPATIENT
Start: 2022-01-14 | End: 2022-01-18

## 2022-01-14 NOTE — PROGRESS NOTES
Virtual Regular Visit  It was my intent to perform this visit via video technology but the patient was not able to do a video connection so the visit was completed via audio telephone only    Verification of patient location:    Patient is located in the following state in which I hold an active license [place]      Assessment/Plan:    Problem List Items Addressed This Visit        Other    Hyperlipidemia    Relevant Orders    Lipid panel    Comprehensive metabolic panel    TSH, 3rd generation    HEMOGLOBIN A1C W/ EAG ESTIMATION      Other Visit Diagnoses     Sinusitis, unspecified chronicity, unspecified location    -  Primary    Relevant Medications    azithromycin (ZITHROMAX) 250 mg tablet    Other Relevant Orders    Lipid panel    Comprehensive metabolic panel    TSH, 3rd generation    HEMOGLOBIN A1C W/ EAG ESTIMATION    Encounter for immunization        Relevant Orders    Lipid panel    Comprehensive metabolic panel    TSH, 3rd generation    HEMOGLOBIN A1C W/ EAG ESTIMATION    Screening for colorectal cancer        Relevant Orders    Cologuard    Lipid panel    Comprehensive metabolic panel    TSH, 3rd generation    HEMOGLOBIN A1C W/ EAG ESTIMATION    Cough        Relevant Medications    benzonatate (TESSALON PERLES) 100 mg capsule    Other Relevant Orders    Lipid panel    Comprehensive metabolic panel    TSH, 3rd generation    HEMOGLOBIN A1C W/ EAG ESTIMATION    Type 2 diabetes mellitus without complication, without long-term current use of insulin (HCC)        Relevant Orders    Lipid panel    Comprehensive metabolic panel    TSH, 3rd generation    HEMOGLOBIN A1C W/ EAG ESTIMATION    Essential hypertension        Relevant Orders    Lipid panel    Comprehensive metabolic panel    TSH, 3rd generation    HEMOGLOBIN A1C W/ EAG ESTIMATION      I offered him a COVID-19 test   He states he has been quarantined for 3 days and will not go anywhere for another 2 days and does not want a test          Reason for visit is No chief complaint on file  Encounter provider Denita Huynh MD    Provider located at [address] [place] [address] [address]      Recent Visits  No visits were found meeting these conditions  Showing recent visits within past 7 days and meeting all other requirements  Today's Visits  Date Type Provider Dept   01/14/22 Telemedicine Denita Huynh MD  [facility]    Showing today's visits and meeting all other requirements  Future Appointments  No visits were found meeting these conditions  Showing future appointments within next 150 days and meeting all other requirements       The patient was identified by name and date of birth  Apple Mckenzie was informed that this is a telemedicine visit and that the visit is being conducted through Telephone  My office door was closed  No one else was in the room  He acknowledged consent and understanding of privacy and security of the video platform  The patient has agreed to participate and understands they can discontinue the visit at any time  Patient is aware this is a billable service  Deneice Lanes is a 46 y o  male    He has been sick for 3 days with cough, congestion, and HA  His mom and dad have the flu  He has not been vaccinated for COVID  He has no fever  He has no loss of taste or smell  He has no fatigue  He is feeling better today than yesterday           Past Medical History:   Diagnosis Date    Hypertension     Renal calculi     Last assessed: 1/11/18       Past Surgical History:   Procedure Laterality Date    CYSTOSCOPY W/ URETERAL STENT PLACEMENT  02/04/2014    CYSTOSCOPY W/ URETERAL STENT REMOVAL  02/11/2014    w/urteroscopy w/removal of calculus    KNEE SURGERY Right     STONE ANALYSIS (HISTORICAL)      TONSILECTOMY AND ADNOIDECTOMY         Current Outpatient Medications   Medication Sig Dispense Refill    Alogliptin Benzoate 6 25 MG TABS Take 1 04 tablets (6 5 mg total) by mouth daily 30 tablet 5    azithromycin (ZITHROMAX) 250 mg tablet Take 2 tablets today then 1 tablet daily x 4 days 6 tablet 0    benzonatate (TESSALON PERLES) 100 mg capsule Take 1 capsule (100 mg total) by mouth 3 (three) times a day as needed for cough 20 capsule 0    glipiZIDE (GLUCOTROL XL) 5 mg 24 hr tablet Take 1 tablet (5 mg total) by mouth daily 90 tablet 3    ibuprofen (MOTRIN) 800 mg tablet Take 1 tablet by mouth every 6 (six) hours as needed (p r n  pain) 30 tablet 0    losartan (COZAAR) 25 mg tablet Take 1 tablet (25 mg total) by mouth daily 90 tablet 3    Syringe, Disposable, (2-3CC SYRINGE) 3 ML MISC Use every 21 days 25 each 0    Testosterone Cypionate 200 MG/ML SOLN Inject 200 mg as directed every 21 days 5 mL 5     No current facility-administered medications for this visit  Allergies   Allergen Reactions    Penicillins GI Intolerance       Review of Systems    Video Exam    There were no vitals filed for this visit  Physical Exam     I spent 15 minutes directly with the patient during this visit    [address] Mil[address] Singh verbally agrees to participate in [employer]  Pt is aware that [employer] could be limited without vital signs or the ability to perform a full hands-on physical exam  Pedro Pablo Augustin understands he or the provider may request at any time to terminate the video visit and request the patient to seek care or treatment in person

## 2022-01-27 ENCOUNTER — VBI (OUTPATIENT)
Dept: ADMINISTRATIVE | Facility: OTHER | Age: 52
End: 2022-01-27

## 2022-02-04 ENCOUNTER — HOSPITAL ENCOUNTER (OUTPATIENT)
Facility: HOSPITAL | Age: 52
Setting detail: OBSERVATION
Discharge: HOME | End: 2022-02-05
Attending: EMERGENCY MEDICINE | Admitting: INTERNAL MEDICINE
Payer: COMMERCIAL

## 2022-02-04 ENCOUNTER — APPOINTMENT (EMERGENCY)
Dept: CT IMAGING | Facility: HOSPITAL | Age: 52
End: 2022-02-04
Payer: COMMERCIAL

## 2022-02-04 ENCOUNTER — OFFICE VISIT (OUTPATIENT)
Dept: URGENT CARE | Facility: CLINIC | Age: 52
End: 2022-02-04
Payer: COMMERCIAL

## 2022-02-04 VITALS
DIASTOLIC BLOOD PRESSURE: 76 MMHG | TEMPERATURE: 97.4 F | HEART RATE: 92 BPM | OXYGEN SATURATION: 97 % | SYSTOLIC BLOOD PRESSURE: 110 MMHG

## 2022-02-04 DIAGNOSIS — R10.13 EPIGASTRIC PAIN: Primary | ICD-10-CM

## 2022-02-04 DIAGNOSIS — K55.069 SUPERIOR MESENTERIC VEIN THROMBOSIS (HCC): Primary | ICD-10-CM

## 2022-02-04 PROBLEM — E11.69 DIABETES MELLITUS TYPE 2 IN OBESE (HCC): Status: ACTIVE | Noted: 2022-02-04

## 2022-02-04 PROBLEM — E66.9 DIABETES MELLITUS TYPE 2 IN OBESE (HCC): Status: ACTIVE | Noted: 2022-02-04

## 2022-02-04 LAB
ALBUMIN SERPL BCP-MCNC: 4.5 G/DL (ref 3.5–5)
ALP SERPL-CCNC: 65 U/L (ref 34–104)
ALT SERPL W P-5'-P-CCNC: 20 U/L (ref 7–52)
ANION GAP SERPL CALCULATED.3IONS-SCNC: 9 MMOL/L (ref 4–13)
APTT PPP: 32 SECONDS (ref 23–37)
APTT PPP: 76 SECONDS (ref 23–37)
AST SERPL W P-5'-P-CCNC: 13 U/L (ref 13–39)
BASOPHILS # BLD AUTO: 0.04 THOUSANDS/ΜL (ref 0–0.1)
BASOPHILS NFR BLD AUTO: 1 % (ref 0–1)
BILIRUB SERPL-MCNC: 1.86 MG/DL (ref 0.2–1)
BILIRUB UR QL STRIP: NEGATIVE
BUN SERPL-MCNC: 16 MG/DL (ref 5–25)
CALCIUM SERPL-MCNC: 10.1 MG/DL (ref 8.4–10.2)
CHLORIDE SERPL-SCNC: 102 MMOL/L (ref 96–108)
CLARITY UR: CLEAR
CO2 SERPL-SCNC: 27 MMOL/L (ref 21–32)
COLOR UR: YELLOW
CREAT SERPL-MCNC: 1.01 MG/DL (ref 0.6–1.3)
EOSINOPHIL # BLD AUTO: 0.17 THOUSAND/ΜL (ref 0–0.61)
EOSINOPHIL NFR BLD AUTO: 2 % (ref 0–6)
ERYTHROCYTE [DISTWIDTH] IN BLOOD BY AUTOMATED COUNT: 12.9 % (ref 11.6–15.1)
FLUAV RNA RESP QL NAA+PROBE: NEGATIVE
FLUBV RNA RESP QL NAA+PROBE: NEGATIVE
GFR SERPL CREATININE-BSD FRML MDRD: 85 ML/MIN/1.73SQ M
GLUCOSE SERPL-MCNC: 114 MG/DL (ref 65–140)
GLUCOSE SERPL-MCNC: 141 MG/DL (ref 65–140)
GLUCOSE SERPL-MCNC: 89 MG/DL (ref 65–140)
GLUCOSE UR STRIP-MCNC: NEGATIVE MG/DL
HCT VFR BLD AUTO: 44.2 % (ref 36.5–49.3)
HGB BLD-MCNC: 14.7 G/DL (ref 12–17)
HGB UR QL STRIP.AUTO: NEGATIVE
IMM GRANULOCYTES # BLD AUTO: 0.02 THOUSAND/UL (ref 0–0.2)
IMM GRANULOCYTES NFR BLD AUTO: 0 % (ref 0–2)
INR PPP: 1.09 (ref 0.84–1.19)
KETONES UR STRIP-MCNC: NEGATIVE MG/DL
LEUKOCYTE ESTERASE UR QL STRIP: NEGATIVE
LIPASE SERPL-CCNC: 52 U/L (ref 11–82)
LYMPHOCYTES # BLD AUTO: 2.64 THOUSANDS/ΜL (ref 0.6–4.47)
LYMPHOCYTES NFR BLD AUTO: 33 % (ref 14–44)
MCH RBC QN AUTO: 28.9 PG (ref 26.8–34.3)
MCHC RBC AUTO-ENTMCNC: 33.3 G/DL (ref 31.4–37.4)
MCV RBC AUTO: 87 FL (ref 82–98)
MONOCYTES # BLD AUTO: 0.52 THOUSAND/ΜL (ref 0.17–1.22)
MONOCYTES NFR BLD AUTO: 7 % (ref 4–12)
NEUTROPHILS # BLD AUTO: 4.65 THOUSANDS/ΜL (ref 1.85–7.62)
NEUTS SEG NFR BLD AUTO: 57 % (ref 43–75)
NITRITE UR QL STRIP: NEGATIVE
NRBC BLD AUTO-RTO: 0 /100 WBCS
PH UR STRIP.AUTO: 5.5 [PH]
PLATELET # BLD AUTO: 194 THOUSANDS/UL (ref 149–390)
PMV BLD AUTO: 10.3 FL (ref 8.9–12.7)
POTASSIUM SERPL-SCNC: 4 MMOL/L (ref 3.5–5.3)
PROT SERPL-MCNC: 8.4 G/DL (ref 6.4–8.4)
PROT UR STRIP-MCNC: NEGATIVE MG/DL
PROTHROMBIN TIME: 14 SECONDS (ref 11.6–14.5)
RBC # BLD AUTO: 5.09 MILLION/UL (ref 3.88–5.62)
RSV RNA RESP QL NAA+PROBE: NEGATIVE
SARS-COV-2 RNA RESP QL NAA+PROBE: NEGATIVE
SODIUM SERPL-SCNC: 138 MMOL/L (ref 135–147)
SP GR UR STRIP.AUTO: 1.01 (ref 1–1.03)
UROBILINOGEN UR QL STRIP.AUTO: 0.2 E.U./DL
WBC # BLD AUTO: 8.04 THOUSAND/UL (ref 4.31–10.16)

## 2022-02-04 PROCEDURE — 80053 COMPREHEN METABOLIC PANEL: CPT | Performed by: PHYSICIAN ASSISTANT

## 2022-02-04 PROCEDURE — 36415 COLL VENOUS BLD VENIPUNCTURE: CPT | Performed by: PHYSICIAN ASSISTANT

## 2022-02-04 PROCEDURE — 96374 THER/PROPH/DIAG INJ IV PUSH: CPT

## 2022-02-04 PROCEDURE — 85610 PROTHROMBIN TIME: CPT | Performed by: PHYSICIAN ASSISTANT

## 2022-02-04 PROCEDURE — 74177 CT ABD & PELVIS W/CONTRAST: CPT

## 2022-02-04 PROCEDURE — 85025 COMPLETE CBC W/AUTO DIFF WBC: CPT | Performed by: PHYSICIAN ASSISTANT

## 2022-02-04 PROCEDURE — 99285 EMERGENCY DEPT VISIT HI MDM: CPT | Performed by: PHYSICIAN ASSISTANT

## 2022-02-04 PROCEDURE — 99285 EMERGENCY DEPT VISIT HI MDM: CPT

## 2022-02-04 PROCEDURE — 99220 PR INITIAL OBSERVATION CARE/DAY 70 MINUTES: CPT | Performed by: INTERNAL MEDICINE

## 2022-02-04 PROCEDURE — 85730 THROMBOPLASTIN TIME PARTIAL: CPT

## 2022-02-04 PROCEDURE — 96361 HYDRATE IV INFUSION ADD-ON: CPT

## 2022-02-04 PROCEDURE — 83690 ASSAY OF LIPASE: CPT | Performed by: PHYSICIAN ASSISTANT

## 2022-02-04 PROCEDURE — 0241U HB NFCT DS VIR RESP RNA 4 TRGT: CPT | Performed by: PHYSICIAN ASSISTANT

## 2022-02-04 PROCEDURE — 99213 OFFICE O/P EST LOW 20 MIN: CPT

## 2022-02-04 PROCEDURE — 85730 THROMBOPLASTIN TIME PARTIAL: CPT | Performed by: PHYSICIAN ASSISTANT

## 2022-02-04 PROCEDURE — 93005 ELECTROCARDIOGRAM TRACING: CPT

## 2022-02-04 PROCEDURE — 82948 REAGENT STRIP/BLOOD GLUCOSE: CPT

## 2022-02-04 PROCEDURE — 96375 TX/PRO/DX INJ NEW DRUG ADDON: CPT

## 2022-02-04 PROCEDURE — 81003 URINALYSIS AUTO W/O SCOPE: CPT | Performed by: INTERNAL MEDICINE

## 2022-02-04 RX ORDER — SODIUM CHLORIDE, SODIUM GLUCONATE, SODIUM ACETATE, POTASSIUM CHLORIDE, MAGNESIUM CHLORIDE, SODIUM PHOSPHATE, DIBASIC, AND POTASSIUM PHOSPHATE .53; .5; .37; .037; .03; .012; .00082 G/100ML; G/100ML; G/100ML; G/100ML; G/100ML; G/100ML; G/100ML
75 INJECTION, SOLUTION INTRAVENOUS ONCE
Status: COMPLETED | OUTPATIENT
Start: 2022-02-04 | End: 2022-02-04

## 2022-02-04 RX ORDER — KETOROLAC TROMETHAMINE 30 MG/ML
15 INJECTION, SOLUTION INTRAMUSCULAR; INTRAVENOUS ONCE
Status: COMPLETED | OUTPATIENT
Start: 2022-02-04 | End: 2022-02-04

## 2022-02-04 RX ORDER — MAGNESIUM HYDROXIDE/ALUMINUM HYDROXICE/SIMETHICONE 120; 1200; 1200 MG/30ML; MG/30ML; MG/30ML
30 SUSPENSION ORAL EVERY 4 HOURS PRN
Status: SHIPPED | OUTPATIENT
Start: 2022-02-04

## 2022-02-04 RX ORDER — ONDANSETRON 2 MG/ML
4 INJECTION INTRAMUSCULAR; INTRAVENOUS EVERY 6 HOURS PRN
Status: DISCONTINUED | OUTPATIENT
Start: 2022-02-04 | End: 2022-02-05 | Stop reason: HOSPADM

## 2022-02-04 RX ORDER — LIDOCAINE HYDROCHLORIDE 20 MG/ML
15 SOLUTION OROPHARYNGEAL 4 TIMES DAILY PRN
Status: SHIPPED | OUTPATIENT
Start: 2022-02-04

## 2022-02-04 RX ORDER — LOSARTAN POTASSIUM 25 MG/1
25 TABLET ORAL DAILY
Status: DISCONTINUED | OUTPATIENT
Start: 2022-02-05 | End: 2022-02-05 | Stop reason: HOSPADM

## 2022-02-04 RX ORDER — OXYCODONE HYDROCHLORIDE 5 MG/1
5 TABLET ORAL EVERY 6 HOURS PRN
Status: DISCONTINUED | OUTPATIENT
Start: 2022-02-04 | End: 2022-02-05 | Stop reason: HOSPADM

## 2022-02-04 RX ORDER — ACETAMINOPHEN 325 MG/1
650 TABLET ORAL EVERY 6 HOURS PRN
Status: DISCONTINUED | OUTPATIENT
Start: 2022-02-04 | End: 2022-02-05 | Stop reason: HOSPADM

## 2022-02-04 RX ORDER — PANTOPRAZOLE SODIUM 40 MG/1
40 TABLET, DELAYED RELEASE ORAL DAILY
Qty: 14 TABLET | Refills: 0 | Status: SHIPPED | OUTPATIENT
Start: 2022-02-04 | End: 2022-02-05 | Stop reason: HOSPADM

## 2022-02-04 RX ADMIN — HEPARIN SODIUM 18 UNITS/KG/HR: 10000 INJECTION, SOLUTION INTRAVENOUS; SUBCUTANEOUS at 16:40

## 2022-02-04 RX ADMIN — KETOROLAC TROMETHAMINE 15 MG: 30 INJECTION, SOLUTION INTRAMUSCULAR at 14:54

## 2022-02-04 RX ADMIN — FAMOTIDINE 20 MG: 10 INJECTION INTRAVENOUS at 14:54

## 2022-02-04 RX ADMIN — SODIUM CHLORIDE 1000 ML: 0.9 INJECTION, SOLUTION INTRAVENOUS at 14:54

## 2022-02-04 RX ADMIN — SODIUM CHLORIDE, SODIUM GLUCONATE, SODIUM ACETATE, POTASSIUM CHLORIDE, MAGNESIUM CHLORIDE, SODIUM PHOSPHATE, DIBASIC, AND POTASSIUM PHOSPHATE 75 ML/HR: .53; .5; .37; .037; .03; .012; .00082 INJECTION, SOLUTION INTRAVENOUS at 18:09

## 2022-02-04 RX ADMIN — IOHEXOL 100 ML: 350 INJECTION, SOLUTION INTRAVENOUS at 15:20

## 2022-02-04 NOTE — PLAN OF CARE
Problem: Potential for Falls  Goal: Patient will remain free of falls  Description: INTERVENTIONS:  - Educate patient/family on patient safety including physical limitations  - Instruct patient to call for assistance with activity   - Consult OT/PT to assist with strengthening/mobility   - Keep Call bell within reach  - Keep bed low and locked with side rails adjusted as appropriate  - Keep care items and personal belongings within reach  - Initiate and maintain comfort rounds  - Make Fall Risk Sign visible to staff  - Offer Toileting every  Hours, in advance of need  - Initiate/Maintain alarm  - Obtain necessary fall risk management equipment:   - Apply yellow socks and bracelet for high fall risk patients  - Consider moving patient to room near nurses station  Outcome: Progressing     Problem: PAIN - ADULT  Goal: Verbalizes/displays adequate comfort level or baseline comfort level  Description: Interventions:  - Encourage patient to monitor pain and request assistance  - Assess pain using appropriate pain scale  - Administer analgesics based on type and severity of pain and evaluate response  - Implement non-pharmacological measures as appropriate and evaluate response  - Consider cultural and social influences on pain and pain management  - Notify physician/advanced practitioner if interventions unsuccessful or patient reports new pain  Outcome: Progressing     Problem: INFECTION - ADULT  Goal: Absence or prevention of progression during hospitalization  Description: INTERVENTIONS:  - Assess and monitor for signs and symptoms of infection  - Monitor lab/diagnostic results  - Monitor all insertion sites, i e  indwelling lines, tubes, and drains  - Monitor endotracheal if appropriate and nasal secretions for changes in amount and color  - [place] appropriate cooling/warming therapies per order  - Administer medications as ordered  - Instruct and encourage patient and family to use good hand hygiene technique  - Identify and instruct in appropriate isolation precautions for identified infection/condition  Outcome: Progressing  Goal: Absence of fever/infection during neutropenic period  Description: INTERVENTIONS:  - Monitor WBC    Outcome: Progressing     Problem: SAFETY ADULT  Goal: Maintain or return to baseline ADL function  Description: INTERVENTIONS:  -  Assess patient's ability to carry out ADLs; assess patient's baseline for ADL function and identify physical deficits which impact ability to perform ADLs (bathing, care of mouth/teeth, toileting, grooming, dressing, etc )  - Assess/evaluate cause of self-care deficits   - Assess range of motion  - Assess patient's mobility; develop plan if impaired  - Assess patient's need for assistive devices and provide as appropriate  - Encourage maximum independence but intervene and supervise when necessary  - Involve family in performance of ADLs  - Assess for home care needs following discharge   - Consider OT consult to assist with ADL evaluation and planning for discharge  - Provide patient education as appropriate  Outcome: Progressing  Goal: Maintains/Returns to pre admission functional level  Description: INTERVENTIONS:  - Perform BMAT or MOVE assessment daily    - Set and communicate daily mobility goal to care team and patient/family/caregiver  - Collaborate with rehabilitation services on mobility goals if consulted  - Perform Range of Motion  times a day  - Reposition patient every  hours    - Dangle patient  times a day  - Stand patient  times a day  - Ambulate patient  times a day  - Out of bed to chair  times a day   - Out of bed for meal times a day  - Out of bed for toileting  - Record patient progress and toleration of activity level   Outcome: Progressing     Problem: DISCHARGE PLANNING  Goal: Discharge to home or other facility with appropriate resources  Description: INTERVENTIONS:  - Identify barriers to discharge w/patient and caregiver  - Arrange for needed discharge resources and transportation as appropriate  - Identify discharge learning needs (meds, wound care, etc )  - Arrange for interpretive services to assist at discharge as needed  - Refer to Case Management Department for coordinating discharge planning if the patient needs post-hospital services based on physician/advanced practitioner order or complex needs related to functional status, cognitive ability, or social support system  Outcome: Progressing     Problem: Knowledge Deficit  Goal: Patient/family/caregiver demonstrates understanding of disease process, treatment plan, medications, and discharge instructions  Description: Complete learning assessment and assess knowledge base    Interventions:  - Provide teaching at level of understanding  - Provide teaching via preferred learning methods  Outcome: Progressing

## 2022-02-04 NOTE — ED PROVIDER NOTES
History  Chief Complaint   Patient presents with    Abdominal Pain     upper abdominal eleni     51-year-old male history of hypertension, diabetes presents complaining of abdominal pain  Patient reports his pain has been present for the past 4-5 days  Went to urgent care where he believes he received viscous lidocaine with no relief of symptoms  He describes it as a churning, burning, gnawing sensation predominantly middle of his abdomen, epigastric but also has some left lower quadrant pain  Pain is intermittent, worse with movement, improved while lying on his left side without radiation  He is asymptomatic at this time  Denies any fevers, chills but does report a cough  Reports he was ill approximately 3 weeks ago  Denies any known COVID contacts  Denies any chest pain, shortness of breath, vomiting, diarrhea, melena hematochezia, urinary symptoms, lower leg pain or swelling or any other complaints or concerns at this time  Prior to Admission Medications   Prescriptions Last Dose Informant Patient Reported? Taking?    Alogliptin Benzoate 6 25 MG TABS   No No   Sig: Take 1 04 tablets (6 5 mg total) by mouth daily   Patient not taking: Reported on 2/4/2022    Syringe, Disposable, (2-3CC SYRINGE) 3 ML MISC   No No   Sig: Use every 21 days   Testosterone Cypionate 200 MG/ML SOLN   No No   Sig: Inject 200 mg as directed every 21 days   Patient not taking: Reported on 2/4/2022    benzonatate (TESSALON PERLES) 100 mg capsule   No No   Sig: Take 1 capsule (100 mg total) by mouth 3 (three) times a day as needed for cough   Patient not taking: Reported on 2/4/2022    glipiZIDE (GLUCOTROL XL) 5 mg 24 hr tablet   No No   Sig: Take 1 tablet (5 mg total) by mouth daily   ibuprofen (MOTRIN) 800 mg tablet  Self No No   Sig: Take 1 tablet by mouth every 6 (six) hours as needed (p r n  pain)   Patient not taking: Reported on 2/4/2022    losartan (COZAAR) 25 mg tablet   No No   Sig: Take 1 tablet (25 mg total) by mouth daily   pantoprazole (PROTONIX) 40 mg tablet   No No   Sig: Take 1 tablet (40 mg total) by mouth daily for 14 days      Facility-Administered Medications Last Administration Doses Remaining   Lidocaine Viscous HCl (XYLOCAINE) 2 % mucosal solution 15 mL None recorded    aluminum-magnesium hydroxide-simethicone (MYLANTA) oral suspension 30 mL None recorded           Past Medical History:   Diagnosis Date    Hypertension     Renal calculi     Last assessed: 1/11/18       Past Surgical History:   Procedure Laterality Date    CYSTOSCOPY W/ URETERAL STENT PLACEMENT  02/04/2014    CYSTOSCOPY W/ URETERAL STENT REMOVAL  02/11/2014    w/urteroscopy w/removal of calculus    KNEE SURGERY Right     STONE ANALYSIS (HISTORICAL)      TONSILECTOMY AND ADNOIDECTOMY         Family History   Problem Relation Age of Onset    Diabetes Mother         Mellitus    Hypertension Mother     Stroke Mother         Syndrome    Hypertension Father      I have reviewed and agree with the history as documented  E-Cigarette/Vaping     E-Cigarette/Vaping Substances     Social History     Tobacco Use    Smoking status: Never Smoker    Smokeless tobacco: Current User   Substance Use Topics    Alcohol use: No     Comment: Per [employer]: Occasional alcohol use    Drug use: No       Review of Systems   Constitutional: Negative for chills, fatigue and fever  HENT: Negative for congestion and sore throat  Eyes: Negative for pain  Respiratory: Negative for cough, chest tightness, shortness of breath and wheezing  Cardiovascular: Negative for chest pain, palpitations and leg swelling  Gastrointestinal: Positive for abdominal pain  Negative for constipation, diarrhea, nausea and vomiting  Endocrine: Negative for polyuria  Genitourinary: Negative for dysuria  Musculoskeletal: Negative for arthralgias, back pain, myalgias and neck pain  Skin: Negative for rash     Neurological: Negative for dizziness, syncope, light-headedness and headaches  All other systems reviewed and are negative  Physical Exam  Physical Exam  Vitals reviewed  Constitutional:       Appearance: Normal appearance  He is well-developed  HENT:      Head: Normocephalic and atraumatic  Mouth/Throat:      Mouth: Mucous membranes are moist    Eyes:      Conjunctiva/sclera: Conjunctivae normal    Cardiovascular:      Rate and Rhythm: Normal rate and regular rhythm  Heart sounds: Normal heart sounds  Pulmonary:      Effort: Pulmonary effort is normal       Breath sounds: Normal breath sounds  Abdominal:      General: Bowel sounds are normal       Palpations: Abdomen is soft  Tenderness: There is generalized abdominal tenderness  Musculoskeletal:         General: Normal range of motion  Cervical back: Normal range of motion  Skin:     General: Skin is warm and dry  Capillary Refill: Capillary refill takes less than 2 seconds  Neurological:      General: No focal deficit present  Mental Status: He is alert and oriented to person, place, and time     Psychiatric:         Mood and Affect: Mood normal          Behavior: Behavior normal          Vital Signs  ED Triage Vitals [02/04/22 1352]   Temperature Pulse Respirations Blood Pressure SpO2   97 5 °F (36 4 °C) 88 20 160/78 98 %      Temp Source Heart Rate Source Patient Position - Orthostatic VS BP Location FiO2 (%)   Temporal Monitor Sitting Right arm --      Pain Score       2           Vitals:    02/04/22 1352   BP: 160/78   Pulse: 88   Patient Position - Orthostatic VS: Sitting         Visual Acuity      ED Medications  Medications   heparin (porcine) 25,000 units in 0 45% in sodium chloride 250 ml infusion (CMPD) (has no administration in time range)   famotidine (PEPCID) injection 20 mg (20 mg Intravenous Given 2/4/22 1454)   ketorolac (TORADOL) injection 15 mg (15 mg Intravenous Given 2/4/22 1454)   sodium chloride 0 9 % bolus 1,000 mL (1,000 mL Intravenous New Bag 2/4/22 1454)   iohexol (OMNIPAQUE) 350 MG/ML injection (SINGLE-DOSE) 100 mL (100 mL Intravenous Given 2/4/22 1520)       Diagnostic Studies  Results Reviewed     Procedure Component Value Units Date/Time    APTT [[id number]]  (Normal) Collected: 02/04/22 1600    Lab Status: Final result Specimen: Blood from Arm, Left Updated: 02/04/22 1622     PTT 32 seconds     Protime-INR [[id number]]  (Normal) Collected: 02/04/22 1600    Lab Status: Final result Specimen: Blood from Arm, Left Updated: 02/04/22 1622     Protime 14 0 seconds      INR 1 09    COVID/FLU/RSV - 2 hour TAT [[id number]]  (Normal) Collected: 02/04/22 1501    Lab Status: Final result Specimen: Nasopharyngeal Swab Updated: 02/04/22 1554     SARS-CoV-2 Negative     INFLUENZA A PCR Negative     INFLUENZA B PCR Negative     RSV PCR Negative    Narrative:      FOR PEDIATRIC PATIENTS - copy/paste COVID Guidelines URL to browser: [email or web address]  [employer]    SARS-CoV-2 assay is a Nucleic Acid Amplification assay intended for the  qualitative detection of nucleic acid from SARS-CoV-2 in nasopharyngeal  swabs  Results are for the presumptive identification of SARS-CoV-2 RNA  Positive results are indicative of infection with SARS-CoV-2, the virus  causing COVID-19, but do not rule out bacterial infection or co-infection  with other viruses  Laboratories within the [place] and its  territories are required to report all positive results to the appropriate  public health authorities  Negative results do not preclude SARS-CoV-2  infection and should not be used as the sole basis for treatment or other  patient management decisions  Negative results must be combined with  clinical observations, patient history, and epidemiological information  This test has not been FDA cleared or approved  This test has been authorized by FDA under an Emergency Use Authorization  (EUA)   This test is only authorized for the duration of time the  declaration that circumstances exist justifying the authorization of the  emergency use of an in vitro diagnostic tests for detection of SARS-CoV-2  virus and/or diagnosis of COVID-19 infection under section 564(b)(1) of  the Act, 21 U  S C  068EBN-1(R)(5), unless the authorization is terminated  or revoked sooner  The test has been validated but independent review by FDA  and CLIA is pending  Test performed using [employer]: This RT-PCR assay targets N2,  a region unique to SARS-CoV-2  A conserved region in the E-gene was chosen  for pan-Sarbecovirus detection which includes SARS-CoV-2      CMP [[id number]]  (Abnormal) Collected: 02/04/22 1435    Lab Status: Final result Specimen: Blood from Arm, Left Updated: 02/04/22 1503     Sodium 138 mmol/L      Potassium 4 0 mmol/L      Chloride 102 mmol/L      CO2 27 mmol/L      ANION GAP 9 mmol/L      BUN 16 mg/dL      Creatinine 1 01 mg/dL      Glucose 141 mg/dL      Calcium 10 1 mg/dL      AST 13 U/L      ALT 20 U/L      Alkaline Phosphatase 65 U/L      Total Protein 8 4 g/dL      Albumin 4 5 g/dL      Total Bilirubin 1 86 mg/dL      eGFR 85 ml/min/1 73sq m     Narrative:      [place] guidelines for Chronic Kidney Disease (CKD):     Stage 1 with normal or high GFR (GFR > 90 mL/min/1 73 square meters)    Stage 2 Mild CKD (GFR = 60-89 mL/min/1 73 square meters)    Stage 3A Moderate CKD (GFR = 45-59 mL/min/1 73 square meters)    Stage 3B Moderate CKD (GFR = 30-44 mL/min/1 73 square meters)    Stage 4 Severe CKD (GFR = 15-29 mL/min/1 73 square meters)    Stage 5 End Stage CKD (GFR <15 mL/min/1 73 square meters)  Note: GFR calculation is accurate only with a steady state creatinine    Lipase [[id number]]  (Normal) Collected: 02/04/22 1435    Lab Status: Final result Specimen: Blood from Arm, Left Updated: 02/04/22 1503     Lipase 52 u/L     CBC and differential [[id number]] Collected: 02/04/22 1435    Lab Status: Final result Specimen: Blood from Arm, Left Updated: 02/04/22 1441     WBC 8 04 Thousand/uL      RBC 5 09 Million/uL      Hemoglobin 14 7 g/dL      Hematocrit 44 2 %      MCV 87 fL      MCH 28 9 pg      MCHC 33 3 g/dL      RDW 12 9 %      MPV 10 3 fL      Platelets 705 Thousands/uL      nRBC 0 /100 WBCs      Neutrophils Relative 57 %      Immat GRANS % 0 %      Lymphocytes Relative 33 %      Monocytes Relative 7 %      Eosinophils Relative 2 %      Basophils Relative 1 %      Neutrophils Absolute 4 65 Thousands/µL      Immature Grans Absolute 0 02 Thousand/uL      Lymphocytes Absolute 2 64 Thousands/µL      Monocytes Absolute 0 52 Thousand/µL      Eosinophils Absolute 0 17 Thousand/µL      Basophils Absolute 0 04 Thousands/µL     UA w Reflex to Microscopic w Reflex to Culture [[id number]]     Lab Status: No result Specimen: Urine                  CT abdomen pelvis with contrast   Final Result by Neftali Mccain MD (02/04 1534)      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, thrombus extends centrally to but not into the portal confluence  No portal vein or splenic vein thrombus  No bowel wall thickening or bowel pneumatosis  I personally discussed this study with Bebo Gomez on 2/4/2022 at 3:34 PM                Workstation performed: [id number]                    Procedures  Procedures         ED Course  ED Course as of 02/04/22 1627   Fri Feb 04, 2022   1551 Discussed with Moisés Rajan of vascular surgery, recommends anticoagulation, no catheter directed or thrombolysis treatment is indicated  Recommends consulting GI as this is commonly associated to other GI process  SBIRT 22yo+      Most Recent Value   SBIRT (22 yo +)    In order to provide better care to our patients, we are screening all of our patients for alcohol and drug use  Would it be okay to ask you these screening questions?  Yes Filed at: 02/04/2022 9968 Initial Alcohol Screen: US AUDIT-C     1  How often do you have a drink containing alcohol? 0 Filed at: 02/04/2022 1438   2  How many drinks containing alcohol do you have on a typical day you are drinking? 0 Filed at: 02/04/2022 1438   3a  Male UNDER 65: How often do you have five or more drinks on one occasion? 0 Filed at: 02/04/2022 1438   3b  FEMALE Any Age, or MALE 65+: How often do you have 4 or more drinks on one occassion? 0 Filed at: 02/04/2022 1438   Audit-C Score 0 Filed at: 02/04/2022 1438   VIVIAN: How many times in the past year have you    Used an illegal drug or used a prescription medication for non-medical reasons? Never Filed at: 02/04/2022 1438                    MDM  Number of Diagnoses or Management Options  Superior mesenteric vein thrombosis ([facility] [address] )  Diagnosis management comments: Patient presented complaining of abdominal pain  CT shows SMV thrombosis  Discussed case with Manuel Salas of vascular surgery that recommends anticoagulation and pain control and GI consult  Discussed with Dr Je Marroquin of GI that will see the patient in consult tomorrow  Agrees with plan of anticoagulation pain control  Patient's pain well controlled with Toradol and Pepcid  Disposition  Final diagnoses:   Superior mesenteric vein thrombosis ([address] )     Time reflects when diagnosis was documented in both MDM as applicable and the Disposition within this note     Time User Action Codes Description Comment    2/4/2022  4:25 PM Paul, [facility] mesenteric vein thrombosis [facility])       ED Disposition     ED Disposition Condition Date/Time Comment    Admit Stable Fri Feb 4, 2022  4:18 PM Case was discussed with Doris Stewart and the patient's admission status was agreed to be Admission Status: observation status to the service of Dr Rekha Bryant          Follow-up Information    None         Patient's Medications   Discharge Prescriptions    No medications on file       No discharge procedures on file     PDMP Review     None          ED Provider  Electronically Signed by           Jennie Maloney PA-C  02/04/22 2521

## 2022-02-04 NOTE — PATIENT INSTRUCTIONS
Avoid alcohol, chocolate, citrus juice and other acidic foods  Eat several small meals a day  Wait 3 hours after a meal before lying down  Try elevating the head of the bed at night  Take the Protonix as directed for epigastric pain  Further work-up for abdominal pain is needed  Abdominal Pain   WHAT YOU NEED TO KNOW:   Abdominal pain can be dull, achy, or sharp  You may have pain in one area of your abdomen, or in your entire abdomen  Your pain may be caused by a condition such as constipation, food sensitivity or poisoning, infection, or a blockage  Abdominal pain can also be from a hernia, appendicitis, or an ulcer  Liver, gallbladder, or kidney conditions can also cause abdominal pain  The cause of your abdominal pain may not be known  DISCHARGE INSTRUCTIONS:   Call your local emergency number (911 in the [address] [facility]) if:   · You have new chest pain or shortness of breath  Return to the emergency department if:   · You have pulsing pain in your upper abdomen or lower back that suddenly becomes constant  · Your pain is in the right lower abdominal area and worsens with movement  · You have a fever over 100 4°F (38°C) or shaking chills  · You are vomiting and cannot keep food or liquids down  · Your pain does not improve or gets worse over the next 8 to 12 hours  · You see blood in your vomit or bowel movements, or they look black and tarry  · Your skin or the whites of your eyes turn yellow  · You are a woman and have a large amount of vaginal bleeding that is not your monthly period  Call your doctor if:   · You have pain in your lower back  · You are a man and have pain in your testicles  · You have pain when you urinate  · You have questions or concerns about your condition or care  Medicines:   · Prescription pain medicine  may be given  Ask your healthcare provider how to take this medicine safely  Some prescription pain medicines contain acetaminophen   Do not take other medicines that contain acetaminophen without talking to your healthcare provider  Too much acetaminophen may cause liver damage  Prescription pain medicine may cause constipation  Ask your healthcare provider how to prevent or treat constipation  · Medicines  may be given to calm your stomach or prevent vomiting  · Take your medicine as directed  Contact your healthcare provider if you think your medicine is not helping or if you have side effects  Tell him of her if you are allergic to any medicine  Keep a list of the medicines, vitamins, and herbs you take  Include the amounts, and when and why you take them  Bring the list or the pill bottles to follow-up visits  Carry your medicine list with you in case of an emergency  Manage your symptoms:   1  Apply heat  on your abdomen for 20 to 30 minutes every 2 hours for as many days as directed  Heat helps decrease pain and muscle spasms  2  Make changes to the food you eat, if needed  Do not eat foods that cause abdominal pain or other symptoms  Eat small meals more often  The following changes may also help:    ? Eat more high-fiber foods if you are constipated  High-fiber foods include fruits, vegetables, whole-grain foods, and legumes  ? Do not eat foods that cause gas if you have bloating  Examples include broccoli, cabbage, and cauliflower  Do not drink soda or carbonated drinks  These may also cause gas  ? Do not eat foods or drinks that contain sorbitol or fructose if you have diarrhea and bloating  Some examples are fruit juices, candy, jelly, and sugar-free gum  ? Do not eat high-fat foods  Examples include fried foods, cheeseburgers, hot dogs, and desserts  ? Limit or do not have caffeine  Caffeine may make symptoms such as heartburn or nausea worse  ? Drink more liquids to prevent dehydration from diarrhea or vomiting    Ask your healthcare provider how much liquid to drink each day and which liquids are best for you  3  Keep a diary of your abdominal pain  A diary may help your healthcare provider learn what is causing your abdominal pain  Include when the pain happens, how long it lasts, and what the pain feels like  Write down any other symptoms you have with abdominal pain  Also write down what you eat, and what symptoms you have after you eat  4  Manage your stress  Stress may cause abdominal pain  Your healthcare provider may recommend relaxation techniques and deep breathing exercises to help decrease your stress  Your healthcare provider may recommend you talk to someone about your stress or anxiety, such as a counselor or a trusted friend  Get plenty of sleep and exercise regularly  5  Limit or do not drink alcohol  Alcohol can make your abdominal pain worse  Ask your healthcare provider if it is safe for you to drink alcohol  Also ask how much is safe for you to drink  6  Do not smoke  Nicotine and other chemicals in cigarettes can damage your esophagus and stomach  Ask your healthcare provider for information if you currently smoke and need help to quit  E-cigarettes or smokeless tobacco still contain nicotine  Talk to your healthcare provider before you use these products  Follow up with your doctor within 24 hours or as directed:  Write down your questions so you remember to ask them during your visits  © Copyright [employer] 2021 Information is for End User's use only and may not be sold, redistributed or otherwise used for commercial purposes  All illustrations and images included in CareNotes® are the copyrighted property of [employer]  or [employer]  The above information is an  only  It is not intended as medical advice for individual conditions or treatments  Talk to your doctor, nurse or pharmacist before following any medical regimen to see if it is safe and effective for you

## 2022-02-04 NOTE — ASSESSMENT & PLAN NOTE
· Patient with abdominal pain for 1 week    CT scan of the abdomen/pelvis in the ER showed diffuse superior mesenteric vein thrombosis  · ER discussed case with GI  · Will start anticoagulation with heparin drip  · Continue pain control as needed  · IV fluids  · GI consult

## 2022-02-04 NOTE — H&P
[address] [place] [address] 1970, 46 y o  male MRN: [id number]  Unit/Bed#: -01 Encounter: [id number]  Primary Care Provider: Denita Huynh MD   Date and time admitted to hospital: 2/4/2022  2:17 PM    * Superior mesenteric vein thrombosis [facility])  Assessment & Plan  · Patient with abdominal pain for 1 week  CT scan of the abdomen/pelvis in the ER showed diffuse superior mesenteric vein thrombosis  · ER discussed case with GI  · Will start anticoagulation with heparin drip  · Continue pain control as needed  · IV fluids  · GI consult    Diabetes mellitus type 2 in obese (HCC)  Assessment & Plan  · Will hold glipizide  · Insulin sliding scale  · Diabetic diet    Morbid obesity with body mass index (BMI) of 40 0 or higher ([facility])  Assessment & Plan  · Follow-up with PCP as outpatient for possible referral to bariatrics or outpatient weight loss program    Hypertension  Assessment & Plan  · BP currently stable  · Continue losartan    VTE Prophylaxis: Heparin Drip  Code Status:  Full code  POLST: There is no POLST form on file for this patient (pre-hospital)  Discussion with family:  Offered to call family however patient states that he will update them on his own    Anticipated Length of Stay:  Patient will be admitted on an Observation basis with an anticipated length of stay of  < 2 midnights  Justification for Hospital Stay:  Valerie Janes mesenteric vein thrombosis    Chief Complaint:   Abdominal pain    History of Present Illness:    Apple Mckenzie is a 46 y o  male who presents with abdominal pain  Patient states he developed abdominal pain 1 week ago  Pain has persisted and has been a pulling type of pain located mid right side of abdomen  Pain is nonradiating  Worsened with food  No alleviating factors  Patient denies any fever or chills  Patient states that 3-4 weeks ago he had a suspected viral illness  Patient was not tested for COVID previously    COVID test in the ER was negative  Patient denies any nausea or vomiting  Patient has been tolerating diet  No diarrhea  No blood in stool  Review of Systems:  Review of Systems   Constitutional: Negative for chills and fever  Gastrointestinal: Positive for abdominal pain  Negative for abdominal distention, blood in stool and vomiting  All other systems reviewed and are negative  Past Medical and Surgical History:   Past Medical History:   Diagnosis Date    Hypertension     Renal calculi     Last assessed: 1/11/18       Past Surgical History:   Procedure Laterality Date    CYSTOSCOPY W/ URETERAL STENT PLACEMENT  02/04/2014    CYSTOSCOPY W/ URETERAL STENT REMOVAL  02/11/2014    w/urteroscopy w/removal of calculus    KNEE SURGERY Right     STONE ANALYSIS (HISTORICAL)      TONSILECTOMY AND ADNOIDECTOMY         Meds/Allergies:  Prior to Admission medications    Medication Sig Start Date End Date Taking?  Authorizing Provider   Alogliptin Benzoate 6 25 MG TABS Take 1 04 tablets (6 5 mg total) by mouth daily  Patient not taking: Reported on 2/4/2022 7/28/21   Jennifer Stanton MD   benzonatate (TESSALON PERLES) 100 mg capsule Take 1 capsule (100 mg total) by mouth 3 (three) times a day as needed for cough  Patient not taking: Reported on 2/4/2022 1/14/22   Jennifer Stanton MD   glipiZIDE (GLUCOTROL XL) 5 mg 24 hr tablet Take 1 tablet (5 mg total) by mouth daily 7/30/21   Jennifer Stanton MD   ibuprofen (MOTRIN) 800 mg tablet Take 1 tablet by mouth every 6 (six) hours as needed (p r n  pain)  Patient not taking: Reported on 2/4/2022 9/24/17   Shira Viveros MD   losartan (COZAAR) 25 mg tablet Take 1 tablet (25 mg total) by mouth daily 10/27/20   Jennifer Stanton MD   pantoprazole (PROTONIX) 40 mg tablet Take 1 tablet (40 mg total) by mouth daily for 14 days 2/4/22 2/18/22  JUAN PABLO [place]   Syringe, Disposable, (2-3CC SYRINGE) 3 ML MISC Use every 21 days 7/9/21   Jennifer Stanton MD   Testosterone Cypionate 200 MG/ML SOLN Inject 200 mg as directed every 21 days  Patient not taking: Reported on 2/4/2022 7/9/21   Denita Huynh MD     I have reviewed home medications with patient personally  Allergies: Allergies   Allergen Reactions    Penicillins GI Intolerance       Social History:  Marital Status: /Civil Union   Occupation:    Patient Pre-hospital Living Situation:  Lives with wife  Patient Pre-hospital Level of Mobility:  Ambulatory  Patient Pre-hospital Diet Restrictions:  None  Substance Use History:   Social History     Substance and Sexual Activity   Alcohol Use No    Comment: Per [employer]: Occasional alcohol use     Social History     Tobacco Use   Smoking Status Never Smoker   Smokeless Tobacco Current User     Social History     Substance and Sexual Activity   Drug Use No       Family History:  I have reviewed the patients family history    Physical Exam:   Vitals:   Blood Pressure: 134/88 (02/04/22 1654)  Pulse: 71 (02/04/22 1654)  Temperature: 97 9 °F (36 6 °C) (02/04/22 1654)  Temp Source: Temporal (02/04/22 1352)  Respirations: 22 (02/04/22 1654)  Height: 6' 2" (188 cm) (02/04/22 1352)  Weight - Scale: (!) 144 kg (318 lb) (02/04/22 1352)  SpO2: 98 % (02/04/22 1654)    Physical Exam  Constitutional:       General: He is not in acute distress  Appearance: He is obese  HENT:      Head: Normocephalic and atraumatic  Nose: Nose normal       Mouth/Throat:      Mouth: Mucous membranes are moist    Eyes:      Extraocular Movements: Extraocular movements intact  Conjunctiva/sclera: Conjunctivae normal    Cardiovascular:      Rate and Rhythm: Normal rate and regular rhythm  Pulmonary:      Effort: Pulmonary effort is normal  No respiratory distress  Abdominal:      General: There is no distension  Palpations: Abdomen is soft  Tenderness: There is abdominal tenderness  Musculoskeletal:         General: Normal range of motion        Cervical back: Normal range of motion and neck supple  Skin:     General: Skin is warm and dry  Neurological:      General: No focal deficit present  Mental Status: He is alert  Cranial Nerves: No cranial nerve deficit  Psychiatric:         Mood and Affect: Mood normal          Behavior: Behavior normal          Additional Data:   Lab Results: I have personally reviewed pertinent reports  Results from last 7 days   Lab Units 02/04/22  1435   WBC Thousand/uL 8 04   HEMOGLOBIN g/dL 14 7   HEMATOCRIT % 44 2   PLATELETS Thousands/uL 194   NEUTROS PCT % 57   LYMPHS PCT % 33   MONOS PCT % 7   EOS PCT % 2     Results from last 7 days   Lab Units 02/04/22  1435   SODIUM mmol/L 138   POTASSIUM mmol/L 4 0   CHLORIDE mmol/L 102   CO2 mmol/L 27   BUN mg/dL 16   CREATININE mg/dL 1 01   CALCIUM mg/dL 10 1   ALK PHOS U/L 65   ALT U/L 20   AST U/L 13     Results from last 7 days   Lab Units 02/04/22  1600   INR  1 09               Imaging: I have personally reviewed pertinent reports  CT abdomen pelvis with contrast   Final Result by Efrain Cardenas MD (02/04 1534)      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

## 2022-02-04 NOTE — ASSESSMENT & PLAN NOTE
· Follow-up with PCP as outpatient for possible referral to bariatrics or outpatient weight loss program

## 2022-02-05 VITALS
HEIGHT: 74 IN | DIASTOLIC BLOOD PRESSURE: 74 MMHG | TEMPERATURE: 98.1 F | BODY MASS INDEX: 40.43 KG/M2 | SYSTOLIC BLOOD PRESSURE: 126 MMHG | WEIGHT: 315 LBS | OXYGEN SATURATION: 96 % | HEART RATE: 68 BPM | RESPIRATION RATE: 18 BRPM

## 2022-02-05 LAB
ALBUMIN SERPL BCP-MCNC: 3.7 G/DL (ref 3.5–5)
ALP SERPL-CCNC: 60 U/L (ref 34–104)
ALT SERPL W P-5'-P-CCNC: 15 U/L (ref 7–52)
ANION GAP SERPL CALCULATED.3IONS-SCNC: 9 MMOL/L (ref 4–13)
APTT PPP: 65 SECONDS (ref 23–37)
AST SERPL W P-5'-P-CCNC: 10 U/L (ref 13–39)
BASOPHILS # BLD AUTO: 0.04 THOUSANDS/ΜL (ref 0–0.1)
BASOPHILS NFR BLD AUTO: 1 % (ref 0–1)
BILIRUB SERPL-MCNC: 1.47 MG/DL (ref 0.2–1)
BUN SERPL-MCNC: 16 MG/DL (ref 5–25)
CALCIUM SERPL-MCNC: 9.1 MG/DL (ref 8.4–10.2)
CHLORIDE SERPL-SCNC: 106 MMOL/L (ref 96–108)
CO2 SERPL-SCNC: 24 MMOL/L (ref 21–32)
CREAT SERPL-MCNC: 0.99 MG/DL (ref 0.6–1.3)
EOSINOPHIL # BLD AUTO: 0.18 THOUSAND/ΜL (ref 0–0.61)
EOSINOPHIL NFR BLD AUTO: 2 % (ref 0–6)
ERYTHROCYTE [DISTWIDTH] IN BLOOD BY AUTOMATED COUNT: 12.9 % (ref 11.6–15.1)
GFR SERPL CREATININE-BSD FRML MDRD: 87 ML/MIN/1.73SQ M
GLUCOSE SERPL-MCNC: 115 MG/DL (ref 65–140)
GLUCOSE SERPL-MCNC: 133 MG/DL (ref 65–140)
GLUCOSE SERPL-MCNC: 133 MG/DL (ref 65–140)
HCT VFR BLD AUTO: 37.9 % (ref 36.5–49.3)
HGB BLD-MCNC: 12.7 G/DL (ref 12–17)
IMM GRANULOCYTES # BLD AUTO: 0.02 THOUSAND/UL (ref 0–0.2)
IMM GRANULOCYTES NFR BLD AUTO: 0 % (ref 0–2)
LYMPHOCYTES # BLD AUTO: 3.19 THOUSANDS/ΜL (ref 0.6–4.47)
LYMPHOCYTES NFR BLD AUTO: 41 % (ref 14–44)
MAGNESIUM SERPL-MCNC: 1.8 MG/DL (ref 1.9–2.7)
MCH RBC QN AUTO: 28.6 PG (ref 26.8–34.3)
MCHC RBC AUTO-ENTMCNC: 33.5 G/DL (ref 31.4–37.4)
MCV RBC AUTO: 85 FL (ref 82–98)
MONOCYTES # BLD AUTO: 0.5 THOUSAND/ΜL (ref 0.17–1.22)
MONOCYTES NFR BLD AUTO: 7 % (ref 4–12)
NEUTROPHILS # BLD AUTO: 3.78 THOUSANDS/ΜL (ref 1.85–7.62)
NEUTS SEG NFR BLD AUTO: 49 % (ref 43–75)
NRBC BLD AUTO-RTO: 0 /100 WBCS
PLATELET # BLD AUTO: 163 THOUSANDS/UL (ref 149–390)
PMV BLD AUTO: 10.6 FL (ref 8.9–12.7)
POTASSIUM SERPL-SCNC: 3.8 MMOL/L (ref 3.5–5.3)
PROT SERPL-MCNC: 6.7 G/DL (ref 6.4–8.4)
RBC # BLD AUTO: 4.44 MILLION/UL (ref 3.88–5.62)
SODIUM SERPL-SCNC: 139 MMOL/L (ref 135–147)
WBC # BLD AUTO: 7.71 THOUSAND/UL (ref 4.31–10.16)

## 2022-02-05 PROCEDURE — 99205 OFFICE O/P NEW HI 60 MIN: CPT | Performed by: INTERNAL MEDICINE

## 2022-02-05 PROCEDURE — 80053 COMPREHEN METABOLIC PANEL: CPT | Performed by: INTERNAL MEDICINE

## 2022-02-05 PROCEDURE — 85025 COMPLETE CBC W/AUTO DIFF WBC: CPT | Performed by: INTERNAL MEDICINE

## 2022-02-05 PROCEDURE — 83735 ASSAY OF MAGNESIUM: CPT | Performed by: INTERNAL MEDICINE

## 2022-02-05 PROCEDURE — 99217 PR OBSERVATION CARE DISCHARGE MANAGEMENT: CPT | Performed by: INTERNAL MEDICINE

## 2022-02-05 PROCEDURE — 82948 REAGENT STRIP/BLOOD GLUCOSE: CPT

## 2022-02-05 PROCEDURE — 85730 THROMBOPLASTIN TIME PARTIAL: CPT | Performed by: INTERNAL MEDICINE

## 2022-02-05 RX ORDER — MAGNESIUM SULFATE HEPTAHYDRATE 40 MG/ML
2 INJECTION, SOLUTION INTRAVENOUS ONCE
Status: COMPLETED | OUTPATIENT
Start: 2022-02-05 | End: 2022-02-05

## 2022-02-05 RX ADMIN — LOSARTAN POTASSIUM 25 MG: 25 TABLET, FILM COATED ORAL at 09:34

## 2022-02-05 RX ADMIN — HEPARIN SODIUM 18 UNITS/KG/HR: 10000 INJECTION, SOLUTION INTRAVENOUS; SUBCUTANEOUS at 04:36

## 2022-02-05 RX ADMIN — MAGNESIUM SULFATE 2 G: 2 INJECTION INTRAVENOUS at 10:48

## 2022-02-05 RX ADMIN — APIXABAN 10 MG: 5 TABLET, FILM COATED ORAL at 11:32

## 2022-02-05 NOTE — CONSULTS
Consultation -  [facility]  Carlota Muller 46 y o  male MRN: [id number]  Unit/Bed#: -01 Encounter: [id number]        Consults    Reason for Consult / Principal Problem:     Abdominal pain, SMV thrombus      ASSESSMENT AND PLAN:      59-year-old male with recent upper respiratory infection presenting with over 1 week duration of moderate abdominal pain found to have diffuse SMV thrombosis  This appears to be in acute thrombosis, with underlying risk factor potentially being COVID however this is not for certain given that he was not tested during the time of illness  It would be worthwhile for patient to see Hematology for more thorough evaluation to ensure that there are no underlying hematologic disorders putting him at risk for thrombosis  Agree with anticoagulation  He will be transitioned to Eliquis  He will need at least 3-6 months of anticoagulation, which may be extended if there is an underlying clotting disorder  I have asked the patient to be vigilant of worsening abdominal pain, diarrhea or blood in his stool  If abdominal pain worsens, or if there is evidence of decompensation, surgical consultation should take place  Fortunately there is currently no evidence of bowel thickening or pneumatosis  ______________________________________________________________________    HPI:  Mr Yi Marcos is a 45 y/o male hypertension, type 2 diabetes, who presented yesterday due to moderate mid abdominal pain for roughly 1 week duration  Preceding the onset of abdominal pain he reports prolonged upper respiratory infection for which she is unsure if this was COVID as he did not get tested  He remained significantly ill for few weeks and this eventually resolved  Shortly thereafter he developed this abdominal pain that he thought was related to pulled muscle or irritation from coughing  However as time went on the pain persisted and a did not seem to be muscle related      Upon presentation he was hemodynamically stable and afebrile  Lab workup was unremarkable aside from mild total bilirubin elevation of 1 47, notably chronic in comparison to lab work in 2017 and 2021  His other liver tests were unremarkable  His CBC was within normal limits  CT abdomen pelvis demonstrated diffuse SMV thrombosis with mesenteric edema  No portal vein or splenic vein thrombus  There is no bowel wall thickening or pneumatosis  He was started on heparin drip yesterday  In review of risk factors for thrombosis, he denies any history of liver disease  He has never had a blood clot before  There is no family history of clotting disorders to his knowledge  He is a nonsmoker  He has no cancer history  He denies ever having pancreatitis  He has not had any gastric or other abdominal surgeries  REVIEW OF SYSTEMS:    CONSTITUTIONAL: Denies any fever, chills, rigors, and weight loss  HEENT: No earache or tinnitus  Denies hearing loss or visual disturbances  CARDIOVASCULAR: No chest pain or palpitations  RESPIRATORY: Denies any cough, hemoptysis, shortness of breath or dyspnea on exertion  GASTROINTESTINAL: As noted in the History of Present Illness  GENITOURINARY: No problems with urination  Denies any hematuria or dysuria  NEUROLOGIC: No dizziness or vertigo, denies headaches  MUSCULOSKELETAL: Denies any muscle or joint pain  SKIN: Denies skin rashes or itching  ENDOCRINE: Denies excessive thirst  Denies intolerance to heat or cold  PSYCHOSOCIAL: Denies depression or anxiety  Denies any recent memory loss         Historical Information   Past Medical History:   Diagnosis Date    Asthma     Diabetes mellitus ([facility] [address] )     Hypertension     Renal calculi     Last assessed: 1/11/18     Past Surgical History:   Procedure Laterality Date    CYSTOSCOPY W/ URETERAL STENT PLACEMENT  02/04/2014    CYSTOSCOPY W/ URETERAL STENT REMOVAL  02/11/2014    w/urteroscopy w/removal of calculus    KNEE SURGERY Right     STONE ANALYSIS (HISTORICAL)      TONSILECTOMY AND ADNOIDECTOMY      TONSILLECTOMY       Social History   Social History     Substance and Sexual Activity   Alcohol Use Not Currently    Alcohol/week: 0 0 standard drinks    Comment: Per [employer]: Occasional alcohol use     Social History     Substance and Sexual Activity   Drug Use No     Social History     Tobacco Use   Smoking Status Never Smoker   Smokeless Tobacco Current User     Family History   Problem Relation Age of Onset    Diabetes Mother         Mellitus    Hypertension Mother     Stroke Mother         Syndrome    Hypertension Father        Meds/Allergies     Facility-Administered Medications Prior to Admission   Medication    aluminum-magnesium hydroxide-simethicone (MYLANTA) oral suspension 30 mL    Lidocaine Viscous HCl (XYLOCAINE) 2 % mucosal solution 15 mL     Medications Prior to Admission   Medication    glipiZIDE (GLUCOTROL XL) 5 mg 24 hr tablet    losartan (COZAAR) 25 mg tablet    Alogliptin Benzoate 6 25 MG TABS    benzonatate (TESSALON PERLES) 100 mg capsule    ibuprofen (MOTRIN) 800 mg tablet    pantoprazole (PROTONIX) 40 mg tablet    Syringe, Disposable, (2-3CC SYRINGE) 3 ML MISC    Testosterone Cypionate 200 MG/ML SOLN     Current Facility-Administered Medications   Medication Dose Route Frequency    acetaminophen (TYLENOL) tablet 650 mg  650 mg Oral Q6H PRN    apixaban (ELIQUIS) tablet 10 mg  10 mg Oral BID    [START ON 2/12/2022] apixaban (ELIQUIS) tablet 5 mg  5 mg Oral BID    insulin lispro (HumaLOG) 100 units/mL subcutaneous injection 1-5 Units  1-5 Units Subcutaneous HS    insulin lispro (HumaLOG) 100 units/mL subcutaneous injection 2-12 Units  2-12 Units Subcutaneous TID AC    losartan (COZAAR) tablet 25 mg  25 mg Oral Daily    magnesium sulfate 2 g/50 mL IVPB (premix) 2 g  2 g Intravenous Once    morphine injection 2 mg  2 mg Intravenous Q4H PRN    ondansetron (ZOFRAN) injection 4 mg 4 mg Intravenous Q6H PRN    oxyCODONE (ROXICODONE) IR tablet 5 mg  5 mg Oral Q6H PRN       Allergies   Allergen Reactions    Penicillins GI Intolerance           Objective     Blood pressure 126/74, pulse 68, temperature 98 1 °F (36 7 °C), resp  rate 18, height 6' 2" (1 88 m), weight (!) 144 kg (318 lb), SpO2 96 %  Body mass index is 40 83 kg/m²  Intake/Output Summary (Last 24 hours) at 2/5/2022 1154  Last data filed at 2/4/2022 1809  Gross per 24 hour   Intake 1240 ml   Output --   Net 1240 ml         PHYSICAL EXAM:      General Appearance:   Alert, cooperative, no distress   HEENT:   Normocephalic, atraumatic, anicteric      Neck:  Supple, symmetrical, trachea midline   Lungs:   Clear to auscultation bilaterally; no rales, rhonchi or wheezing; respirations unlabored    Heart[de-identified]   Regular rate and rhythm; no murmur, rub, or gallop     Abdomen:   Soft, mild tenderness superior to umbilicus, no rebound or guarding, non-distended; normal bowel sounds; no masses, no organomegaly    Genitalia:   Deferred    Rectal:   Deferred    Extremities:  No cyanosis, clubbing or edema    Pulses:  2+ and symmetric all extremities    Skin:  No jaundice, rashes, or lesions    Lymph nodes:  No palpable cervical lymphadenopathy        Lab Results:   Admission on 02/04/2022   Component Date Value    WBC 02/04/2022 8 04     RBC 02/04/2022 5 09     Hemoglobin 02/04/2022 14 7     Hematocrit 02/04/2022 44 2     MCV 02/04/2022 87     MCH 02/04/2022 28 9     MCHC 02/04/2022 33 3     RDW 02/04/2022 12 9     MPV 02/04/2022 10 3     Platelets 44/68/0887 194     nRBC 02/04/2022 0     Neutrophils Relative 02/04/2022 57     Immat GRANS % 02/04/2022 0     Lymphocytes Relative 02/04/2022 33     Monocytes Relative 02/04/2022 7     Eosinophils Relative 02/04/2022 2     Basophils Relative 02/04/2022 1     Neutrophils Absolute 02/04/2022 4 65     Immature Grans Absolute 02/04/2022 0 02     Lymphocytes Absolute 02/04/2022 2 64  Monocytes Absolute 02/04/2022 0 52     Eosinophils Absolute 02/04/2022 0 17     Basophils Absolute 02/04/2022 0 04     Sodium 02/04/2022 138     Potassium 02/04/2022 4 0     Chloride 02/04/2022 102     CO2 02/04/2022 27     ANION GAP 02/04/2022 9     BUN 02/04/2022 16     Creatinine 02/04/2022 1 01     Glucose 02/04/2022 141*    Calcium 02/04/2022 10 1     AST 02/04/2022 13     ALT 02/04/2022 20     Alkaline Phosphatase 02/04/2022 65     Total Protein 02/04/2022 8 4     Albumin 02/04/2022 4 5     Total Bilirubin 02/04/2022 1 86*    eGFR 02/04/2022 85     Lipase 02/04/2022 52     Color, UA 02/04/2022 Yellow     Clarity, UA 02/04/2022 Clear     Specific Gravity, UA 02/04/2022 1 010     pH, UA 02/04/2022 5 5     Leukocytes, UA 02/04/2022 Negative     Nitrite, UA 02/04/2022 Negative     Protein, UA 02/04/2022 Negative     Glucose, UA 02/04/2022 Negative     Ketones, UA 02/04/2022 Negative     Urobilinogen, UA 02/04/2022 0 2     Bilirubin, UA 02/04/2022 Negative     Blood, UA 02/04/2022 Negative     SARS-CoV-2 02/04/2022 Negative     INFLUENZA A PCR 02/04/2022 Negative     INFLUENZA B PCR 02/04/2022 Negative     RSV PCR 02/04/2022 Negative     PTT 02/04/2022 32     Protime 02/04/2022 14 0     INR 02/04/2022 1 09     POC Glucose 02/04/2022 89     PTT 02/04/2022 76*    POC Glucose 02/04/2022 114     PTT 02/05/2022 65*    Sodium 02/05/2022 139     Potassium 02/05/2022 3 8     Chloride 02/05/2022 106     CO2 02/05/2022 24     ANION GAP 02/05/2022 9     BUN 02/05/2022 16     Creatinine 02/05/2022 0 99     Glucose 02/05/2022 133     Calcium 02/05/2022 9 1     AST 02/05/2022 10*    ALT 02/05/2022 15     Alkaline Phosphatase 02/05/2022 60     Total Protein 02/05/2022 6 7     Albumin 02/05/2022 3 7     Total Bilirubin 02/05/2022 1 47*    eGFR 02/05/2022 87     WBC 02/05/2022 7 71     RBC 02/05/2022 4 44     Hemoglobin 02/05/2022 12 7     Hematocrit 02/05/2022 37 9  MCV 02/05/2022 85     MCH 02/05/2022 28 6     MCHC 02/05/2022 33 5     RDW 02/05/2022 12 9     MPV 02/05/2022 10 6     Platelets 43/84/8836 163     nRBC 02/05/2022 0     Neutrophils Relative 02/05/2022 49     Immat GRANS % 02/05/2022 0     Lymphocytes Relative 02/05/2022 41     Monocytes Relative 02/05/2022 7     Eosinophils Relative 02/05/2022 2     Basophils Relative 02/05/2022 1     Neutrophils Absolute 02/05/2022 3 78     Immature Grans Absolute 02/05/2022 0 02     Lymphocytes Absolute 02/05/2022 3 19     Monocytes Absolute 02/05/2022 0 50     Eosinophils Absolute 02/05/2022 0 18     Basophils Absolute 02/05/2022 0 04     Magnesium 02/05/2022 1 8*    POC Glucose 02/05/2022 115     POC Glucose 02/05/2022 133        Imaging Studies: I have personally reviewed pertinent imaging studies

## 2022-02-05 NOTE — NURSING NOTE
Patient discharged in stable condition  Discharge instruction, F/U appointments and medications reviewed with patient  Questions addressed  My chart active

## 2022-02-05 NOTE — DISCHARGE SUMMARY
[address]  Discharge- Alejo Trinh 1970, 46 y o  male MRN: [id number]  Unit/Bed#: -01 Encounter: [id number]  Primary Care Provider: King Cornejo MD   Date and time admitted to hospital: 2/4/2022  2:17 PM    * Superior mesenteric vein thrombosis [facility])  Assessment & Plan  · Discussed findings with GI team today  · Patient was initially on heparin drip  Will transition to Eliquis for discharge  · Will need outpatient GI and hematology follow-up  · No further inpatient workup per GI team    Diabetes mellitus type 2 in obese [facility])  Assessment & Plan  · Continue home diabetic regimen    Morbid obesity with body mass index (BMI) of 40 0 or higher ([facility] )  Assessment & Plan  · Follow-up with PCP as outpatient for possible referral to bariatrics or outpatient weight loss program    Hypertension  Assessment & Plan  · BP currently stable  · Continue losartan      Discharging Physician / Practitioner: Griselda Chancellor, MD  PCP: King Cornejo MD  Admission Date:   Admission Orders (From admission, onward)     Ordered        02/04/22 1625  Place in Observation  Once                      Discharge Date: 02/05/22    Medical Problems             Resolved Problems  Date Reviewed: 2/4/2022    None                Consultations During Hospital Stay:  · Gastroenterology    Procedures Performed:   · None    Significant Findings / Test Results:   · Superior mesenteric vein thrombosis    Incidental Findings:   · None     Test Results Pending at Discharge (will require follow up): · None     Outpatient Tests Requested:  · Routine labs with PCP    Complications:     None    Reason for Admission:  Superior mesenteric vein thrombosis    Hospital Course:     Alejo Trinh is a 46 y o  male patient who originally presented to the hospital on 2/4/2022 due to abdominal pain  CT scan in the ER showed superior mesenteric vein thrombosis    Case was discussed with GI   GI recommended starting patient on anticoagulation and admitting for observation  Patient stable overnight  Transitioned to Eliquis today  Will need outpatient hematology and GI follow-up  Hypomagnesemia noted and patient was given magnesium supplementation  Patient advised to follow-up with PCP within 1 week of discharge    Please see above list of diagnoses and related plan for additional information  Condition at Discharge: stable     Discharge Day Visit / Exam:     Subjective:  Patient states his abdominal pain slightly improved today  Still present  Tolerating diet  No blood in stool  No nausea or vomiting    Vitals: Blood Pressure: 126/74 (02/05/22 0829)  Pulse: 68 (02/05/22 0829)  Temperature: 98 1 °F (36 7 °C) (02/05/22 0829)  Temp Source: Temporal (02/04/22 1352)  Respirations: 18 (02/05/22 0829)  Height: 6' 2" (188 cm) (02/04/22 1352)  Weight - Scale: (!) 144 kg (318 lb) (02/04/22 1352)  SpO2: 96 % (02/05/22 0829)     Exam:   Physical Exam  Constitutional:       General: He is not in acute distress  Appearance: He is obese  HENT:      Head: Normocephalic and atraumatic  Nose: Nose normal       Mouth/Throat:      Mouth: Mucous membranes are moist    Eyes:      Extraocular Movements: Extraocular movements intact  Conjunctiva/sclera: Conjunctivae normal    Cardiovascular:      Rate and Rhythm: Normal rate and regular rhythm  Pulmonary:      Effort: Pulmonary effort is normal  No respiratory distress  Abdominal:      Palpations: Abdomen is soft  Tenderness: There is abdominal tenderness  There is no guarding or rebound  Musculoskeletal:         General: Normal range of motion  Cervical back: Normal range of motion and neck supple  Skin:     General: Skin is warm and dry  Neurological:      General: No focal deficit present  Cranial Nerves: No cranial nerve deficit     Psychiatric:         Mood and Affect: Mood normal          Behavior: Behavior normal          Discharge instructions/Information to patient and family:   See after visit summary for information provided to patient and family  Provisions for Follow-Up Care:  See after visit summary for information related to follow-up care and any pertinent home health orders  Disposition:     Home      Planned Readmission:    no     Discharge Statement:  I spent 35 minutes discharging the patient  This time was spent on the day of discharge  I had direct contact with the patient on the day of discharge  Greater than 50% of the total time was spent examining patient, answering all patient questions, arranging and discussing plan of care with patient as well as directly providing post-discharge instructions  Additional time then spent on discharge activities  Discharge Medications:  See after visit summary for reconciled discharge medications provided to patient and family        ** Please Note: This note has been constructed using a voice recognition system **

## 2022-02-05 NOTE — ASSESSMENT & PLAN NOTE
· Discussed findings with GI team today  · Patient was initially on heparin drip    Will transition to Eliquis for discharge  · Will need outpatient GI and hematology follow-up  · No further inpatient workup per GI team

## 2022-02-05 NOTE — DISCHARGE INSTRUCTIONS
Cigarette Smoking and Your Health   WHAT YOU NEED TO KNOW:   What are the risks to my health if I smoke tobacco?  Nicotine and other chemicals found in tobacco and e-cigarettes can damage every cell in your body  Even if you are a light smoker, you have an increased risk for cancer, heart disease, and lung disease  If you are pregnant or have diabetes, smoking increases your risk for complications  Nicotine can affect an adolescent's developing brain  This can lead to trouble thinking, learning, or paying attention  What are the benefits to my health if I stop smoking? · You decrease respiratory symptoms such as coughing, wheezing, and shortness of breath  · You reduce your risk for cancers of the lung, mouth, throat, kidney, bladder, pancreas, stomach, and cervix  If you already have cancer, you increase the benefits of chemotherapy  You also reduce your risk for cancer returning or a second cancer from developing  · You reduce your risk for heart disease, blood clots, heart attack, and stroke  · You reduce your risk for lung infections, and diseases such as pneumonia, asthma, chronic bronchitis, and emphysema  · Your circulation improves  More oxygen can be delivered to your body  If you have diabetes, you lower your risk for complications, such as kidney, artery, and eye diseases  You also lower your risk for nerve damage  Nerve damage can lead to amputations, poor vision, and blindness  · You improve your body's ability to heal and to fight infections  · An adolescent can help his or her brain and body develop in a healthy way  Talk to your adolescent about all the health risks of nicotine  If you can, start talking about nicotine when your child is younger than 12 years  This may make it easier for him or her not to start using nicotine as a teenager or adult  Explain to him or her that it is best never to start  It can be hard to try to quit later      What are the health benefits to others if I stop smoking? Tobacco is harmful to nonsmokers who breathe in your secondhand smoke  The following are ways the health of others around you may improve when you stop smoking:  · You lower the risks for lung cancer and heart disease in nonsmoking adults  · If you are pregnant, you lower the risk for miscarriage, early delivery, low birth weight, and stillbirth  You also lower your baby's risk for SIDS, obesity, developmental delay, and neurobehavioral problems, such as ADHD  · If you have children, you lower their risk for ear infections, colds, pneumonia, bronchitis, and asthma  Where can I find support and more information? · American [employer] Association  [address] [facility]  [address]  Phone: [facility] [address]  Phone: 4- 701 - 457-6159  Web Address: [employer]    · Smokefree  gov  Phone: 7- 283 - 742-6712  Web Address: www smokefree  gov    CARE AGREEMENT:   You have the right to help plan your care  Learn about your health condition and how it may be treated  Discuss treatment options with your healthcare providers to decide what care you want to receive  You always have the right to refuse treatment  The above information is an  only  It is not intended as medical advice for individual conditions or treatments  Talk to your doctor, nurse or pharmacist before following any medical regimen to see if it is safe and effective for you  © Copyright [employer] 2021 Information is for End User's use only and may not be sold, redistributed or otherwise used for commercial purposes   All illustrations and images included in CareNotes® are the copyrighted property of [employer]  or [facility] [employer]

## 2022-02-05 NOTE — PLAN OF CARE
Problem: Potential for Falls  Goal: Patient will remain free of falls  Description: INTERVENTIONS:  - Educate patient/family on patient safety including physical limitations  - Instruct patient to call for assistance with activity   - Consult OT/PT to assist with strengthening/mobility   - Keep Call bell within reach  - Keep bed low and locked with side rails adjusted as appropriate  - Keep care items and personal belongings within reach  - Initiate and maintain comfort rounds  - Make Fall Risk Sign visible to staff  - Apply yellow socks and bracelet for high fall risk patients  - Consider moving patient to room near nurses station  Outcome: Adequate for Discharge     Problem: PAIN - ADULT  Goal: Verbalizes/displays adequate comfort level or baseline comfort level  Description: Interventions:  - Encourage patient to monitor pain and request assistance  - Assess pain using appropriate pain scale  - Administer analgesics based on type and severity of pain and evaluate response  - Implement non-pharmacological measures as appropriate and evaluate response  - Consider cultural and social influences on pain and pain management  - Notify physician/advanced practitioner if interventions unsuccessful or patient reports new pain  Outcome: Adequate for Discharge     Problem: INFECTION - ADULT  Goal: Absence or prevention of progression during hospitalization  Description: INTERVENTIONS:  - Assess and monitor for signs and symptoms of infection  - Monitor lab/diagnostic results  - Monitor all insertion sites, i e  indwelling lines, tubes, and drains  - Monitor endotracheal if appropriate and nasal secretions for changes in amount and color  - [place] appropriate cooling/warming therapies per order  - Administer medications as ordered  - Instruct and encourage patient and family to use good hand hygiene technique  - Identify and instruct in appropriate isolation precautions for identified infection/condition  Outcome: Adequate for Discharge  Goal: Absence of fever/infection during neutropenic period  Description: INTERVENTIONS:  - Monitor WBC    Outcome: Adequate for Discharge     Problem: SAFETY ADULT  Goal: Maintain or return to baseline ADL function  Description: INTERVENTIONS:  -  Assess patient's ability to carry out ADLs; assess patient's baseline for ADL function and identify physical deficits which impact ability to perform ADLs (bathing, care of mouth/teeth, toileting, grooming, dressing, etc )  - Assess/evaluate cause of self-care deficits   - Assess range of motion  - Assess patient's mobility; develop plan if impaired  - Assess patient's need for assistive devices and provide as appropriate  - Encourage maximum independence but intervene and supervise when necessary  - Involve family in performance of ADLs  - Assess for home care needs following discharge   - Consider OT consult to assist with ADL evaluation and planning for discharge  - Provide patient education as appropriate  Outcome: Adequate for Discharge  Goal: Maintains/Returns to pre admission functional level  Description: INTERVENTIONS:  - Perform BMAT or MOVE assessment daily    - Set and communicate daily mobility goal to care team and patient/family/caregiver     - Collaborate with rehabilitation services on mobility goals if consulted  - Out of bed for toileting  - Record patient progress and toleration of activity level   Outcome: Adequate for Discharge     Problem: DISCHARGE PLANNING  Goal: Discharge to home or other facility with appropriate resources  Description: INTERVENTIONS:  - Identify barriers to discharge w/patient and caregiver  - Arrange for needed discharge resources and transportation as appropriate  - Identify discharge learning needs (meds, wound care, etc )  - Arrange for interpretive services to assist at discharge as needed  - Refer to Case Management Department for coordinating discharge planning if the patient needs post-hospital services based on physician/advanced practitioner order or complex needs related to functional status, cognitive ability, or social support system  Outcome: Adequate for Discharge     Problem: Knowledge Deficit  Goal: Patient/family/caregiver demonstrates understanding of disease process, treatment plan, medications, and discharge instructions  Description: Complete learning assessment and assess knowledge base    Interventions:  - Provide teaching at level of understanding  - Provide teaching via preferred learning methods  Outcome: Adequate for Discharge

## 2022-02-05 NOTE — CASE MANAGEMENT
Case Management Discharge Planning Note    Patient name Beena Mix  Location /-01 MRN [id number]  : 1970 Date 2022       Current Admission Date: 2022  Current Admission Diagnosis:Superior mesenteric vein thrombosis [facility])   Patient Active Problem List    Diagnosis Date Noted    Superior mesenteric vein thrombosis ([facility] ) 2022    Diabetes mellitus type 2 in obese ([place] [address] ) 2022    Bronchitis 2019    Hyperlipidemia 2019    Injury of right wrist 2018    Infected cyst of skin 2018    Hypogonadism, male 2017    Klinefelter's syndrome 2017    Sleep disturbances 2017    Hypertension 2017    Morbid obesity with body mass index (BMI) of 40 0 or higher ([place] [address] ) 2017    Nephrolithiasis 2014      LOS (days): 0  Geometric Mean LOS (GMLOS) (days):   Days to GMLOS:     OBJECTIVE:            Current admission status: Observation   Preferred Pharmacy:   [facility]  [place] [address]  Phone: [phone] Fax: [phone] [address] [address]za, [address] [place] [address]  Phone: [phone] Fax: [phone]    Primary Care Provider: Bjorn Favre, MD    Primary Insurance: Waunita Tobi SHIELD  Secondary Insurance:     DISCHARGE DETAILS:    Discharge planning discussed with[de-identified] patient  Freedom of Choice: Yes  Comments - Freedom of Choice: pt was made aware he is able to use th free 30 coupon , he was made aware he needs authorization from his pcp for the eliquis  and then he maybe able to use james $10 copay coupon or he may not need a coupon  he stated he understood  CM contacted family/caregiver?: No- see comments (pt told me to not call his wife  spoke to pt)  Were Treatment Team discharge recommendations reviewed with patient/caregiver?: Yes (patient)  Did patient/caregiver verbalize understanding of patient care needs?: Yes (patient)  Were patient/caregiver advised of the risks associated with not following Treatment Team discharge recommendations?: Yes (patient)         [address]         Is the patient interested in [facility] at discharge?: No    DME Referral Provided  Referral made for DME?: No    Other Referral/Resources/Interventions Provided:  Financial Resources Provided: Prescription Discount Card    Would you like to participate in our [address] service program?  : No - Declined    Treatment Team Recommendation: Home (home with spouse)  Discharge Destination Plan[de-identified] Home (home with spouse)  Transport at Discharge : Trista Blount by: Family member           Family notified[de-identified] pt's sister is coming to transport him home and he has someone coming to  his car   pt is in agreement with [facility] d/c and d/c plan

## 2022-02-05 NOTE — CASE MANAGEMENT
Case Management Assessment & Discharge Planning Note    Patient name Bryson Andino  Location /-01 MRN [id number]  : 1970 Date 2022       Current Admission Date: 2022  Current Admission Diagnosis:Superior mesenteric vein thrombosis [facility])   Patient Active Problem List    Diagnosis Date Noted    Superior mesenteric vein thrombosis ([facility] ) 2022    Diabetes mellitus type 2 in obese ([place] [address] ) 2022    Bronchitis 2019    Hyperlipidemia 2019    Injury of right wrist 2018    Infected cyst of skin 2018    Hypogonadism, male 2017    Klinefelter's syndrome 2017    Sleep disturbances 2017    Hypertension 2017    Morbid obesity with body mass index (BMI) of 40 0 or higher ([place] [address] ) 2017    Nephrolithiasis 2014      LOS (days): 0  Geometric Mean LOS (GMLOS) (days):   Days to GMLOS:     OBJECTIVE:              Current admission status: Observation  Referral Reason: Other (d/c planning)    Preferred Pharmacy:   [facility] Dominguez  [place] [address] [place] [address]  Phone: [phone] Fax: [phone] [address] [address]  [place], [address] [place] [address]  Phone: [phone] Fax: [phone]    Primary Care Provider: Loreta Dumont MD    Primary Insurance: Rolena Paul SHIELD  Secondary Insurance:     ASSESSMENT:  Active Health Care Agents    There are no active Health Care Agents on file         Advance Directives  Does patient have a [address] [place] [address] Avenue?: No  Was patient offered paperwork?: No (pt declined)  Does patient have Advance Directives?: No  Was patient offered paperwork?: No (pt declined)              Patient Information  Admitted from[de-identified] Home  Mental Status: Alert  During Assessment patient was accompanied by: Not accompanied during assessment  Assessment information provided by[de-identified] Patient  Primary Caregiver: Self  Support Systems: Spouse/significant other  [place] of Residence: [address] do you live in?: [facility] entry access options   Select all that apply : Stairs  Number of steps to enter home : 2  Do the steps have railings?: Yes  Type of Current Residence: 2 story home  Upon entering residence, is there a bedroom on the main floor (no further steps)?: No  A bedroom is located on the following floor levels of residence (select all that apply):: 2nd Floor  Upon entering residence, is there a bathroom on the main floor (no further steps)?: No  Indicate which floors of current residence have a bathroom (select all the apply):: 2nd Floor  Number of steps to 2nd floor from main floor: One Flight  In the last 12 months, was there a time when you were not able to pay the mortgage or rent on time?: No  In the last 12 months, how many places have you lived?: 1  In the last 12 months, was there a time when you did not have a steady place to sleep or slept in a shelter (including now)?: No  Homeless/housing insecurity resource given?: N/A  Living Arrangements: Lives w/ Spouse/significant other  Is patient a ?: No    Activities of Daily Living Prior to Admission  Functional Status: Independent  Completes ADLs independently?: Yes  Ambulates independently?: Yes  Does patient use assisted devices?: No  Does patient currently own DME?: Yes  What DME does the patient currently own?: Other (Comment) (glucometer)  Does patient have a history of Outpatient Therapy (PT/OT)?: Yes  Does the patient have a history of Short-Term Rehab?: No  Does patient have a history of HHC?: No  Does patient currently have [address]?: No         Patient Information Continued  Income Source: Employed ()  Does patient have prescription coverage?: Yes  Within the past 12 months, you worried that your food would run out before you got the money to buy more : Never true  Within the past 12 months, the food you bought just didnt last and you didnt have money to get more : Never true  Food insecurity resource given?: N/A  Does patient receive dialysis treatments?: No  Does patient have a history of substance abuse?: No  Does patient have a history of Mental Health Diagnosis?: No         Means of Transportation  Means of Transport to Appts[de-identified] Drives Self  In the past 12 months, has lack of transportation kept you from medical appointments or from getting medications?: No  In the past 12 months, has lack of transportation kept you from meetings, work, or from getting things needed for daily living?: No  Was application for public transport provided?: N/A        DISCHARGE DETAILS:    Discharge planning discussed with[de-identified] patient  and he asked that I not call his wife since she was sleeping  Freedom of Choice: Yes  Comments - Freedom of Choice: pt denies any d/c needs,  pt was givena free eliquis coupon, pt is activating a $10 per month copay if he qualifies  CM contacted family/caregiver?: No- see comments (pt told me to not call his wife  spoke to pt)  Were Treatment Team discharge recommendations reviewed with patient/caregiver?: Yes (patient)  Did patient/caregiver verbalize understanding of patient care needs?: Yes (patient)  Were patient/caregiver advised of the risks associated with not following Treatment Team discharge recommendations?: Yes (patient)         [address]         Is the patient interested in [place] [address] at discharge?: No    DME Referral Provided  Referral made for DME?: No    Other Referral/Resources/Interventions Provided:  Financial Resources Provided: Prescription Discount Card  Referral Comments: price check for eliquis    Would you like to participate in our [address] service program?  : No - Declined    Treatment Team Recommendation: Home (home with spouse)  Discharge Destination Plan[de-identified] Home (home with spouse)  Transport at Discharge : Dorys Mendoza

## 2022-02-05 NOTE — UTILIZATION REVIEW
Initial Clinical Review    Admission: Date/Time/Statement:   Admission Orders (From admission, onward)     Ordered        02/04/22 1625  Place in Observation  Once                      Orders Placed This Encounter   Procedures    Place in Observation     Standing Status:   Standing     Number of Occurrences:   1     Order Specific Question:   Level of Care     Answer:   Med Surg [16]     ED Arrival Information     Expected Arrival Acuity    - 2/4/2022 13:46 Urgent         Means of arrival Escorted by Service Admission type    Walk-In Self Hospitalist Urgent         Arrival complaint    abdominal pain        Chief Complaint   Patient presents with    Abdominal Pain     upper abdominal eleni     Initial Presentation:   Mr Vani Love is a 46 yom who presents to the ED from home with c/o persistent abd pain, described as pulling type of nonradiating pain on R side of abd x 1 week  Pain is worse with food, no fevers, chills, N/V, had viral illness 3-4 wks PTA  Negative covid in ED  Tolerating diet  No blood in stools  In the ED imaging showed diffuse superior mesenteric vein thrombosis and he was started on Heparin drip  He is admitted to OBSERVATION status with Superior mesenteric vein thrombosis - PRN analgesia, continue Heparin drip, IV fluids, GI Consult  NIDDM - SSI cover, hold Glipizide  PMH: NIDDM, HTN, obesity        Date: 2/5:    ED Triage Vitals [02/04/22 1352]   Temperature Pulse Respirations Blood Pressure SpO2   97 5 °F (36 4 °C) 88 20 160/78 98 %      Temp Source Heart Rate Source Patient Position - Orthostatic VS BP Location FiO2 (%)   Temporal Monitor Sitting Right arm --      Pain Score       2          Wt Readings from Last 1 Encounters:   02/04/22 (!) 144 kg (318 lb)     Additional Vital Signs:   02/05/22 08:29:06 98 1 °F (36 7 °C) 68 18 126/74 91 96 % None (Room air) --   02/04/22 16:54:52 97 9 °F (36 6 °C) 71 22 134/88 103 98 % -- --     Pertinent Labs/Diagnostic Test Results:     2/4 CTAP - 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, thrombus extends centrally to but not into the portal confluence  No portal vein or splenic vein thrombus    No bowel wall thickening or bowel pneumatosis      Results from last 7 days   Lab Units 02/04/22  1501   SARS-COV-2  Negative     Results from last 7 days   Lab Units 02/05/22  0445 02/04/22  1435   WBC Thousand/uL 7 71 8 04   HEMOGLOBIN g/dL 12 7 14 7   HEMATOCRIT % 37 9 44 2   PLATELETS Thousands/uL 163 194   NEUTROS ABS Thousands/µL 3 78 4 65         Results from last 7 days   Lab Units 02/05/22  0445 02/04/22  1435   SODIUM mmol/L 139 138   POTASSIUM mmol/L 3 8 4 0   CHLORIDE mmol/L 106 102   CO2 mmol/L 24 27   ANION GAP mmol/L 9 9   BUN mg/dL 16 16   CREATININE mg/dL 0 99 1 01   EGFR ml/min/1 73sq m 87 85   CALCIUM mg/dL 9 1 10 1   MAGNESIUM mg/dL 1 8*  --      Results from last 7 days   Lab Units 02/05/22  0445 02/04/22  1435   AST U/L 10* 13   ALT U/L 15 20   ALK PHOS U/L 60 65   TOTAL PROTEIN g/dL 6 7 8 4   ALBUMIN g/dL 3 7 4 5   TOTAL BILIRUBIN mg/dL 1 47* 1 86*     Results from last 7 days   Lab Units 02/05/22  0646 02/04/22  2104 02/04/22  1659   POC GLUCOSE mg/dl 115 114 89     Results from last 7 days   Lab Units 02/05/22  0445 02/04/22  1435   GLUCOSE RANDOM mg/dL 133 141*     Results from last 7 days   Lab Units 02/05/22  0445 02/04/22  2210 02/04/22  1600   PROTIME seconds  --   --  14 0   INR   --   --  1 09   PTT seconds 65* 76* 32     Results from last 7 days   Lab Units 02/04/22  1435   LIPASE u/L 52     Results from last 7 days   Lab Units 02/04/22  1824   CLARITY UA  Clear   COLOR UA  Yellow   SPEC GRAV UA  1 010   PH UA  5 5   GLUCOSE UA mg/dl Negative   KETONES UA mg/dl Negative   BLOOD UA  Negative   PROTEIN UA mg/dl Negative   NITRITE UA  Negative   BILIRUBIN UA  Negative   UROBILINOGEN UA E U /dl 0 2   LEUKOCYTES UA  Negative     Results from last 7 days   Lab Units 02/04/22  1501   INFLUENZA A PCR Negative   INFLUENZA B PCR  Negative   RSV PCR  Negative     ED Treatment:   Medication Administration from 02/04/2022 1346 to 02/04/2022 1647    Date/Time Order Dose Route Action   02/04/2022 1454 famotidine (PEPCID) injection 20 mg 20 mg Intravenous Given   02/04/2022 1454 ketorolac (TORADOL) injection 15 mg 15 mg Intravenous Given   02/04/2022 1454 sodium chloride 0 9 % bolus 1,000 mL 1,000 mL Intravenous New Bag   02/04/2022 1520 iohexol (OMNIPAQUE) 350 MG/ML injection (SINGLE-DOSE) 100 mL 100 mL Intravenous Given   02/04/2022 1640 heparin (porcine) 25,000 units in 0 45% in sodium chloride 250 ml infusion (CMPD) 18 Units/kg/hr Intravenous New Bag        Past Medical History:   Diagnosis Date    Asthma     Diabetes mellitus ([facility] )     Hypertension     Renal calculi     Last assessed: 1/11/18     Present on Admission:   Morbid obesity with body mass index (BMI) of 40 0 or higher ([facility])   Hypertension      Admitting Diagnosis: Abdominal pain [R10 9]  Superior mesenteric vein thrombosis (HCC) [K55 069]  Age/Sex: 46 y o  male  Admission Orders:  Scheduled Medications:  insulin lispro, 1-5 Units, Subcutaneous, HS  insulin lispro, 2-12 Units, Subcutaneous, TID AC  losartan, 25 mg, Oral, Daily  magnesium sulfate, 2 g, Intravenous, Once      Continuous IV Infusions:  heparin (porcine), 3-30 Units/kg/hr (Order-Specific), Intravenous, Titrated      PRN Meds:  acetaminophen, 650 mg, Oral, Q6H PRN  morphine injection, 2 mg, Intravenous, Q4H PRN  ondansetron, 4 mg, Intravenous, Q6H PRN  oxyCODONE, 5 mg, Oral, Q6H PRN    Heparin drip  POC GLUCOSE AC/HS WITH SSI COVERAGE   OOB as victor hugo  IP CONSULT TO GASTROENTEROLOGY    Network Utilization Review Department  ATTENTION: Please call with any questions or concerns to [phone] and carefully listen to the prompts so that you are directed to the right person   All voicemails are confidential   Kadi Skipper all requests for admission clinical reviews, approved or denied determinations and any other requests to dedicated fax number below belonging to the campus where the patient is receiving treatment   List of dedicated fax numbers for the Facilities:  [address] [place] [address] DENIALS (Administrative/Medical Necessity) [phone]   [address] (Maternity/NICU/Pediatrics) [phone]   [address] [place] [address] Medical [place] [address] [address] [place] [address] [address] [facility] [place] [address] [phone]

## 2022-02-05 NOTE — PLAN OF CARE
Problem: Potential for Falls  Goal: Patient will remain free of falls  Description: INTERVENTIONS:  - Educate patient/family on patient safety including physical limitations  - Instruct patient to call for assistance with activity   - Consult OT/PT to assist with strengthening/mobility   - Keep Call bell within reach  - Keep bed low and locked with side rails adjusted as appropriate  - Keep care items and personal belongings within reach  - Initiate and maintain comfort rounds  - Make Fall Risk Sign visible to staff  - Offer Toileting  in advance of need  - Initiate/Maintain bed alarm  - Obtain necessary fall risk management equipment:   - Apply yellow socks and bracelet for high fall risk patients  - Consider moving patient to room near nurses station  Outcome: Progressing     Problem: PAIN - ADULT  Goal: Verbalizes/displays adequate comfort level or baseline comfort level  Description: Interventions:  - Encourage patient to monitor pain and request assistance  - Assess pain using appropriate pain scale  - Administer analgesics based on type and severity of pain and evaluate response  - Implement non-pharmacological measures as appropriate and evaluate response  - Consider cultural and social influences on pain and pain management  - Notify physician/advanced practitioner if interventions unsuccessful or patient reports new pain  Outcome: Progressing     Problem: INFECTION - ADULT  Goal: Absence or prevention of progression during hospitalization  Description: INTERVENTIONS:  - Assess and monitor for signs and symptoms of infection  - Monitor lab/diagnostic results  - Monitor all insertion sites, i e  indwelling lines, tubes, and drains  - Monitor endotracheal if appropriate and nasal secretions for changes in amount and color  - [place] appropriate cooling/warming therapies per order  - Administer medications as ordered  - Instruct and encourage patient and family to use good hand hygiene technique  - Identify and instruct in appropriate isolation precautions for identified infection/condition  Outcome: Progressing  Goal: Absence of fever/infection during neutropenic period  Description: INTERVENTIONS:  - Monitor WBC    Outcome: Progressing     Problem: SAFETY ADULT  Goal: Maintain or return to baseline ADL function  Description: INTERVENTIONS:  -  Assess patient's ability to carry out ADLs; assess patient's baseline for ADL function and identify physical deficits which impact ability to perform ADLs (bathing, care of mouth/teeth, toileting, grooming, dressing, etc )  - Assess/evaluate cause of self-care deficits   - Assess range of motion  - Assess patient's mobility; develop plan if impaired  - Assess patient's need for assistive devices and provide as appropriate  - Encourage maximum independence but intervene and supervise when necessary  - Involve family in performance of ADLs  - Assess for home care needs following discharge   - Consider OT consult to assist with ADL evaluation and planning for discharge  - Provide patient education as appropriate  Outcome: Progressing  Goal: Maintains/Returns to pre admission functional level  Description: INTERVENTIONS:  - Perform BMAT or MOVE assessment daily    - Set and communicate daily mobility goal to care team and patient/family/caregiver     - Collaborate with rehabilitation services on mobility goals if consulted    - Out of bed for toileting  - Record patient progress and toleration of activity level   Outcome: Progressing     Problem: DISCHARGE PLANNING  Goal: Discharge to home or other facility with appropriate resources  Description: INTERVENTIONS:  - Identify barriers to discharge w/patient and caregiver  - Arrange for needed discharge resources and transportation as appropriate  - Identify discharge learning needs (meds, wound care, etc )  - Arrange for interpretive services to assist at discharge as needed  - Refer to Case Management Department for coordinating discharge planning if the patient needs post-hospital services based on physician/advanced practitioner order or complex needs related to functional status, cognitive ability, or social support system  Outcome: Progressing     Problem: Knowledge Deficit  Goal: Patient/family/caregiver demonstrates understanding of disease process, treatment plan, medications, and discharge instructions  Description: Complete learning assessment and assess knowledge base    Interventions:  - Provide teaching at level of understanding  - Provide teaching via preferred learning methods  Outcome: Progressing

## 2022-02-07 ENCOUNTER — TELEPHONE (OUTPATIENT)
Dept: HEMATOLOGY ONCOLOGY | Facility: CLINIC | Age: 52
End: 2022-02-07

## 2022-02-07 ENCOUNTER — TRANSITIONAL CARE MANAGEMENT (OUTPATIENT)
Dept: FAMILY MEDICINE CLINIC | Facility: CLINIC | Age: 52
End: 2022-02-07

## 2022-02-07 NOTE — TELEPHONE ENCOUNTER
New Patient Intake Form   Patient Details:    Jose Juan Lujan  1970  [id number]    Appointment Information   Who is calling to schedule? Patient   If not self, what is the caller's name? Please put name of [facility] nurse as well  Referring provider Brett Martinez MD   What is the diagnosis? - Superior mesenteric vein thrombosis    Is there a confirmed tissue diagnosis? No   Is patient aware of diagnosis? Yes   Have you had any imaging or labs done? If yes, where? (If imaging done outside of [facility], please remind patient to bring a disk ) Yes       2/5/22   Have you been seen by another Oncologist/Hematologist?  If so, who and where? no   Are the records in [facility] or Care Everywhere? yes   Are records needed from an outside facility? no   If yes, Name of facility, city and state where facility is located  n/a   Preferred [facility]   Is the patient willing to be seen by another provider?   (This is for breast patients only) n/a   Miscellaneous Information: Appt made for 2/17

## 2022-02-08 LAB
ATRIAL RATE: 67 BPM
LEFT EYE DIABETIC RETINOPATHY: NORMAL
P AXIS: 28 DEGREES
PR INTERVAL: 142 MS
QRS AXIS: 2 DEGREES
QRSD INTERVAL: 86 MS
QT INTERVAL: 402 MS
QTC INTERVAL: 424 MS
RIGHT EYE DIABETIC RETINOPATHY: NORMAL
SEVERITY (EYE EXAM): NORMAL
T WAVE AXIS: 1 DEGREES
VENTRICULAR RATE: 67 BPM

## 2022-02-08 PROCEDURE — 2023F DILAT RTA XM W/O RTNOPTHY: CPT | Performed by: INTERNAL MEDICINE

## 2022-02-08 PROCEDURE — 93010 ELECTROCARDIOGRAM REPORT: CPT | Performed by: INTERNAL MEDICINE

## 2022-02-08 RX ORDER — APIXABAN 5 MG (74)
KIT ORAL
COMMUNITY
Start: 2022-02-05 | End: 2022-03-08 | Stop reason: SDUPTHER

## 2022-02-09 ENCOUNTER — OFFICE VISIT (OUTPATIENT)
Dept: FAMILY MEDICINE CLINIC | Facility: CLINIC | Age: 52
End: 2022-02-09
Payer: COMMERCIAL

## 2022-02-09 ENCOUNTER — TELEPHONE (OUTPATIENT)
Dept: ADMINISTRATIVE | Facility: OTHER | Age: 52
End: 2022-02-09

## 2022-02-09 VITALS
DIASTOLIC BLOOD PRESSURE: 72 MMHG | BODY MASS INDEX: 38.5 KG/M2 | SYSTOLIC BLOOD PRESSURE: 118 MMHG | WEIGHT: 300 LBS | HEIGHT: 74 IN | OXYGEN SATURATION: 97 % | HEART RATE: 69 BPM | TEMPERATURE: 98 F

## 2022-02-09 DIAGNOSIS — Z12.12 SCREENING FOR COLORECTAL CANCER: ICD-10-CM

## 2022-02-09 DIAGNOSIS — Z12.11 SCREENING FOR COLORECTAL CANCER: ICD-10-CM

## 2022-02-09 DIAGNOSIS — E11.9 TYPE 2 DIABETES MELLITUS WITHOUT COMPLICATION, WITHOUT LONG-TERM CURRENT USE OF INSULIN (HCC): ICD-10-CM

## 2022-02-09 DIAGNOSIS — K55.069 SUPERIOR MESENTERIC VEIN THROMBOSIS (HCC): ICD-10-CM

## 2022-02-09 DIAGNOSIS — Z76.89 ENCOUNTER FOR SUPPORT AND COORDINATION OF TRANSITION OF CARE: Primary | ICD-10-CM

## 2022-02-09 LAB — SL AMB POCT HEMOGLOBIN AIC: 6.8 (ref ?–6.5)

## 2022-02-09 PROCEDURE — 83036 HEMOGLOBIN GLYCOSYLATED A1C: CPT | Performed by: STUDENT IN AN ORGANIZED HEALTH CARE EDUCATION/TRAINING PROGRAM

## 2022-02-09 PROCEDURE — 3044F HG A1C LEVEL LT 7.0%: CPT | Performed by: INTERNAL MEDICINE

## 2022-02-09 PROCEDURE — 99496 TRANSJ CARE MGMT HIGH F2F 7D: CPT | Performed by: STUDENT IN AN ORGANIZED HEALTH CARE EDUCATION/TRAINING PROGRAM

## 2022-02-09 PROCEDURE — 1111F DSCHRG MED/CURRENT MED MERGE: CPT | Performed by: STUDENT IN AN ORGANIZED HEALTH CARE EDUCATION/TRAINING PROGRAM

## 2022-02-09 NOTE — PROGRESS NOTES
Assessment/Plan/Follow up information     Diagnosis ICD-10-CM Associated Orders   1  Encounter for support and coordination of transition of care  Z76 89    2  Superior mesenteric vein thrombosis (HCC)  K55 069 apixaban (Eliquis) 5 mg     Ambulatory Referral to Gastroenterology     Ambulatory Referral to Hematology / Oncology   3  Type 2 diabetes mellitus without complication, without long-term current use of insulin ([facility] [address] )  E11 9 Ambulatory Referral to Ophthalmology     POCT hemoglobin A1c     Ambulatory Referral to Podiatry   4  Screening for colorectal cancer  Z12 11 Ambulatory Referral to Gastroenterology    Z12 12       A1c today 6 8 significantly decreased from previous A1cs  Patient continues to lose weight and exercise with improvement in his diet  Will continue with his current diabetic regimen  He is scheduled to see Podiatry Ophthalmology as well as Gastroenterology and Hematology for workup of the superior mesenteric vein thrombosis as well as routine screening for colon rectal cancer      Patient in agreement with the plan, all questions and concerns were answered/addressed  Advised to contact me or the office with any concerns or questions  In the event of an emergency, and unable to contact a provider they are to go to the emergency room  Subjective    HPI:  60-year-old male presents to the office for transition of care  Patient recently hospitalized and discharged on 02/05/2022 after presents to the emergency department with abdominal pain  He is found have superior mesenteric vein thrombosis  While hospitalized she was started on oral anticoagulation and seen in conjunction with Gastroenterology  He was stabilized and discharged with provision is to follow-up with both his PCP and Gastroenterology  BMI Counseling: Body mass index is 38 52 kg/m²   The BMI is above normal  Nutrition recommendations include reducing portion sizes, decreasing overall calorie intake, 3-5 servings of fruits/vegetables daily and reducing fast food intake  Exercise recommendations include moderate aerobic physical activity for 150 minutes/week  Per CT:  VESSELS:  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, thrombus extends centrally to the level but not into the portal confluence  No portal vein or splenic vein thrombus  Review of Systems   Constitutional: Negative for activity change, appetite change, chills, fatigue and fever  HENT: Negative for congestion, dental problem, drooling, ear discharge, ear pain, facial swelling, postnasal drip, rhinorrhea and sinus pain  Eyes: Negative for photophobia, pain, discharge and itching  Respiratory: Negative for apnea, cough, chest tightness and shortness of breath  Cardiovascular: Negative for chest pain and leg swelling  Gastrointestinal: Positive for abdominal pain  Negative for abdominal distention, anal bleeding, constipation, diarrhea and nausea  Endocrine: Negative for cold intolerance, heat intolerance and polydipsia  Genitourinary: Negative for difficulty urinating  Musculoskeletal: Negative for arthralgias, gait problem, joint swelling and myalgias  Skin: Negative for color change and pallor  Allergic/Immunologic: Negative for immunocompromised state  Neurological: Negative for dizziness, seizures, facial asymmetry, weakness, light-headedness, numbness and headaches  Psychiatric/Behavioral: Negative for agitation, behavioral problems, confusion, decreased concentration and dysphoric mood  All other systems reviewed and are negative  Objective    Vitals:    02/09/22 0924   BP: 118/72   Pulse: 69   Temp: 98 °F (36 7 °C)   SpO2: 97%         Physical Exam  Constitutional:       Appearance: He is well-developed  He is obese  HENT:      Head: Normocephalic  Eyes:      Pupils: Pupils are equal, round, and reactive to light     Cardiovascular:      Rate and Rhythm: Normal rate and regular rhythm  Pulses:           Dorsalis pedis pulses are 1+ on the right side and 1+ on the left side  Posterior tibial pulses are 1+ on the right side and 1+ on the left side  Pulmonary:      Effort: Pulmonary effort is normal       Breath sounds: Normal breath sounds  Abdominal:      General: Bowel sounds are normal       Palpations: Abdomen is soft  Musculoskeletal:         General: Normal range of motion  Cervical back: Normal range of motion and neck supple  Feet:      Right foot:      Skin integrity: Callus and dry skin present  No ulcer, skin breakdown, erythema or warmth  Left foot:      Skin integrity: Callus and dry skin present  No ulcer, skin breakdown, erythema or warmth  Skin:     General: Skin is warm  Patient's shoes and socks removed  Right Foot/Ankle   Right Foot Inspection  Skin Exam: skin normal, skin intact, dry skin, callus and callus  No warmth, no erythema, no maceration, no abnormal color, no pre-ulcer and no ulcer  Toe Exam: ROM and strength within normal limits  No swelling, no tenderness and erythema    Sensory   Vibration: intact  Proprioception: intact  Monofilament testing: diminished    Vascular  Capillary refills: < 3 seconds  The right DP pulse is 1+  The right PT pulse is 1+  Left Foot/Ankle  Left Foot Inspection  Skin Exam: skin normal, skin intact, dry skin and callus  No warmth, no erythema, no maceration, normal color, no pre-ulcer and no ulcer  Toe Exam: ROM and strength within normal limits  No swelling, no tenderness and no erythema  Sensory   Vibration: intact  Proprioception: intact  Monofilament testing: diminished    Vascular  The left DP pulse is 1+  The left PT pulse is 1+  Assign Risk Category  No deformity present  Risk: 1        Portions of the record may have been created with voice recognition software   Occasional wrong word or "sound a like" substitutions may have occurred due to the inherent limitations of voice recognition software  Read the chart carefully and recognize, using context, where substitutions have occurred  Contact me with any questions         Tena Brambila MD 02/09/22

## 2022-02-09 NOTE — LETTER
Diabetic Eye Exam Form    Date Requested: 22  Patient: Demetrius Lott  Patient : 1970   Referring Provider: Felicitas Monae MD    Dilated Retinal Exam, Optomap-Iris Exam, or Fundus Photography Done      IF   Yes ([place] WHICH one above)         No     Date of Diabetic Eye Exam ______________________________  Left Eye      Exam did show retinopathy    Exam did not show retinopathy         Mild       Moderate       None       Proliferative       Severe     Right Eye     Exam did show retinopathy    Exam did not show retinopathy         Mild       Moderate       None       Proliferative       Severe     Comments __Dos -   Practice Providing Exam ______________________________________________    Exam Performed By (print name) _______________________________________      Provider Signature ___________________________________________________      These reports are needed for  compliance  Please fax this completed form and a copy of the Diabetic Eye Exam report to our office located at [place] [address] as soon as possible via [phone] [facility]: Phone [phone]    We thank you for your assistance in treating our mutual patient

## 2022-02-09 NOTE — PROGRESS NOTES
Diabetic Foot Exam    Patient's shoes and socks removed  Right Foot/Ankle   Right Foot Inspection  Skin Exam: skin normal and skin intact  No dry skin, no warmth, no callus, no erythema, no maceration, no abnormal color, no pre-ulcer, no ulcer and no callus  Toe Exam: ROM and strength within normal limits  Sensory   Vibration: intact  Proprioception: intact  Monofilament testing: intact    Vascular  Capillary refills: < 3 seconds  The right DP pulse is 2+  Left Foot/Ankle  Left Foot Inspection  Skin Exam: skin normal and skin intact  No dry skin, no warmth, no erythema, no maceration, normal color, no pre-ulcer, no ulcer and no callus  Toe Exam: ROM and strength within normal limits  Sensory   Vibration: intact  Proprioception: intact  Monofilament testing: intact    Vascular  Capillary refills: < 3 seconds  The left DP pulse is 2+  The left PT pulse is 2+       Assign Risk Category  No deformity present  No loss of protective sensation  No weak pulses  Risk: 0

## 2022-02-09 NOTE — TELEPHONE ENCOUNTER
----- Message from [facility] sent at 2/9/2022 10:16 AM EST -----  Regarding: Care Gap Request  02/09/22 10:16 AM    Hello, our patient attached above has had eye exam completed/performed  Please assist in updating the patient chart  The date of service is 2/2022  Pt had done at [facility] in [address]      Thank you,  [facility]  TITI MILLER

## 2022-02-10 ENCOUNTER — CONSULT (OUTPATIENT)
Dept: GASTROENTEROLOGY | Facility: CLINIC | Age: 52
End: 2022-02-10
Payer: COMMERCIAL

## 2022-02-10 VITALS
HEIGHT: 72 IN | TEMPERATURE: 97.6 F | BODY MASS INDEX: 40.71 KG/M2 | WEIGHT: 300.6 LBS | DIASTOLIC BLOOD PRESSURE: 76 MMHG | SYSTOLIC BLOOD PRESSURE: 130 MMHG

## 2022-02-10 DIAGNOSIS — Z12.11 COLON CANCER SCREENING: ICD-10-CM

## 2022-02-10 DIAGNOSIS — K55.069 SUPERIOR MESENTERIC VEIN THROMBOSIS (HCC): ICD-10-CM

## 2022-02-10 DIAGNOSIS — R10.10 PAIN OF UPPER ABDOMEN: Primary | ICD-10-CM

## 2022-02-10 PROCEDURE — 99214 OFFICE O/P EST MOD 30 MIN: CPT | Performed by: NURSE PRACTITIONER

## 2022-02-10 NOTE — PROGRESS NOTES
[facility] - Outpatient Follow-up Note  Brandan Cr 46 y o  male MRN: [id number]  Encounter: [id number]          ASSESSMENT AND PLAN:      1  Superior mesenteric vein thrombosis (HCC)  2  Pain of upper abdomen    Patient had presented to the emergency department due to mid to epigastric abdominal pain that he thought was originally related to a pulled muscle however it started to get worse  CT scan revealed a diffuse SMV thrombosis with extensive left abdominal mesenteric edema, thrombus extending centrally to but not into the portal confluence  There was no portal vein or splenic vein thrombosis  He was started on Eliquis  He is to follow with Hematology to evaluate for underlying hematological disorders  Patient reports that the abdominal pain it has is improved  He states that he only has it when he eats or stands up  Patient denies nausea, vomiting or heartburn  Suspect pain is related to SMV thrombosis however would recommend EGD to evaluate other causes such as but not limited to reflux esophagitis, peptic ulcer disease or H pylori  Process, risks and benefits discussed with the patient, he is agreeable  - Ambulatory referral to Gastroenterology  - EGD; Future    3  Colon cancer screening    Patient denies any issues with constipation, diarrhea, black or bloody stools  Reports that he has never had a colonoscopy in the past   Patient is past due for screening, would recommend at this time  Process, risks and benefits discussed with the patient, he is agreeable  - Colonoscopy; Future    Will see patient back after procedure   ______________________________________________________________________    SUBJECTIVE:  Brandyn Sanon is a 66-year-old male presents for follow-up after hospital visit for a superior mesenteric vein thrombosis    Patient reports that he had presented to the emergency department due to mid to epigastric abdominal pain that he thought was originally related to a pulled muscle however it started to get worse  He underwent a CT scan the abdomen that revealed a diffuse SMV thrombosis with extensive left abdominal mesenteric edema, thrombus extending centrally to but not into the portal confluence  There was no portal vein or splenic vein thrombosis  Patient was started on Eliquis while in the hospital   Patient is to follow-up with Hematology to evaluate for underlying hematologic disorders  Patient reports that he had previously been ill and taking antibiotics but felt that the pain came on fairly suddenly after some lifting  Patient currently reports that his pain is improved  He feels it when he eats or stands up  He denies nausea or vomiting  He does report having heartburn and past however states that after his diabetes was under control his heartburn subsided  Patient reports that he sometimes does have a cough  Patient has never had an EGD or colonoscopy  Denies significant constipation, diarrhea, black or bloody stools  REVIEW OF SYSTEMS:  Review of Systems   Constitutional: Negative for fatigue, fever and unexpected weight change  HENT: Negative for trouble swallowing  Respiratory: Positive for cough  Negative for shortness of breath  Cardiovascular: Negative for chest pain  Gastrointestinal: Positive for abdominal pain  Negative for abdominal distention, anal bleeding, blood in stool, constipation, diarrhea, nausea, rectal pain and vomiting  Neurological: Negative for weakness           Historical Information   Past Medical History:   Diagnosis Date    Asthma     Diabetes mellitus ([facility] [address] )     Hypertension     Renal calculi     Last assessed: 1/11/18     Past Surgical History:   Procedure Laterality Date    CYSTOSCOPY W/ URETERAL STENT PLACEMENT  02/04/2014    CYSTOSCOPY W/ URETERAL STENT REMOVAL  02/11/2014    w/urteroscopy w/removal of calculus    KNEE SURGERY Right     STONE ANALYSIS (HISTORICAL)      TONSILECTOMY AND ADNOIDECTOMY      TONSILLECTOMY       Social History   Social History     Substance and Sexual Activity   Alcohol Use Not Currently    Alcohol/week: 0 0 standard drinks    Comment: Per [employer]: Occasional alcohol use     Social History     Substance and Sexual Activity   Drug Use No     Social History     Tobacco Use   Smoking Status Never Smoker   Smokeless Tobacco Current User     Family History   Problem Relation Age of Onset    Diabetes Mother         Mellitus    Hypertension Mother     Stroke Mother         Syndrome    Hypertension Father        Meds/Allergies       Current Outpatient Medications:     apixaban (Eliquis) 5 mg    apixaban (Eliquis) 5 mg    Eliquis DVT/PE Starter Pack 5 MG TBPK    glipiZIDE (GLUCOTROL XL) 5 mg 24 hr tablet    losartan (COZAAR) 25 mg tablet    Syringe, Disposable, (2-3CC SYRINGE) 3 ML MISC    Current Facility-Administered Medications:     aluminum-magnesium hydroxide-simethicone (MYLANTA) oral suspension 30 mL, 30 mL, Oral, Q4H PRN    Lidocaine Viscous HCl (XYLOCAINE) 2 % mucosal solution 15 mL, 15 mL, Swish & Spit, 4x Daily PRN    Allergies   Allergen Reactions    Penicillins GI Intolerance           Objective     There were no vitals taken for this visit  There is no height or weight on file to calculate BMI  PHYSICAL EXAM:      General Appearance:   Alert, cooperative, no distress   HEENT:   Normocephalic, atraumatic, anicteric  Neck:  Supple, symmetrical, trachea midline   Lungs:   Clear to auscultation bilaterally; no rales, rhonchi or wheezing; respirations unlabored    Heart[de-identified]   Regular rate and rhythm; no murmur, rub, or gallop     Abdomen:   Soft, mid to epigastric area tenderness, non-distended; normal bowel sounds; no masses, no organomegaly    Genitalia:   Deferred    Rectal:   Deferred    Extremities:  No cyanosis, clubbing or edema    Skin:  No jaundice, rashes, or lesions             Lab Results:   No visits with results within 1 Day(s) from this visit  Latest known visit with results is:   Office Visit on 02/09/2022   Component Date Value    Hemoglobin A1C 02/09/2022 6 8*         Radiology Results:   CT abdomen pelvis with contrast    Result Date: 2/4/2022  Narrative: CT ABDOMEN AND PELVIS WITH IV CONTRAST INDICATION:   Generalized abdominal pain, predominantly left lower quadrant  COMPARISON:  CT abdomen pelvis 9/15/2017  TECHNIQUE:  CT examination of the abdomen and pelvis was performed  Axial, sagittal, and coronal 2D reformatted images were created from the source data and submitted for interpretation  Radiation dose length product (DLP) for this visit:  1513 15 mGy-cm   This examination, like all CT scans performed in the [facility], was performed utilizing techniques to minimize radiation dose exposure, including the use of iterative reconstruction and automated exposure control  IV Contrast:  100 mL of iohexol (OMNIPAQUE) Enteric Contrast:  Enteric contrast was not administered  FINDINGS: ABDOMEN LOWER CHEST:  No clinically significant abnormality identified in the visualized lower chest  LIVER/BILIARY TREE:  Unremarkable  GALLBLADDER:  No calcified gallstones  No pericholecystic inflammatory change  SPLEEN:  Unremarkable  PANCREAS:  Unremarkable  ADRENAL GLANDS:  Unremarkable  KIDNEYS/URETERS:  Reidentified bilateral simple parapelvic cysts  No hydronephrosis  STOMACH AND BOWEL:  No bowel wall thickening or bowel pneumatosis in this patient with SMV thrombus  Extensive left vasa recta engorgement  APPENDIX:  Noninflamed ABDOMINOPELVIC CAVITY:  No ascites  No pneumoperitoneum  No lymphadenopathy  VESSELS:  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, thrombus extends centrally to the level but not into the portal confluence  No portal vein or splenic vein thrombus  PELVIS REPRODUCTIVE ORGANS:  Unremarkable for patient's age  URINARY BLADDER:  Unremarkable   ABDOMINAL WALL/INGUINAL REGIONS:  Unremarkable  OSSEOUS STRUCTURES:  No acute fracture or destructive osseous lesion  Impression: 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, thrombus extends centrally to but not into the portal confluence  No portal vein or splenic vein thrombus  No bowel wall thickening or bowel pneumatosis    I personally discussed this study with Bebo Gomez on 2/4/2022 at 3:34 PM  Workstation performed: [id number]

## 2022-02-10 NOTE — PATIENT INSTRUCTIONS
Scheduled patient for  Colonoscopy/EGD ON 4/19/2022 with MD Barry Krueger patient  Instructions for his Colonoscopy/EGD  Follow up appointment on 5/19/2022 with Dr June Chavira MD  Patient expressed understanding

## 2022-02-11 NOTE — TELEPHONE ENCOUNTER
Upon review of the In Basket request we were able to locate, review, and update the patient chart as requested for Diabetic Eye Exam     Any additional questions or concerns should be emailed to the Practice Liaisons via [email or web address] email, please do not reply via In Basket      Thank you  Elba Cordoba

## 2022-02-17 ENCOUNTER — CONSULT (OUTPATIENT)
Dept: HEMATOLOGY ONCOLOGY | Facility: CLINIC | Age: 52
End: 2022-02-17
Payer: COMMERCIAL

## 2022-02-17 VITALS
WEIGHT: 303 LBS | BODY MASS INDEX: 41.04 KG/M2 | HEART RATE: 64 BPM | SYSTOLIC BLOOD PRESSURE: 108 MMHG | DIASTOLIC BLOOD PRESSURE: 68 MMHG | TEMPERATURE: 97.9 F | HEIGHT: 72 IN | OXYGEN SATURATION: 99 % | RESPIRATION RATE: 18 BRPM

## 2022-02-17 DIAGNOSIS — K55.069 SUPERIOR MESENTERIC VEIN THROMBOSIS (HCC): Primary | ICD-10-CM

## 2022-02-17 PROCEDURE — 1036F TOBACCO NON-USER: CPT | Performed by: INTERNAL MEDICINE

## 2022-02-17 PROCEDURE — 3078F DIAST BP <80 MM HG: CPT | Performed by: INTERNAL MEDICINE

## 2022-02-17 PROCEDURE — 3074F SYST BP LT 130 MM HG: CPT | Performed by: INTERNAL MEDICINE

## 2022-02-17 PROCEDURE — 3008F BODY MASS INDEX DOCD: CPT | Performed by: INTERNAL MEDICINE

## 2022-02-17 PROCEDURE — 99204 OFFICE O/P NEW MOD 45 MIN: CPT | Performed by: INTERNAL MEDICINE

## 2022-02-17 NOTE — PROGRESS NOTES
[address] [facility] - Hematology & Medical Oncology  Outpatient Visit Encounter Note      Brandan Cr 46 y o  male DOB1970 [id number] Date:  2/17/2022      Waldemar Alva is a 46 y o  here for new consultation with me today  The patient is referred by Mamie Leong MD and the reason for consultation is K55 069 (ICD-10-CM) - Superior mesenteric vein thrombosis  In review of the chart and talking with the patient, he got ER care recently due to abdominal pain and was diagnosed with extensive SMV thrombosis  He was seen by GI and then started on anti-coagulation  He is taking Eliquis and has no complaints  He says that his acute issues have resolved since starting Eliquis  Denies personal cancer or clotting history  Denies unintentional weight loss, alcohol abuse history, tobacco use currently  No f/c/n/v/cp/ap/sob/cough  I have reviewed the relevant past medical, surgical, social and family history  I have also reviewed allergies and medications for this patient  Review of Systems  Review of Systems   All other systems reviewed and are negative  OBJECTIVE     Physical Exam  Vitals:    02/17/22 1021   BP: 108/68   BP Location: Left arm   Patient Position: Sitting   Cuff Size: Adult   Pulse: 64   Resp: 18   Temp: 97 9 °F (36 6 °C)   SpO2: 99%   Weight: (!) 137 kg (303 lb)   Height: 6' (1 829 m)       Physical Exam  Vitals reviewed  Constitutional:       General: He is not in acute distress  Appearance: He is not ill-appearing, toxic-appearing or diaphoretic  HENT:      Head: Normocephalic and atraumatic  Eyes:      General: No scleral icterus  Extraocular Movements: Extraocular movements intact  Cardiovascular:      Rate and Rhythm: Normal rate  Pulmonary:      Effort: Pulmonary effort is normal  No respiratory distress  Abdominal:      General: There is no distension  Musculoskeletal:         General: Normal range of motion  Cervical back: Normal range of motion and neck supple  Neurological:      General: No focal deficit present  Mental Status: He is alert and oriented to person, place, and time  Gait: Gait normal           Imaging  Relevant imaging reviewed in chart    Labs  Relevant labs reviewed in chart   ASSESSMENT & PLAN      Diagnosis ICD-10-CM Associated Orders   1  Superior mesenteric vein thrombosis ([facility] [address] )  K55 069 Ambulatory Referral to Hematology / Oncology     Ambulatory Referral to Hematology / Oncology     Prothrombin gene mutation     Factor 5 leiden     Leukemia/Lymphoma flow cytometry     JAK2 V617F,Ql,W/RFL Exons 12,13 and MPL L658,L533     PNH profile, WBC     Cardiolipin antibody     Lupus anticoagulant     Beta-2 glycoprotein antibodies     Ambulatory Referral to Oncology Social Worker         46 y o  male diagnosed with apparent unprovoked SMV thrombosis in February 2022  · Discussion/Plan/Labs  · I recommend comprehensive assessment with labs as ordered above  · Recommend GI follow-up  · Recommend Eliquis for at least 6 months and likely longer duration as well, pending diagnostic work-up  Follow Up   4-6 weeks      All questions were answered to the patient's satisfaction during this encounter  They appreciated and thanked me for spending time with them  The patient knows the contact information for our office and know to reach out for any relevant concerns related to this encounter  For all other listed problems and medical diagnosis in his chart - they are managed by PCP and/or other specialists, which patient acknowledges  Dr Audie Donald MD  Hematology & Medical Oncology

## 2022-02-22 ENCOUNTER — PATIENT OUTREACH (OUTPATIENT)
Dept: CASE MANAGEMENT | Facility: HOSPITAL | Age: 52
End: 2022-02-22

## 2022-02-22 NOTE — PROGRESS NOTES
MSW reviewed pt's chart prior to outreach, he completed a DT at his Hem Onc appt, however he is not an oncology pt  Chart review shows that pt is already connected to an outpt CM through his PCP office, message sent via Teams to let her know of pt's noted concerns and that I will be closing out this referral   No other needs at this time

## 2022-03-07 ENCOUNTER — TELEPHONE (OUTPATIENT)
Dept: FAMILY MEDICINE CLINIC | Facility: CLINIC | Age: 52
End: 2022-03-07

## 2022-03-07 DIAGNOSIS — K55.069 SUPERIOR MESENTERIC VEIN THROMBOSIS (HCC): ICD-10-CM

## 2022-03-07 NOTE — TELEPHONE ENCOUNTER
Pt was seen 2/9 by dr Feliberto Dyer, pts mother called and states pt was approved for eliquis but the copay is close to $500 and they cannot afford that, asking if there is something else that would be cheaper    ty

## 2022-03-08 ENCOUNTER — TELEPHONE (OUTPATIENT)
Dept: FAMILY MEDICINE CLINIC | Facility: CLINIC | Age: 52
End: 2022-03-08

## 2022-03-08 DIAGNOSIS — K55.069 SUPERIOR MESENTERIC VEIN THROMBOSIS (HCC): Primary | ICD-10-CM

## 2022-03-08 NOTE — PROGRESS NOTES
[facility]  Christian Bernstein    Gita Arauz is a 46 y o  male who was referred to the pharmacist for Eliquis cost, referred by Jacques Duffy MD     Assessment / Plan     1  High cost Medication Plan  · Patient has commercial insurance and can use a  coupon card  Assisted patient with obtaining  coupon card    · Copay at pharmacy with card is now $10  Informed patient of savings at pharmacy  Eliquis Co-pay Card  STF:[id number]  PCN:[employer]  GFZVX:[id number]  MO:[id number]     Subjective     1  Medication cost- Eliquis is $500 for 30 day supply  Called insurance and this is covered on his plan but a high co-pay since he has not yet met his deductible  Patient has high deductible plan ($3000 total) and is only assisted to meeting it at this point       Pharmacist Tracking Tool         Reason For Outreach  Embedded Pharmacist    Demographics  Interaction Method: Phone  Type of Intervention: New    Topic(s) Addressed  Anticoagulation    Intervention(s) Made      Non-Pharmacologic:    -- Adherence addressed  -- Cost    Tool(s) Used  Not Applicable    Time Spent:   Time Spent in Direct Patient Care: 10 minutes  Time Spent in Care Coordination: 20 minutes    Recommendations  Recipient: Patient/caregiver  Outcome: Cost Savings Information Provided

## 2022-03-09 ENCOUNTER — APPOINTMENT (OUTPATIENT)
Dept: LAB | Facility: HOSPITAL | Age: 52
End: 2022-03-09
Attending: INTERNAL MEDICINE
Payer: COMMERCIAL

## 2022-03-09 DIAGNOSIS — E11.9 TYPE 2 DIABETES MELLITUS WITHOUT COMPLICATION, WITHOUT LONG-TERM CURRENT USE OF INSULIN (HCC): ICD-10-CM

## 2022-03-09 DIAGNOSIS — Z23 ENCOUNTER FOR IMMUNIZATION: ICD-10-CM

## 2022-03-09 DIAGNOSIS — Z12.12 SCREENING FOR COLORECTAL CANCER: ICD-10-CM

## 2022-03-09 DIAGNOSIS — K55.069 SUPERIOR MESENTERIC VEIN THROMBOSIS (HCC): ICD-10-CM

## 2022-03-09 DIAGNOSIS — I10 ESSENTIAL HYPERTENSION: ICD-10-CM

## 2022-03-09 DIAGNOSIS — R05.9 COUGH: ICD-10-CM

## 2022-03-09 DIAGNOSIS — Z12.11 SCREENING FOR COLORECTAL CANCER: ICD-10-CM

## 2022-03-09 DIAGNOSIS — E78.5 HYPERLIPIDEMIA, UNSPECIFIED HYPERLIPIDEMIA TYPE: ICD-10-CM

## 2022-03-09 DIAGNOSIS — J32.9 SINUSITIS, UNSPECIFIED CHRONICITY, UNSPECIFIED LOCATION: ICD-10-CM

## 2022-03-09 LAB
ALBUMIN SERPL BCP-MCNC: 4.3 G/DL (ref 3.5–5)
ALP SERPL-CCNC: 49 U/L (ref 34–104)
ALT SERPL W P-5'-P-CCNC: 21 U/L (ref 7–52)
ANION GAP SERPL CALCULATED.3IONS-SCNC: 7 MMOL/L (ref 4–13)
AST SERPL W P-5'-P-CCNC: 13 U/L (ref 13–39)
BILIRUB SERPL-MCNC: 1.22 MG/DL (ref 0.2–1)
BUN SERPL-MCNC: 16 MG/DL (ref 5–25)
CALCIUM SERPL-MCNC: 9.8 MG/DL (ref 8.4–10.2)
CHLORIDE SERPL-SCNC: 105 MMOL/L (ref 96–108)
CHOLEST SERPL-MCNC: 175 MG/DL
CO2 SERPL-SCNC: 27 MMOL/L (ref 21–32)
CREAT SERPL-MCNC: 0.99 MG/DL (ref 0.6–1.3)
EST. AVERAGE GLUCOSE BLD GHB EST-MCNC: 134 MG/DL
GFR SERPL CREATININE-BSD FRML MDRD: 87 ML/MIN/1.73SQ M
GLUCOSE P FAST SERPL-MCNC: 125 MG/DL (ref 65–99)
HBA1C MFR BLD: 6.3 %
HDLC SERPL-MCNC: 35 MG/DL
LDLC SERPL CALC-MCNC: 124 MG/DL (ref 0–100)
NONHDLC SERPL-MCNC: 140 MG/DL
POTASSIUM SERPL-SCNC: 4.5 MMOL/L (ref 3.5–5.3)
PROT SERPL-MCNC: 7.4 G/DL (ref 6.4–8.4)
SODIUM SERPL-SCNC: 139 MMOL/L (ref 135–147)
TRIGL SERPL-MCNC: 81 MG/DL
TSH SERPL DL<=0.05 MIU/L-ACNC: 2.06 UIU/ML (ref 0.45–5.33)

## 2022-03-09 PROCEDURE — 85613 RUSSELL VIPER VENOM DILUTED: CPT

## 2022-03-09 PROCEDURE — 88185 FLOWCYTOMETRY/TC ADD-ON: CPT

## 2022-03-09 PROCEDURE — 81270 JAK2 GENE: CPT

## 2022-03-09 PROCEDURE — 80053 COMPREHEN METABOLIC PANEL: CPT

## 2022-03-09 PROCEDURE — 85670 THROMBIN TIME PLASMA: CPT

## 2022-03-09 PROCEDURE — 80061 LIPID PANEL: CPT

## 2022-03-09 PROCEDURE — 85732 THROMBOPLASTIN TIME PARTIAL: CPT

## 2022-03-09 PROCEDURE — 83036 HEMOGLOBIN GLYCOSYLATED A1C: CPT

## 2022-03-09 PROCEDURE — 81240 F2 GENE: CPT

## 2022-03-09 PROCEDURE — 86147 CARDIOLIPIN ANTIBODY EA IG: CPT

## 2022-03-09 PROCEDURE — 84443 ASSAY THYROID STIM HORMONE: CPT

## 2022-03-09 PROCEDURE — 81241 F5 GENE: CPT

## 2022-03-09 PROCEDURE — 36415 COLL VENOUS BLD VENIPUNCTURE: CPT

## 2022-03-09 PROCEDURE — 88184 FLOWCYTOMETRY/ TC 1 MARKER: CPT

## 2022-03-09 PROCEDURE — 86146 BETA-2 GLYCOPROTEIN ANTIBODY: CPT

## 2022-03-09 PROCEDURE — 3044F HG A1C LEVEL LT 7.0%: CPT | Performed by: INTERNAL MEDICINE

## 2022-03-09 PROCEDURE — 85705 THROMBOPLASTIN INHIBITION: CPT

## 2022-03-11 LAB
APTT SCREEN TO CONFIRM RATIO: 0.83 RATIO (ref 0–1.34)
B2 GLYCOPROT1 IGA SERPL IA-ACNC: 2
B2 GLYCOPROT1 IGG SERPL IA-ACNC: 1.3
B2 GLYCOPROT1 IGM SERPL IA-ACNC: 3.7
CARDIOLIPIN IGA SER IA-ACNC: 2.1
CARDIOLIPIN IGG SER IA-ACNC: 1.1
CARDIOLIPIN IGM SER IA-ACNC: 3.8
CONFIRM APTT/NORMAL: 33.3 SEC (ref 0–47.6)
LA PPP-IMP: NORMAL
SCREEN APTT: 37 SEC (ref 0–51.9)
SCREEN DRVVT: 33.7 SEC (ref 0–47)
THROMBIN TIME: 18.3 SEC (ref 0–23)

## 2022-03-12 LAB
SCAN RESULT: NORMAL
SCAN RESULT: NORMAL

## 2022-03-15 LAB — F5 GENE MUT ANL BLD/T: NORMAL

## 2022-03-18 LAB — F2 GENE MUT ANL BLD/T: NORMAL

## 2022-03-21 ENCOUNTER — DOCUMENTATION (OUTPATIENT)
Dept: HEMATOLOGY ONCOLOGY | Facility: CLINIC | Age: 52
End: 2022-03-21

## 2022-03-21 ENCOUNTER — OFFICE VISIT (OUTPATIENT)
Dept: HEMATOLOGY ONCOLOGY | Facility: CLINIC | Age: 52
End: 2022-03-21
Payer: COMMERCIAL

## 2022-03-21 VITALS
OXYGEN SATURATION: 98 % | RESPIRATION RATE: 18 BRPM | SYSTOLIC BLOOD PRESSURE: 122 MMHG | HEIGHT: 72 IN | WEIGHT: 300 LBS | BODY MASS INDEX: 40.63 KG/M2 | TEMPERATURE: 97.8 F | DIASTOLIC BLOOD PRESSURE: 70 MMHG | HEART RATE: 64 BPM

## 2022-03-21 DIAGNOSIS — Z79.01 CHRONIC ANTICOAGULATION: ICD-10-CM

## 2022-03-21 DIAGNOSIS — K55.069 SUPERIOR MESENTERIC VEIN THROMBOSIS (HCC): Primary | ICD-10-CM

## 2022-03-21 PROCEDURE — 3078F DIAST BP <80 MM HG: CPT | Performed by: INTERNAL MEDICINE

## 2022-03-21 PROCEDURE — 99214 OFFICE O/P EST MOD 30 MIN: CPT | Performed by: INTERNAL MEDICINE

## 2022-03-21 PROCEDURE — 1036F TOBACCO NON-USER: CPT | Performed by: INTERNAL MEDICINE

## 2022-03-21 PROCEDURE — 3074F SYST BP LT 130 MM HG: CPT | Performed by: INTERNAL MEDICINE

## 2022-03-21 PROCEDURE — 3008F BODY MASS INDEX DOCD: CPT | Performed by: INTERNAL MEDICINE

## 2022-03-21 NOTE — PROGRESS NOTES
[address] [facility] - Hematology & Medical Oncology  Outpatient Visit Encounter Note      Heather Guzman 46 y o  male DOB1970 [id number] Date:  3/21/2022      Elsie Ross is a 46 y o  here for new consultation with me today  The patient is referred by Dc Escamilla MD and the reason for consultation is K55 069 (ICD-10-CM) - Superior mesenteric vein thrombosis  In review of the chart and talking with the patient, he got ER care recently due to abdominal pain and was diagnosed with extensive SMV thrombosis  He was seen by GI and then started on anti-coagulation  He is taking Eliquis and has no complaints  He says that his acute issues have resolved since starting Eliquis  Denies personal cancer or clotting history  Denies unintentional weight loss, alcohol abuse history, tobacco use currently  No f/c/n/v/cp/ap/sob/cough  THIS VISIT    Denies any f/c/n/v/cp/ap/sob/cough  Denies diarrhea or skin rash  No bleeding issues  I have reviewed the relevant past medical, surgical, social and family history  I have also reviewed allergies and medications for this patient  Review of Systems  Review of Systems   All other systems reviewed and are negative  OBJECTIVE     Physical Exam  Vitals:    03/21/22 0834   BP: 122/70   BP Location: Left arm   Patient Position: Sitting   Cuff Size: Large   Pulse: 64   Resp: 18   Temp: 97 8 °F (36 6 °C)   TempSrc: Temporal   SpO2: 98%   Weight: 136 kg (300 lb)   Height: 6' (1 829 m)       Physical Exam  Vitals reviewed  Constitutional:       General: He is not in acute distress  Appearance: He is obese  He is not toxic-appearing  HENT:      Head: Normocephalic and atraumatic  Eyes:      General: No scleral icterus  Cardiovascular:      Rate and Rhythm: Normal rate  Pulmonary:      Effort: Pulmonary effort is normal  No respiratory distress  Abdominal:      General: There is no distension     Musculoskeletal: General: No swelling  Normal range of motion  Cervical back: Normal range of motion and neck supple  Neurological:      General: No focal deficit present  Mental Status: He is alert and oriented to person, place, and time  Mental status is at baseline  Imaging  Relevant imaging reviewed in chart    Labs  Relevant labs reviewed in chart   ASSESSMENT & PLAN      Diagnosis ICD-10-CM Associated Orders   1  Superior mesenteric vein thrombosis (HCC)  K55 069 CBC     Basic metabolic panel   2  Chronic anticoagulation  Z79 01 CBC     Basic metabolic panel         46 y o  male diagnosed with apparent unprovoked SMV thrombosis in February 2022  · Discussion/Plan/Labs  · I recommend comprehensive assessment with negative work-up thus far  · JAK2 reflex test is pending and MA will call to inquire about result  · Recommend continued GI follow-up  · Recommend Eliquis for at least 6 months and likely longer duration as well, pending diagnostic work-up  Follow Up   3 months      All questions were answered to the patient's satisfaction during this encounter  They appreciated and thanked me for spending time with them  The patient knows the contact information for our office and know to reach out for any relevant concerns related to this encounter  For all other listed problems and medical diagnosis in his chart - they are managed by PCP and/or other specialists, which patient acknowledges  Dr Luan Del Valle MD  Hematology & Medical Oncology

## 2022-03-21 NOTE — PROGRESS NOTES
Called [facility] to inquire about the JAK2 lab test completed on 3/9/2022  Spoke with Summer Rosenbaum who confirmed that this send out test is still in process in the state of [place] and has a estimated final date of 3/28/2022  36.5

## 2022-03-25 LAB — MISCELLANEOUS LAB TEST RESULT: NORMAL

## 2022-03-28 DIAGNOSIS — I10 ESSENTIAL HYPERTENSION: ICD-10-CM

## 2022-03-28 PROCEDURE — 4010F ACE/ARB THERAPY RXD/TAKEN: CPT | Performed by: INTERNAL MEDICINE

## 2022-03-28 RX ORDER — LOSARTAN POTASSIUM 25 MG/1
TABLET ORAL
Qty: 90 TABLET | Refills: 0 | Status: SHIPPED | OUTPATIENT
Start: 2022-03-28 | End: 2022-06-27 | Stop reason: SDUPTHER

## 2022-03-29 ENCOUNTER — TELEPHONE (OUTPATIENT)
Dept: PODIATRY | Facility: CLINIC | Age: 52
End: 2022-03-29

## 2022-03-31 ENCOUNTER — RA CDI HCC (OUTPATIENT)
Dept: OTHER | Facility: HOSPITAL | Age: 52
End: 2022-03-31

## 2022-03-31 NOTE — PROGRESS NOTES
[facility]  coding opportunities       Chart reviewed, no opportunity found: CHART REVIEWED, NO OPPORTUNITY FOUND        Patients Insurance        Commercial Insurance: [employer]

## 2022-04-05 ENCOUNTER — TELEPHONE (OUTPATIENT)
Dept: GASTROENTEROLOGY | Facility: CLINIC | Age: 52
End: 2022-04-05

## 2022-04-08 ENCOUNTER — TELEPHONE (OUTPATIENT)
Dept: GASTROENTEROLOGY | Facility: CLINIC | Age: 52
End: 2022-04-08

## 2022-04-18 ENCOUNTER — TELEPHONE (OUTPATIENT)
Dept: GASTROENTEROLOGY | Facility: CLINIC | Age: 52
End: 2022-04-18

## 2022-05-21 ENCOUNTER — APPOINTMENT (OUTPATIENT)
Dept: LAB | Facility: HOSPITAL | Age: 52
End: 2022-05-21
Attending: INTERNAL MEDICINE
Payer: COMMERCIAL

## 2022-05-21 DIAGNOSIS — K55.069 SUPERIOR MESENTERIC VEIN THROMBOSIS (HCC): ICD-10-CM

## 2022-05-21 DIAGNOSIS — Z79.01 CHRONIC ANTICOAGULATION: ICD-10-CM

## 2022-05-21 LAB
ANION GAP SERPL CALCULATED.3IONS-SCNC: 6 MMOL/L (ref 4–13)
BUN SERPL-MCNC: 16 MG/DL (ref 5–25)
CALCIUM SERPL-MCNC: 9.6 MG/DL (ref 8.4–10.2)
CHLORIDE SERPL-SCNC: 105 MMOL/L (ref 96–108)
CO2 SERPL-SCNC: 28 MMOL/L (ref 21–32)
CREAT SERPL-MCNC: 0.95 MG/DL (ref 0.6–1.3)
ERYTHROCYTE [DISTWIDTH] IN BLOOD BY AUTOMATED COUNT: 12.9 % (ref 11.6–15.1)
GFR SERPL CREATININE-BSD FRML MDRD: 92 ML/MIN/1.73SQ M
GLUCOSE P FAST SERPL-MCNC: 183 MG/DL (ref 65–99)
HCT VFR BLD AUTO: 43.9 % (ref 36.5–49.3)
HGB BLD-MCNC: 14.7 G/DL (ref 12–17)
MCH RBC QN AUTO: 29.6 PG (ref 26.8–34.3)
MCHC RBC AUTO-ENTMCNC: 33.5 G/DL (ref 31.4–37.4)
MCV RBC AUTO: 88 FL (ref 82–98)
PLATELET # BLD AUTO: 180 THOUSANDS/UL (ref 149–390)
PMV BLD AUTO: 10.8 FL (ref 8.9–12.7)
POTASSIUM SERPL-SCNC: 5 MMOL/L (ref 3.5–5.3)
RBC # BLD AUTO: 4.97 MILLION/UL (ref 3.88–5.62)
SODIUM SERPL-SCNC: 139 MMOL/L (ref 135–147)
WBC # BLD AUTO: 7.69 THOUSAND/UL (ref 4.31–10.16)

## 2022-05-21 PROCEDURE — 80048 BASIC METABOLIC PNL TOTAL CA: CPT

## 2022-05-21 PROCEDURE — 85027 COMPLETE CBC AUTOMATED: CPT

## 2022-05-21 PROCEDURE — 36415 COLL VENOUS BLD VENIPUNCTURE: CPT

## 2022-05-31 ENCOUNTER — TELEPHONE (OUTPATIENT)
Dept: GASTROENTEROLOGY | Facility: CLINIC | Age: 52
End: 2022-05-31

## 2022-06-09 ENCOUNTER — TELEPHONE (OUTPATIENT)
Dept: SURGERY | Facility: CLINIC | Age: 52
End: 2022-06-09

## 2022-06-09 NOTE — TELEPHONE ENCOUNTER
SPU called pt ate chicken this morning, rest of the day clear liquids, he does NOT want to cancel or r/s wants to come in    just FYI

## 2022-06-10 ENCOUNTER — ANESTHESIA (OUTPATIENT)
Dept: PERIOP | Facility: HOSPITAL | Age: 52
End: 2022-06-10

## 2022-06-10 ENCOUNTER — HOSPITAL ENCOUNTER (OUTPATIENT)
Dept: PERIOP | Facility: HOSPITAL | Age: 52
Setting detail: OUTPATIENT SURGERY
Discharge: HOME | End: 2022-06-10
Admitting: INTERNAL MEDICINE
Payer: COMMERCIAL

## 2022-06-10 ENCOUNTER — ANESTHESIA EVENT (OUTPATIENT)
Dept: PERIOP | Facility: HOSPITAL | Age: 52
End: 2022-06-10

## 2022-06-10 VITALS
DIASTOLIC BLOOD PRESSURE: 68 MMHG | TEMPERATURE: 97.5 F | RESPIRATION RATE: 18 BRPM | HEART RATE: 57 BPM | SYSTOLIC BLOOD PRESSURE: 105 MMHG | OXYGEN SATURATION: 98 %

## 2022-06-10 DIAGNOSIS — Z12.11 COLON CANCER SCREENING: ICD-10-CM

## 2022-06-10 DIAGNOSIS — K55.069 SUPERIOR MESENTERIC VEIN THROMBOSIS (HCC): ICD-10-CM

## 2022-06-10 LAB
GLUCOSE SERPL-MCNC: 152 MG/DL (ref 65–140)
GLUCOSE SERPL-MCNC: 177 MG/DL (ref 65–140)

## 2022-06-10 PROCEDURE — G0121 COLON CA SCRN NOT HI RSK IND: HCPCS | Performed by: INTERNAL MEDICINE

## 2022-06-10 PROCEDURE — 43239 EGD BIOPSY SINGLE/MULTIPLE: CPT | Performed by: INTERNAL MEDICINE

## 2022-06-10 PROCEDURE — 82948 REAGENT STRIP/BLOOD GLUCOSE: CPT

## 2022-06-10 PROCEDURE — 88305 TISSUE EXAM BY PATHOLOGIST: CPT | Performed by: PATHOLOGY

## 2022-06-10 PROCEDURE — 43251 EGD REMOVE LESION SNARE: CPT | Performed by: INTERNAL MEDICINE

## 2022-06-10 RX ORDER — SODIUM CHLORIDE, SODIUM LACTATE, POTASSIUM CHLORIDE, CALCIUM CHLORIDE 600; 310; 30; 20 MG/100ML; MG/100ML; MG/100ML; MG/100ML
INJECTION, SOLUTION INTRAVENOUS CONTINUOUS PRN
Status: DISCONTINUED | OUTPATIENT
Start: 2022-06-10 | End: 2022-06-10

## 2022-06-10 RX ORDER — PROPOFOL 10 MG/ML
INJECTION, EMULSION INTRAVENOUS AS NEEDED
Status: DISCONTINUED | OUTPATIENT
Start: 2022-06-10 | End: 2022-06-10

## 2022-06-10 RX ORDER — SODIUM CHLORIDE, SODIUM LACTATE, POTASSIUM CHLORIDE, CALCIUM CHLORIDE 600; 310; 30; 20 MG/100ML; MG/100ML; MG/100ML; MG/100ML
20 INJECTION, SOLUTION INTRAVENOUS CONTINUOUS
Status: DISCONTINUED | OUTPATIENT
Start: 2022-06-10 | End: 2022-06-14 | Stop reason: HOSPADM

## 2022-06-10 RX ORDER — PROPOFOL 10 MG/ML
INJECTION, EMULSION INTRAVENOUS CONTINUOUS PRN
Status: DISCONTINUED | OUTPATIENT
Start: 2022-06-10 | End: 2022-06-10

## 2022-06-10 RX ADMIN — PROPOFOL 140 MCG/KG/MIN: 10 INJECTION, EMULSION INTRAVENOUS at 07:45

## 2022-06-10 RX ADMIN — SODIUM CHLORIDE, SODIUM LACTATE, POTASSIUM CHLORIDE, AND CALCIUM CHLORIDE: .6; .31; .03; .02 INJECTION, SOLUTION INTRAVENOUS at 07:33

## 2022-06-10 RX ADMIN — PROPOFOL 150 MG: 10 INJECTION, EMULSION INTRAVENOUS at 07:37

## 2022-06-10 RX ADMIN — SODIUM CHLORIDE, SODIUM LACTATE, POTASSIUM CHLORIDE, AND CALCIUM CHLORIDE: .6; .31; .03; .02 INJECTION, SOLUTION INTRAVENOUS at 07:37

## 2022-06-10 NOTE — H&P
History and Physical -  [facility]  Izabella Ross 46 y o  male MRN: [id number]                  HPI: Izabella Ross is a 46y o  year old male who presents for abdominal pain and colon cancer screening      REVIEW OF SYSTEMS: Per the HPI, and otherwise unremarkable      Historical Information   Past Medical History:   Diagnosis Date    Asthma     Diabetes mellitus ([facility] [address] )     Hypertension     Kidney stone     Renal calculi     Last assessed: 1/11/18     Past Surgical History:   Procedure Laterality Date    CYSTOSCOPY W/ URETERAL STENT PLACEMENT  02/04/2014    CYSTOSCOPY W/ URETERAL STENT REMOVAL  02/11/2014    w/urteroscopy w/removal of calculus    KNEE SURGERY Right     STONE ANALYSIS (HISTORICAL)      TONSILECTOMY AND ADNOIDECTOMY      TONSILLECTOMY       Social History   Social History     Substance and Sexual Activity   Alcohol Use Not Currently    Alcohol/week: 0 0 standard drinks    Comment: Per [employer]: Occasional alcohol use     Social History     Substance and Sexual Activity   Drug Use No     Social History     Tobacco Use   Smoking Status Never Smoker   Smokeless Tobacco Current User     Family History   Problem Relation Age of Onset    Diabetes Mother         Mellitus    Hypertension Mother     Stroke Mother         Syndrome    Hypertension Father        Meds/Allergies       Current Outpatient Medications:     glipiZIDE (GLUCOTROL XL) 5 mg 24 hr tablet    losartan (COZAAR) 25 mg tablet    apixaban (Eliquis) 5 mg    Syringe, Disposable, (2-3CC SYRINGE) 3 ML MISC    Current Facility-Administered Medications:     aluminum-magnesium hydroxide-simethicone (MYLANTA) oral suspension 30 mL, 30 mL, Oral, Q4H PRN    Lidocaine Viscous HCl (XYLOCAINE) 2 % mucosal solution 15 mL, 15 mL, Swish & Spit, 4x Daily PRN    Allergies   Allergen Reactions    Penicillins GI Intolerance       Objective     /65   Pulse 59   Temp 98 6 °F (37 °C) (Tympanic)   Resp 18   SpO2 98% PHYSICAL EXAM    Gen: NAD  Head: NCAT  CV: RRR  CHEST: Clear  ABD: soft, NT/ND  EXT: no edema      ASSESSMENT/PLAN:  This is a 46y o  year old male here for endoscopy and colonoscopy, and he is stable and optimized for his procedure

## 2022-06-10 NOTE — ANESTHESIA POSTPROCEDURE EVALUATION
Post-Op Assessment Note    CV Status:  Stable  Pain Score: 0    Pain management: adequate     Mental Status:  Alert and awake   Hydration Status:  Euvolemic   PONV Controlled:  Controlled   Airway Patency:  Patent      Post Op Vitals Reviewed: Yes            No complications documented      BP   136/78   Temp   97   Pulse  78   Resp  12    SpO2   97

## 2022-06-10 NOTE — ANESTHESIA PREPROCEDURE EVALUATION
Procedure:  COLONOSCOPY  EGD    Relevant Problems   CARDIO   (+) Hyperlipidemia   (+) Hypertension   (+) Superior mesenteric vein thrombosis (HCC)      ENDO   (+) Diabetes mellitus type 2 in obese ([address] )      /RENAL   (+) Nephrolithiasis      Other   (+) Chronic anticoagulation   (+) Klinefelter's syndrome   (+) Morbid obesity with body mass index (BMI) of 40 0 or higher (HCC)      Eliquis last 6/8/22    Physical Exam    Airway    Mallampati score: III  TM Distance: >3 FB  Neck ROM: full     Dental       Cardiovascular      Pulmonary      Other Findings        Anesthesia Plan  ASA Score- 3     Anesthesia Type- IV sedation with anesthesia with ASA Monitors  Additional Monitors:   Airway Plan:     Comment: Recent labs personally reviewed:  Lab Results       Component                Value               Date                       WBC                      7 69                05/21/2022                 HGB                      14 7                05/21/2022                 PLT                      180                 05/21/2022            Lab Results       Component                Value               Date                       K                        5 0                 05/21/2022                 BUN                      16                  05/21/2022                 CREATININE               0 95                05/21/2022            Lab Results       Component                Value               Date                       PTT                      65 (H)              02/05/2022             Lab Results       Component                Value               Date                       INR                      1 09                02/04/2022              Blood type     I, Baldemar Mena MD, have personally seen and evaluated the patient prior to anesthetic care  I have reviewed the pre-anesthetic record, medical history, allergies, medications and any other medical records if appropriate to the anesthetic care    If a CRNA is involved in the case, I have reviewed the CRNA assessment, if present, and agree  Patient consented for IV Sedation, general anesthesia as back up  Discussed risks of aspiration, IV infiltration, indications for conversion to general anesthesia  All questions and concerns addressed          Plan Factors-Exercise tolerance (METS): >4 METS  Chart reviewed  Imaging results reviewed  Existing labs reviewed  Patient summary reviewed  Patient is not a current smoker  Patient did not smoke on day of surgery  Obstructive sleep apnea risk education given perioperatively  Induction- intravenous  Postoperative Plan-     Informed Consent- Anesthetic plan and risks discussed with patient  I personally reviewed this patient with the CRNA  Discussed and agreed on the Anesthesia Plan with the CRNA  Naomie Elizabeth

## 2022-06-23 ENCOUNTER — OFFICE VISIT (OUTPATIENT)
Dept: HEMATOLOGY ONCOLOGY | Facility: CLINIC | Age: 52
End: 2022-06-23
Payer: COMMERCIAL

## 2022-06-23 VITALS
TEMPERATURE: 96.6 F | SYSTOLIC BLOOD PRESSURE: 126 MMHG | OXYGEN SATURATION: 98 % | HEIGHT: 72 IN | WEIGHT: 315 LBS | DIASTOLIC BLOOD PRESSURE: 68 MMHG | BODY MASS INDEX: 42.66 KG/M2 | HEART RATE: 68 BPM | RESPIRATION RATE: 16 BRPM

## 2022-06-23 DIAGNOSIS — Z79.01 CHRONIC ANTICOAGULATION: ICD-10-CM

## 2022-06-23 DIAGNOSIS — K55.069 SUPERIOR MESENTERIC VEIN THROMBOSIS (HCC): Primary | ICD-10-CM

## 2022-06-23 PROCEDURE — 99214 OFFICE O/P EST MOD 30 MIN: CPT | Performed by: INTERNAL MEDICINE

## 2022-06-23 NOTE — PROGRESS NOTES
[address] [facility] - Hematology & Medical Oncology  Outpatient Visit Encounter Note      Efrain Soni 46 y o  male DOB1970 [id number] Date:  6/23/2022      Amanda Watts is a 46 y o  here for new consultation with me today  The patient is referred by Laura Diamond MD and the reason for consultation is K55 069 (ICD-10-CM) - Superior mesenteric vein thrombosis  In review of the chart and talking with the patient, he got ER care recently due to abdominal pain and was diagnosed with extensive SMV thrombosis  He was seen by GI and then started on anti-coagulation  He is taking Eliquis and has no complaints  He says that his acute issues have resolved since starting Eliquis  Denies personal cancer or clotting history  Denies unintentional weight loss, alcohol abuse history, tobacco use currently  No f/c/n/v/cp/ap/sob/cough  THIS VISIT    Denies any f/c/n/v/cp/ap/sob/cough  No bleeding issues  I have reviewed the relevant past medical, surgical, social and family history  I have also reviewed allergies and medications for this patient  Review of Systems  Review of Systems   All other systems reviewed and are negative  OBJECTIVE     Physical Exam  Vitals:    06/23/22 0809   BP: 126/68   BP Location: Left arm   Patient Position: Sitting   Cuff Size: Large   Pulse: 68   Resp: 16   Temp: (!) 96 6 °F (35 9 °C)   TempSrc: Temporal   SpO2: 98%   Weight: (!) 143 kg (315 lb)   Height: 6' (1 829 m)       Physical Exam  Vitals reviewed  Constitutional:       General: He is not in acute distress  Appearance: He is obese  He is not toxic-appearing  HENT:      Head: Normocephalic  Cardiovascular:      Rate and Rhythm: Normal rate  Pulmonary:      Effort: Pulmonary effort is normal  No respiratory distress  Abdominal:      General: There is no distension  Musculoskeletal:         General: No swelling  Normal range of motion        Cervical back: Normal range of motion and neck supple  Neurological:      General: No focal deficit present  Mental Status: He is alert and oriented to person, place, and time  Mental status is at baseline  Imaging  Relevant imaging reviewed in chart    Labs  Relevant labs reviewed in chart   ASSESSMENT & PLAN      Diagnosis ICD-10-CM Associated Orders   1  Superior mesenteric vein thrombosis (HCC)  K55 069 CBC     Basic metabolic panel   2  Chronic anticoagulation  Z79 01 CBC     Basic metabolic panel         46 y o  male diagnosed with apparent unprovoked SMV thrombosis on 02/04/2022  · Discussion/Plan/Labs  · Diagnostic thrombophilia work-up including JAK2 reflex test was negative  · Recommend continued GI follow-up  · Recommend Eliquis for indefinite anti-coagulation due to unprovoked nature of VTE  · Labs with return visit  Follow Up   6 months      All questions were answered to the patient's satisfaction during this encounter  They appreciated and thanked me for spending time with them  The patient knows the contact information for our office and know to reach out for any relevant concerns related to this encounter  For all other listed problems and medical diagnosis in his chart - they are managed by PCP and/or other specialists, which patient acknowledges  Dr Ravi Ball MD  Hematology & Medical Oncology

## 2022-06-27 ENCOUNTER — OFFICE VISIT (OUTPATIENT)
Dept: GASTROENTEROLOGY | Facility: CLINIC | Age: 52
End: 2022-06-27
Payer: COMMERCIAL

## 2022-06-27 VITALS
DIASTOLIC BLOOD PRESSURE: 70 MMHG | BODY MASS INDEX: 41.59 KG/M2 | WEIGHT: 313.8 LBS | RESPIRATION RATE: 16 BRPM | SYSTOLIC BLOOD PRESSURE: 130 MMHG | HEART RATE: 62 BPM | OXYGEN SATURATION: 98 % | TEMPERATURE: 97 F | HEIGHT: 73 IN

## 2022-06-27 DIAGNOSIS — I10 ESSENTIAL HYPERTENSION: ICD-10-CM

## 2022-06-27 DIAGNOSIS — K55.069 SUPERIOR MESENTERIC VEIN THROMBOSIS (HCC): ICD-10-CM

## 2022-06-27 DIAGNOSIS — E11.9 TYPE 2 DIABETES MELLITUS WITHOUT COMPLICATION, WITHOUT LONG-TERM CURRENT USE OF INSULIN (HCC): ICD-10-CM

## 2022-06-27 DIAGNOSIS — K55.069 SUPERIOR MESENTERIC VEIN THROMBOSIS (HCC): Primary | ICD-10-CM

## 2022-06-27 PROCEDURE — 1036F TOBACCO NON-USER: CPT | Performed by: INTERNAL MEDICINE

## 2022-06-27 PROCEDURE — 3008F BODY MASS INDEX DOCD: CPT | Performed by: INTERNAL MEDICINE

## 2022-06-27 PROCEDURE — 3078F DIAST BP <80 MM HG: CPT | Performed by: INTERNAL MEDICINE

## 2022-06-27 PROCEDURE — 99213 OFFICE O/P EST LOW 20 MIN: CPT | Performed by: INTERNAL MEDICINE

## 2022-06-27 PROCEDURE — 4010F ACE/ARB THERAPY RXD/TAKEN: CPT | Performed by: INTERNAL MEDICINE

## 2022-06-27 PROCEDURE — 3075F SYST BP GE 130 - 139MM HG: CPT | Performed by: INTERNAL MEDICINE

## 2022-06-27 RX ORDER — GLIPIZIDE 5 MG/1
5 TABLET, FILM COATED, EXTENDED RELEASE ORAL DAILY
Qty: 90 TABLET | Refills: 0 | Status: SHIPPED | OUTPATIENT
Start: 2022-06-27 | End: 2022-08-04 | Stop reason: SDUPTHER

## 2022-06-27 RX ORDER — LOSARTAN POTASSIUM 25 MG/1
25 TABLET ORAL DAILY
Qty: 90 TABLET | Refills: 0 | Status: SHIPPED | OUTPATIENT
Start: 2022-06-27 | End: 2022-06-27 | Stop reason: SDUPTHER

## 2022-06-27 RX ORDER — LOSARTAN POTASSIUM 25 MG/1
25 TABLET ORAL DAILY
Qty: 90 TABLET | Refills: 0 | Status: SHIPPED | OUTPATIENT
Start: 2022-06-27 | End: 2022-08-04 | Stop reason: SDUPTHER

## 2022-06-27 RX ORDER — GLIPIZIDE 5 MG/1
5 TABLET, FILM COATED, EXTENDED RELEASE ORAL DAILY
Qty: 90 TABLET | Refills: 3 | Status: SHIPPED | OUTPATIENT
Start: 2022-06-27 | End: 2022-06-27 | Stop reason: SDUPTHER

## 2022-06-27 NOTE — PROGRESS NOTES
Elida Ribeiro [facility] - Outpatient Follow-up Note  Serena Landeros 46 y o  male MRN: [id number]  Encounter: [id number]          ASSESSMENT AND PLAN:      1  Superior mesenteric vein thrombosis ([facility] )    This is a 51-year-old white male with superior mesenteric thrombosis of undetermined etiology  The patient is going to be staying on anticoagulation for the proceed with future  Repeat colonoscopy should be done in 10 years  I will await the results of the stomach biopsies but they probably will not change his treatment  Thank you so much for referring this patient     ______________________________________________________________________    Tarah Garcia returns for follow-up after his endoscopy and colonoscopy were negative for any significant findings  Pathology report on biopsies are still pending  He is currently asymptomatic  REVIEW OF SYSTEMS IS OTHERWISE NEGATIVE        Historical Information   Past Medical History:   Diagnosis Date    Asthma     Diabetes mellitus ([facility] )     Hypertension     Kidney stone     Renal calculi     Last assessed: 1/11/18     Past Surgical History:   Procedure Laterality Date    CYSTOSCOPY W/ URETERAL STENT PLACEMENT  02/04/2014    CYSTOSCOPY W/ URETERAL STENT REMOVAL  02/11/2014    w/urteroscopy w/removal of calculus    KNEE SURGERY Right     STONE ANALYSIS (HISTORICAL)      TONSILECTOMY AND ADNOIDECTOMY      TONSILLECTOMY       Social History   Social History     Substance and Sexual Activity   Alcohol Use Not Currently    Alcohol/week: 0 0 standard drinks    Comment: Per [employer]: Occasional alcohol use     Social History     Substance and Sexual Activity   Drug Use No     Social History     Tobacco Use   Smoking Status Never Smoker   Smokeless Tobacco Current User     Family History   Problem Relation Age of Onset    Diabetes Mother         Mellitus    Hypertension Mother     Stroke Mother         Syndrome    Hypertension Father Meds/Allergies       Current Outpatient Medications:     apixaban (Eliquis) 5 mg    glipiZIDE (GLUCOTROL XL) 5 mg 24 hr tablet    losartan (COZAAR) 25 mg tablet    Syringe, Disposable, (2-3CC SYRINGE) 3 ML MISC    Current Facility-Administered Medications:     aluminum-magnesium hydroxide-simethicone (MYLANTA) oral suspension 30 mL, 30 mL, Oral, Q4H PRN    Lidocaine Viscous HCl (XYLOCAINE) 2 % mucosal solution 15 mL, 15 mL, Swish & Spit, 4x Daily PRN    Allergies   Allergen Reactions    Cat Hair Extract Shortness Of Breath, Itching and Allergic Rhinitis    Penicillins GI Intolerance     Exacerbates symptoms            Objective     Blood pressure 130/70, pulse 62, temperature (!) 97 °F (36 1 °C), temperature source Tympanic, resp  rate 16, height 6' 1" (1 854 m), weight (!) 142 kg (313 lb 12 8 oz), SpO2 98 %  Body mass index is 41 4 kg/m²  PHYSICAL EXAM:      General Appearance:   Alert, cooperative, no distress   HEENT:   Normocephalic, atraumatic, anicteric  Neck:  Supple, symmetrical, trachea midline   Lungs:   Clear to auscultation bilaterally; no rales, rhonchi or wheezing; respirations unlabored    Heart[de-identified]   Regular rate and rhythm; no murmur, rub, or gallop  Abdomen:   Soft, non-tender, non-distended; normal bowel sounds; no masses, no organomegaly    Genitalia:   Deferred    Rectal:   Deferred    Extremities:  No cyanosis, clubbing or edema    Pulses:  2+ and symmetric    Skin:  No jaundice, rashes, or lesions    Lymph nodes:  No palpable cervical lymphadenopathy        Lab Results:   No visits with results within 1 Day(s) from this visit     Latest known visit with results is:   Hospital Outpatient Visit on 06/10/2022   Component Date Value    POC Glucose 06/10/2022 177 (A)    POC Glucose 06/10/2022 152 (A)         Radiology Results:   EGD    Result Date: 6/10/2022  Narrative: [facility] Operating Room [address] DATE OF SERVICE: 6/10/22 PHYSICIAN(S): Attending: Miles Snell MD Fellow: No Staff Documented INDICATION: Superior mesenteric vein thrombosis ([address] ) POST-OP DIAGNOSIS: See the impression below  PREPROCEDURE: Informed consent was obtained for the procedure, including sedation  Risks of perforation, hemorrhage, adverse drug reaction and aspiration were discussed  The patient was placed in the left lateral decubitus position  Patient was explained about the risks and benefits of the procedure  Risks including but not limited to bleeding, infection, and perforation were explained in detail  Also explained about less than 100% sensitivity with the exam and other alternatives  DETAILS OF PROCEDURE: Patient was taken to the procedure room where a time out was performed to confirm correct patient and correct procedure  The patient underwent monitored anesthesia care, which was administered by an anesthesia professional  The patient's blood pressure, heart rate, level of consciousness, respirations and oxygen were monitored throughout the procedure  The scope was advanced to the second part of the duodenum  Retroflexion was performed in the fundus  The patient experienced no blood loss  The procedure was not difficult  The patient tolerated the procedure well  There were no apparent complications   ANESTHESIA INFORMATION: ASA: III Anesthesia Type: IV Sedation with Anesthesia MEDICATIONS: No administrations occurring from 0652 to 0747 on 06/10/22 FINDINGS: The duodenum appeared normal  Mild erythematous mucosa in the antrum; performed cold forceps biopsy Two sessile polyps measuring smaller than 5 mm in the cardia; completely removed en bloc by cold snare and retrieved specimen The esophagus appeared normal  SPECIMENS: ID Type Source Tests Collected by Time Destination 1 : cold snare bx polyp x1-cardia Tissue Stomach TISSUE EXAM Miles Snell MD 6/10/2022  7:45 AM  2 : cold fcp bx r/o h pylori-antrum Tissue Stomach TISSUE EXAM Hunt Finely MD Mo 6/10/2022  7:46 AM      Impression: The duodenum appeared normal  Mild erythematous mucosa in the antrum; performed cold forceps biopsy Two sessile polyps measuring smaller than 5 mm in the cardia; completely removed en bloc by cold snare and retrieved specimen The esophagus appeared normal  RECOMMENDATION: Await pathology results   Vidya Lee MD     Colonoscopy    Result Date: 6/10/2022  Narrative: [facility] Operating Room [phone] DATE OF SERVICE: 6/10/22 PHYSICIAN(S): Attending: Vidya Lee MD Fellow: No Staff Documented INDICATION: Colon cancer screening POST-OP DIAGNOSIS: See the impression below  HISTORY: Prior colonoscopy: No prior colonoscopy  BOWEL PREPARATION: Miralax/Dulcolax PREPROCEDURE: Informed consent was obtained for the procedure, including sedation  Risks including but not limited to bleeding, infection, perforation, adverse drug reaction and aspiration were explained in detail  Also explained about less than 100% sensitivity with the exam and other alternatives  The patient was placed in the left lateral decubitus position  DETAILS OF PROCEDURE: Patient was taken to the procedure room where a time out was performed to confirm correct patient and correct procedure  The patient underwent monitored anesthesia care, which was administered by an anesthesia professional  The patient's blood pressure, heart rate, level of consciousness, oxygen and respirations were monitored throughout the procedure  A digital rectal exam was performed  The scope was introduced through the anus and advanced to the cecum  Retroflexion was performed in the rectum  The quality of bowel preparation was evaluated using the [facility] Bowel Preparation Scale with scores of: right colon = 2, transverse colon = 2, left colon = 2  The total BBPS score was 6  Bowel prep was adequate  The patient experienced no blood loss  The procedure was not difficult   The patient tolerated the procedure well  There were no apparent complications   ANESTHESIA INFORMATION: ASA: III Anesthesia Type: IV Sedation with Anesthesia MEDICATIONS: No administrations occurring from 0652 to 0802 on 06/10/22 FINDINGS: All observed locations appeared normal  EVENTS: Procedure Events Event Event Time ENDO SCOPE OUT TIME 6/10/2022  7:45 AM ENDO CECUM REACHED 6/10/2022  7:51 AM ENDO SCOPE OUT TIME 6/10/2022  8:00 AM SPECIMENS: ID Type Source Tests Collected by Time Destination 1 : cold snare bx polyp x1-cardia Tissue Stomach TISSUE EXAM Titi Benoit MD 6/10/2022  7:45 AM  2 : cold fcp bx r/o h pylori-antrum Tissue Stomach TISSUE EXAM Titi Benoit MD 6/10/2022  7:46 AM  EQUIPMENT: Colonoscope -CF [id number]     Impression: All observed locations appeared normal  RECOMMENDATION: Repeat screening colonoscopy in 10 years   Titi Benoit MD

## 2022-07-06 ENCOUNTER — VBI (OUTPATIENT)
Dept: ADMINISTRATIVE | Facility: OTHER | Age: 52
End: 2022-07-06

## 2022-08-04 DIAGNOSIS — I10 ESSENTIAL HYPERTENSION: ICD-10-CM

## 2022-08-04 DIAGNOSIS — K55.069 SUPERIOR MESENTERIC VEIN THROMBOSIS (HCC): ICD-10-CM

## 2022-08-04 DIAGNOSIS — E11.9 TYPE 2 DIABETES MELLITUS WITHOUT COMPLICATION, WITHOUT LONG-TERM CURRENT USE OF INSULIN (HCC): ICD-10-CM

## 2022-08-04 RX ORDER — LOSARTAN POTASSIUM 25 MG/1
25 TABLET ORAL DAILY
Qty: 90 TABLET | Refills: 0 | Status: SHIPPED | OUTPATIENT
Start: 2022-08-04 | End: 2022-09-01 | Stop reason: SDUPTHER

## 2022-08-04 RX ORDER — GLIPIZIDE 5 MG/1
5 TABLET, FILM COATED, EXTENDED RELEASE ORAL DAILY
Qty: 90 TABLET | Refills: 0 | Status: SHIPPED | OUTPATIENT
Start: 2022-08-04 | End: 2022-09-01 | Stop reason: SDUPTHER

## 2022-08-08 ENCOUNTER — TELEPHONE (OUTPATIENT)
Dept: FAMILY MEDICINE CLINIC | Facility: CLINIC | Age: 52
End: 2022-08-08

## 2022-08-24 ENCOUNTER — TELEPHONE (OUTPATIENT)
Dept: FAMILY MEDICINE CLINIC | Facility: CLINIC | Age: 52
End: 2022-08-24

## 2022-08-24 NOTE — TELEPHONE ENCOUNTER
An active prior Rose Marie Silva is already on file for Eliquis with an expiration date of 02/04/2023

## 2022-09-01 DIAGNOSIS — E11.9 TYPE 2 DIABETES MELLITUS WITHOUT COMPLICATION, WITHOUT LONG-TERM CURRENT USE OF INSULIN (HCC): ICD-10-CM

## 2022-09-01 DIAGNOSIS — I10 ESSENTIAL HYPERTENSION: ICD-10-CM

## 2022-09-01 DIAGNOSIS — K55.069 SUPERIOR MESENTERIC VEIN THROMBOSIS (HCC): ICD-10-CM

## 2022-09-01 RX ORDER — GLIPIZIDE 5 MG/1
5 TABLET, FILM COATED, EXTENDED RELEASE ORAL DAILY
Qty: 90 TABLET | Refills: 0 | Status: SHIPPED | OUTPATIENT
Start: 2022-09-01 | End: 2022-09-07 | Stop reason: SDUPTHER

## 2022-09-01 RX ORDER — LOSARTAN POTASSIUM 25 MG/1
25 TABLET ORAL DAILY
Qty: 90 TABLET | Refills: 0 | Status: SHIPPED | OUTPATIENT
Start: 2022-09-01 | End: 2022-09-07 | Stop reason: SDUPTHER

## 2022-09-07 DIAGNOSIS — K55.069 SUPERIOR MESENTERIC VEIN THROMBOSIS (HCC): ICD-10-CM

## 2022-09-07 DIAGNOSIS — E29.1 HYPOGONADISM, MALE: ICD-10-CM

## 2022-09-07 DIAGNOSIS — E11.9 TYPE 2 DIABETES MELLITUS WITHOUT COMPLICATION, WITHOUT LONG-TERM CURRENT USE OF INSULIN (HCC): ICD-10-CM

## 2022-09-07 DIAGNOSIS — I10 ESSENTIAL HYPERTENSION: ICD-10-CM

## 2022-09-07 DIAGNOSIS — Q98.4 KLINEFELTER'S SYNDROME: ICD-10-CM

## 2022-09-07 RX ORDER — GLIPIZIDE 5 MG/1
5 TABLET, FILM COATED, EXTENDED RELEASE ORAL DAILY
Qty: 90 TABLET | Refills: 0 | Status: SHIPPED | OUTPATIENT
Start: 2022-09-07 | End: 2022-10-12 | Stop reason: SDUPTHER

## 2022-09-07 RX ORDER — LOSARTAN POTASSIUM 25 MG/1
25 TABLET ORAL DAILY
Qty: 90 TABLET | Refills: 0 | Status: SHIPPED | OUTPATIENT
Start: 2022-09-07 | End: 2022-09-19 | Stop reason: SDUPTHER

## 2022-09-07 RX ORDER — SYRINGE, DISPOSABLE, 3 ML
SYRINGE, EMPTY DISPOSABLE MISCELLANEOUS
Qty: 25 EACH | Refills: 0 | Status: SHIPPED | OUTPATIENT
Start: 2022-09-07

## 2022-09-19 DIAGNOSIS — I10 ESSENTIAL HYPERTENSION: ICD-10-CM

## 2022-09-19 RX ORDER — LOSARTAN POTASSIUM 25 MG/1
25 TABLET ORAL DAILY
Qty: 90 TABLET | Refills: 0 | Status: SHIPPED | OUTPATIENT
Start: 2022-09-19

## 2022-10-12 DIAGNOSIS — K55.069 SUPERIOR MESENTERIC VEIN THROMBOSIS (HCC): ICD-10-CM

## 2022-10-12 DIAGNOSIS — E11.9 TYPE 2 DIABETES MELLITUS WITHOUT COMPLICATION, WITHOUT LONG-TERM CURRENT USE OF INSULIN (HCC): ICD-10-CM

## 2022-10-12 RX ORDER — GLIPIZIDE 5 MG/1
5 TABLET, FILM COATED, EXTENDED RELEASE ORAL DAILY
Qty: 90 TABLET | Refills: 0 | Status: SHIPPED | OUTPATIENT
Start: 2022-10-12

## 2022-11-24 DIAGNOSIS — K55.069 SUPERIOR MESENTERIC VEIN THROMBOSIS (HCC): ICD-10-CM

## 2022-12-03 ENCOUNTER — APPOINTMENT (OUTPATIENT)
Dept: LAB | Facility: HOSPITAL | Age: 52
End: 2022-12-03

## 2022-12-03 DIAGNOSIS — Z79.01 CHRONIC ANTICOAGULATION: ICD-10-CM

## 2022-12-03 DIAGNOSIS — K55.069 SUPERIOR MESENTERIC VEIN THROMBOSIS (HCC): ICD-10-CM

## 2022-12-03 LAB
ANION GAP SERPL CALCULATED.3IONS-SCNC: 7 MMOL/L (ref 4–13)
BUN SERPL-MCNC: 17 MG/DL (ref 5–25)
CALCIUM SERPL-MCNC: 9.2 MG/DL (ref 8.4–10.2)
CHLORIDE SERPL-SCNC: 104 MMOL/L (ref 96–108)
CO2 SERPL-SCNC: 25 MMOL/L (ref 21–32)
CREAT SERPL-MCNC: 0.78 MG/DL (ref 0.6–1.3)
ERYTHROCYTE [DISTWIDTH] IN BLOOD BY AUTOMATED COUNT: 12.4 % (ref 11.6–15.1)
GFR SERPL CREATININE-BSD FRML MDRD: 103 ML/MIN/1.73SQ M
GLUCOSE P FAST SERPL-MCNC: 247 MG/DL (ref 65–99)
HCT VFR BLD AUTO: 41.9 % (ref 36.5–49.3)
HGB BLD-MCNC: 14 G/DL (ref 12–17)
MCH RBC QN AUTO: 28.9 PG (ref 26.8–34.3)
MCHC RBC AUTO-ENTMCNC: 33.4 G/DL (ref 31.4–37.4)
MCV RBC AUTO: 86 FL (ref 82–98)
PLATELET # BLD AUTO: 172 THOUSANDS/UL (ref 149–390)
PMV BLD AUTO: 11.2 FL (ref 8.9–12.7)
POTASSIUM SERPL-SCNC: 4.2 MMOL/L (ref 3.5–5.3)
RBC # BLD AUTO: 4.85 MILLION/UL (ref 3.88–5.62)
SODIUM SERPL-SCNC: 136 MMOL/L (ref 135–147)
WBC # BLD AUTO: 6.68 THOUSAND/UL (ref 4.31–10.16)

## 2022-12-13 DIAGNOSIS — I10 ESSENTIAL HYPERTENSION: ICD-10-CM

## 2022-12-13 RX ORDER — LOSARTAN POTASSIUM 25 MG/1
25 TABLET ORAL DAILY
Qty: 90 TABLET | Refills: 0 | Status: SHIPPED | OUTPATIENT
Start: 2022-12-13

## 2022-12-23 ENCOUNTER — OFFICE VISIT (OUTPATIENT)
Dept: HEMATOLOGY ONCOLOGY | Facility: CLINIC | Age: 52
End: 2022-12-23

## 2022-12-23 VITALS
HEART RATE: 63 BPM | DIASTOLIC BLOOD PRESSURE: 80 MMHG | BODY MASS INDEX: 41.75 KG/M2 | RESPIRATION RATE: 18 BRPM | SYSTOLIC BLOOD PRESSURE: 128 MMHG | HEIGHT: 73 IN | OXYGEN SATURATION: 97 % | TEMPERATURE: 96.9 F | WEIGHT: 315 LBS

## 2022-12-23 DIAGNOSIS — K55.069 SUPERIOR MESENTERIC VEIN THROMBOSIS (HCC): Primary | ICD-10-CM

## 2022-12-23 NOTE — PROGRESS NOTES
[address] [facility] - Hematology & Medical Oncology  Outpatient Visit Encounter Note      Feliberto Grayson 46 y o  male [id number] Date:  12/23/2022      Javan Mckeon is a 46 y o  here for new consultation with me today  The patient is referred by Duncan Gregory MD and the reason for consultation is K55 069 (ICD-10-CM) - Superior mesenteric vein thrombosis  In review of the chart and talking with the patient, he got ER care recently due to abdominal pain and was diagnosed with extensive SMV thrombosis  He was seen by GI and then started on anti-coagulation  He is taking Eliquis and has no complaints  He says that his acute issues have resolved since starting Eliquis  Denies personal cancer or clotting history  Denies unintentional weight loss, alcohol abuse history, tobacco use currently  No f/c/n/v/cp/ap/sob/cough  THIS VISIT    Denies any f/c/n/v/cp/ap/sob/cough  No bleeding issues  Taking his eliquis without issues  Here with wife  I have reviewed the relevant past medical, surgical, social and family history  I have also reviewed allergies and medications for this patient  Review of Systems  Review of Systems   All other systems reviewed and are negative  OBJECTIVE     Physical Exam  Vitals:    12/23/22 0753   BP: 128/80   BP Location: Left arm   Pulse: 63   Resp: 18   Temp: (!) 96 9 °F (36 1 °C)   TempSrc: Tympanic   SpO2: 97%   Weight: (!) 147 kg (323 lb)   Height: 6' 1" (1 854 m)       Physical Exam  Vitals reviewed  Constitutional:       General: He is not in acute distress  Appearance: He is obese  He is not toxic-appearing  HENT:      Head: Normocephalic  Eyes:      General: No scleral icterus  Cardiovascular:      Rate and Rhythm: Normal rate  Pulmonary:      Effort: Pulmonary effort is normal  No respiratory distress  Abdominal:      General: There is no distension     Musculoskeletal:         General: No swelling  Normal range of motion  Cervical back: Normal range of motion  Neurological:      General: No focal deficit present  Mental Status: He is alert and oriented to person, place, and time  Mental status is at baseline  Imaging  Relevant imaging reviewed in chart    Labs  Relevant labs reviewed in chart   ASSESSMENT & PLAN          Diagnosis ICD-10-CM Associated Orders   1  Superior mesenteric vein thrombosis (HCC)  K55 069 CBC     Basic metabolic panel            46 y o  male diagnosed with apparent unprovoked SMV thrombosis on 02/04/2022  · Discussion/Plan/Labs  · Diagnostic thrombophilia work-up including JAK2 reflex test was negative  · Recommend continued GI follow-up  · Recommend Eliquis for indefinite anti-coagulation due to unprovoked nature of VTE  · Labs with return visit  Samples given  · Form for drug plan to be filled out  Follow Up  • 6 months      All questions were answered to the patient's satisfaction during this encounter  They appreciated and thanked me for spending time with them  The patient knows the contact information for our office and know to reach out for any relevant concerns related to this encounter  For all other listed problems and medical diagnosis in his chart - they are managed by PCP and/or other specialists, which patient acknowledges  Dr Festus Funes MD  Hematology & Medical Oncology

## 2023-01-03 DIAGNOSIS — K55.069 SUPERIOR MESENTERIC VEIN THROMBOSIS (HCC): ICD-10-CM

## 2023-01-03 DIAGNOSIS — E11.9 TYPE 2 DIABETES MELLITUS WITHOUT COMPLICATION, WITHOUT LONG-TERM CURRENT USE OF INSULIN (HCC): ICD-10-CM

## 2023-01-03 RX ORDER — GLIPIZIDE 5 MG/1
5 TABLET, FILM COATED, EXTENDED RELEASE ORAL DAILY
Qty: 90 TABLET | Refills: 0 | Status: SHIPPED | OUTPATIENT
Start: 2023-01-03

## 2023-01-05 ENCOUNTER — TELEPHONE (OUTPATIENT)
Dept: FAMILY MEDICINE CLINIC | Facility: CLINIC | Age: 53
End: 2023-01-05

## 2023-01-05 DIAGNOSIS — K55.069 SUPERIOR MESENTERIC VEIN THROMBOSIS (HCC): Primary | ICD-10-CM

## 2023-01-05 NOTE — TELEPHONE ENCOUNTER
Received         He takes the medication Eliquis and he wants to know if there's something he can get, like a card or something that he can get the cheaper, because I guess it's over $500 00 for the prescription and you can't afford that

## 2023-01-06 ENCOUNTER — TELEPHONE (OUTPATIENT)
Dept: FAMILY MEDICINE CLINIC | Facility: CLINIC | Age: 53
End: 2023-01-06

## 2023-01-06 NOTE — TELEPHONE ENCOUNTER
[address] Pharmacist    Communication with patient: Yes, Phone call with patient    Reason for documentation: Patient called office statin Eliquis was $500/month  Findings: Per chart review this happened last year before he met his deductible  We were able to get him a coupon card to use a the pharmacy at that time  Verified that coupon card is still active  Called pharmacy and they originally forgot to run the coupon card on his Eliquis  When they put it through the card, the cost went down to $10  Called and made patient aware      Pharmacist Tracking Tool  Reason For Outreach: Embedded Pharmacist  Demographics:  Intervention Method: Phone  Type of Intervention: Follow-Up  Topics Addressed: Anticoagulation  Pharmacologic Interventions: N/A  Non-Pharmacologic Interventions: Cost  Time:  Direct Patient Care: 5 mins  Care Coordination: 15 mins  Recommendation Recipient: N/A  Outcome: N/A

## 2023-01-27 ENCOUNTER — RA CDI HCC (OUTPATIENT)
Dept: OTHER | Facility: HOSPITAL | Age: 53
End: 2023-01-27

## 2023-01-27 NOTE — PROGRESS NOTES
[address]  coding opportunities       Chart reviewed, no opportunity found: CHART REVIEWED, NO OPPORTUNITY FOUND        Patients Insurance     Medicare Insurance: Medicare

## 2023-02-03 ENCOUNTER — TELEPHONE (OUTPATIENT)
Dept: FAMILY MEDICINE CLINIC | Facility: CLINIC | Age: 53
End: 2023-02-03

## 2023-02-03 DIAGNOSIS — K55.069 SUPERIOR MESENTERIC VEIN THROMBOSIS (HCC): ICD-10-CM

## 2023-02-22 ENCOUNTER — TELEPHONE (OUTPATIENT)
Dept: FAMILY MEDICINE CLINIC | Facility: CLINIC | Age: 53
End: 2023-02-22

## 2023-02-22 DIAGNOSIS — R69 SICK: Primary | ICD-10-CM

## 2023-02-22 RX ORDER — AZITHROMYCIN 250 MG/1
TABLET, FILM COATED ORAL
Qty: 6 TABLET | Refills: 0 | Status: SHIPPED | OUTPATIENT
Start: 2023-02-22 | End: 2023-02-27

## 2023-02-22 NOTE — TELEPHONE ENCOUNTER
Vm from pt    Asking for rx sent to [address] [place]  If you can prescribe him something headache, nose scratchy, throat, coughing

## 2023-02-28 DIAGNOSIS — K55.069 SUPERIOR MESENTERIC VEIN THROMBOSIS (HCC): ICD-10-CM

## 2023-02-28 DIAGNOSIS — E11.9 TYPE 2 DIABETES MELLITUS WITHOUT COMPLICATION, WITHOUT LONG-TERM CURRENT USE OF INSULIN (HCC): ICD-10-CM

## 2023-03-01 RX ORDER — GLIPIZIDE 5 MG/1
5 TABLET, FILM COATED, EXTENDED RELEASE ORAL DAILY
Qty: 90 TABLET | Refills: 0 | Status: SHIPPED | OUTPATIENT
Start: 2023-03-01 | End: 2023-03-07 | Stop reason: SDUPTHER

## 2023-03-07 DIAGNOSIS — K55.069 SUPERIOR MESENTERIC VEIN THROMBOSIS (HCC): ICD-10-CM

## 2023-03-07 DIAGNOSIS — E11.9 TYPE 2 DIABETES MELLITUS WITHOUT COMPLICATION, WITHOUT LONG-TERM CURRENT USE OF INSULIN (HCC): ICD-10-CM

## 2023-03-07 DIAGNOSIS — I10 ESSENTIAL HYPERTENSION: ICD-10-CM

## 2023-03-07 RX ORDER — LOSARTAN POTASSIUM 25 MG/1
25 TABLET ORAL DAILY
Qty: 90 TABLET | Refills: 0 | Status: SHIPPED | OUTPATIENT
Start: 2023-03-07

## 2023-03-07 RX ORDER — GLIPIZIDE 5 MG/1
5 TABLET, FILM COATED, EXTENDED RELEASE ORAL DAILY
Qty: 90 TABLET | Refills: 0 | Status: SHIPPED | OUTPATIENT
Start: 2023-03-07

## 2023-03-09 DIAGNOSIS — K55.069 SUPERIOR MESENTERIC VEIN THROMBOSIS (HCC): ICD-10-CM

## 2023-03-16 ENCOUNTER — TELEPHONE (OUTPATIENT)
Dept: HEMATOLOGY ONCOLOGY | Facility: CLINIC | Age: 53
End: 2023-03-16

## 2023-03-16 NOTE — TELEPHONE ENCOUNTER
SHAWANDA  Route to [facility]                        Patient appointment rescheduled due to Transfer of care      Speaking with   Patient   If you not speaking with the patient, is the person listed on patients Medical Communication Consent? N/A   Physician patient is established with Dr Malena Quiroga appointment date and time 06/23/2023 @8AM    Appointment Location [place]   Physician patient is transferring care to   [facility] appointment date and time 06/23/2023 @8:30AM    Reason for ROBISON Nelson  Provider is leaving network

## 2023-03-17 ENCOUNTER — OFFICE VISIT (OUTPATIENT)
Dept: FAMILY MEDICINE CLINIC | Facility: CLINIC | Age: 53
End: 2023-03-17

## 2023-03-17 VITALS
RESPIRATION RATE: 18 BRPM | BODY MASS INDEX: 41.82 KG/M2 | DIASTOLIC BLOOD PRESSURE: 63 MMHG | OXYGEN SATURATION: 98 % | HEART RATE: 63 BPM | WEIGHT: 315 LBS | SYSTOLIC BLOOD PRESSURE: 113 MMHG | TEMPERATURE: 98.3 F

## 2023-03-17 DIAGNOSIS — E11.9 TYPE 2 DIABETES MELLITUS WITHOUT COMPLICATION, WITHOUT LONG-TERM CURRENT USE OF INSULIN (HCC): ICD-10-CM

## 2023-03-17 DIAGNOSIS — E11.69 DIABETES MELLITUS TYPE 2 IN OBESE (HCC): ICD-10-CM

## 2023-03-17 DIAGNOSIS — Q98.4 KLINEFELTER'S SYNDROME: ICD-10-CM

## 2023-03-17 DIAGNOSIS — E66.9 DIABETES MELLITUS TYPE 2 IN OBESE (HCC): ICD-10-CM

## 2023-03-17 DIAGNOSIS — Z00.00 WELL ADULT EXAM: Primary | ICD-10-CM

## 2023-03-17 DIAGNOSIS — K55.069 SUPERIOR MESENTERIC VEIN THROMBOSIS (HCC): ICD-10-CM

## 2023-03-17 LAB — SL AMB POCT HEMOGLOBIN AIC: 9.8 (ref ?–6.5)

## 2023-03-17 NOTE — PROGRESS NOTES
Assessment/Plan     Healthy male exam    1  No COVID vaccine  2  Patient Counseling:  --Nutrition: Stressed importance of moderation in sodium/caffeine intake, saturated fat and cholesterol, caloric balance, sufficient intake of fresh fruits, vegetables, fiber, calcium, iron, and 1 mg of folate supplement per day (for females capable of pregnancy)  --Exercise: Stressed the importance of regular exercise  --Substance Abuse: Discussed cessation/primary prevention of tobacco, alcohol, or other drug use; driving or other dangerous activities under the influence; availability of treatment for abuse  --Sexuality: Discussed sexually transmitted diseases, partner selection, use of condoms, avoidance of unintended pregnancy  and contraceptive alternatives  --Injury prevention: Discussed safety belts, safety helmets, smoke detector, smoking near bedding or upholstery  --Dental health: Discussed importance of regular tooth brushing, flossing, and dental visits  --Immunizations reviewed  --Discussed benefits of screening colonoscopy  --After hours service discussed with patient    3  Discussed the patient's BMI with him  The BMI is above average; BMI management plan is completed  4  Follow up as needed for acute illness  Shanika Burnett is a 46 y o  male and is here for a comprehensive physical exam  The patient reports no problems  Do you take any herbs or supplements that were not prescribed by a doctor? no  Are you taking calcium supplements? no  Are you taking aspirin daily? no     History:  Patient receives prostate care outside our clinic  Date last prostate exam: n/a  Date last PSA: n/a    The following portions of the patient's history were reviewed and updated as appropriate:   He  has a past medical history of Asthma, Diabetes mellitus ([address] ), Hypertension, Kidney stone, and Renal calculi    He   Patient Active Problem List    Diagnosis Date Noted   • Chronic anticoagulation 03/21/2022   • Superior mesenteric vein thrombosis ([facility] ) 02/04/2022   • Diabetes mellitus type 2 in obese ([facility] ) 02/04/2022   • Bronchitis 01/30/2019   • Hyperlipidemia 01/30/2019   • Injury of right wrist 02/22/2018   • Infected cyst of skin 02/22/2018   • Hypogonadism, male 09/26/2017   • Klinefelter's syndrome 09/25/2017   • Sleep disturbances 09/25/2017   • Hypertension 08/22/2017   • Morbid obesity with body mass index (BMI) of 40 0 or higher ([place] [id number] ) 08/22/2017   • Nephrolithiasis 02/04/2014     He  has a past surgical history that includes Knee surgery (Right); STONE ANALYSIS (HISTORICAL); TONSILECTOMY AND ADNOIDECTOMY; Cystoscopy w/ ureteral stent placement (02/04/2014); Cystoscopy w/ ureteral stent removal (02/11/2014); and Tonsillectomy  His family history includes Diabetes in his mother; Hypertension in his father and mother; Stroke in his mother  He  reports that he has never smoked  He uses smokeless tobacco  He reports that he does not currently use alcohol  He reports that he does not use drugs    Current Outpatient Medications   Medication Sig Dispense Refill   • apixaban (Eliquis) 5 mg Take 1 tablet (5 mg total) by mouth 2 (two) times a day 60 tablet 0   • glipiZIDE (GLUCOTROL XL) 5 mg 24 hr tablet Take 1 tablet (5 mg total) by mouth daily 90 tablet 0   • losartan (COZAAR) 25 mg tablet Take 1 tablet (25 mg total) by mouth daily 90 tablet 0   • Syringe, Disposable, (2-3CC SYRINGE) 3 ML MISC Use every 21 days 25 each 0     Current Facility-Administered Medications   Medication Dose Route Frequency Provider Last Rate Last Admin   • aluminum-magnesium hydroxide-simethicone (MYLANTA) oral suspension 30 mL  30 mL Oral Q4H PRN JUAN PABLO Salazar       • Lidocaine Viscous HCl (XYLOCAINE) 2 % mucosal solution 15 mL  15 mL Swish & Spit 4x Daily PRN JUAN PABLO Salazar         Current Outpatient Medications on File Prior to Visit   Medication Sig   • apixaban (Eliquis) 5 mg Take 1 tablet (5 mg total) by mouth 2 (two) times a day   • glipiZIDE (GLUCOTROL XL) 5 mg 24 hr tablet Take 1 tablet (5 mg total) by mouth daily   • losartan (COZAAR) 25 mg tablet Take 1 tablet (25 mg total) by mouth daily   • Syringe, Disposable, (2-3CC SYRINGE) 3 ML MISC Use every 21 days     Current Facility-Administered Medications on File Prior to Visit   Medication   • aluminum-magnesium hydroxide-simethicone (MYLANTA) oral suspension 30 mL   • Lidocaine Viscous HCl (XYLOCAINE) 2 % mucosal solution 15 mL     He is allergic to cat hair extract and penicillins       Review of Systems  Do you have pain that bothers you in your daily life? no  Pertinent items are noted in HPI       Objective     /63 (BP Location: Left arm, Patient Position: Sitting, Cuff Size: Large)   Pulse 63   Temp 98 3 °F (36 8 °C) (Tympanic)   Resp 18   Wt (!) 144 kg (317 lb)   SpO2 98%   BMI 41 82 kg/m²     General Appearance:    Alert, cooperative, no distress, appears stated age   Head:    Normocephalic, without obvious abnormality, atraumatic   Eyes:    PERRL, conjunctiva/corneas clear, EOM's intact, fundi     benign, both eyes        Ears:    Normal TM's and external ear canals, both ears   Nose:   Nares normal, septum midline, mucosa normal, no drainage    or sinus tenderness   Throat:   Lips, mucosa, and tongue normal; teeth and gums normal   Neck:   Supple, symmetrical, trachea midline, no adenopathy;        thyroid:  No enlargement/tenderness/nodules; no carotid    bruit or JVD   Back:     Symmetric, no curvature, ROM normal, no CVA tenderness   Lungs:     Clear to auscultation bilaterally, respirations unlabored   Chest wall:    No tenderness or deformity   Heart:    Regular rate and rhythm, S1 and S2 normal, no murmur, rub   or gallop   Abdomen:     Soft, non-tender, bowel sounds active all four quadrants,     no masses, no organomegaly   Name: Mckay Causey      : 1970      MRN: [id number]  Encounter Provider: Joshua Clinton MD Encounter Date: 3/17/2023   Encounter department: [address] [place] [address]     1  Type 2 diabetes mellitus without complication, without long-term current use of insulin (HCC)  -     Urine Microalbumin/creatinine ratio; Future  -     POCT hemoglobin A1c    2  Superior mesenteric vein thrombosis (HCC)    3  Klinefelter's syndrome    4  Diabetes mellitus type 2 in obese (HCC)           Subjective      He has T2DM  He has no side effects and no hypoglycemia  He is safe to drive CDL  His BP is at goal   He has no HA or vision changes  He has no PND or orthopnea  He is on losartan more for kidney protection than for BP control  He is safe to drive CDL  His lipids are at goal     Review of Systems    Current Outpatient Medications on File Prior to Visit   Medication Sig   • apixaban (Eliquis) 5 mg Take 1 tablet (5 mg total) by mouth 2 (two) times a day   • glipiZIDE (GLUCOTROL XL) 5 mg 24 hr tablet Take 1 tablet (5 mg total) by mouth daily   • losartan (COZAAR) 25 mg tablet Take 1 tablet (25 mg total) by mouth daily   • Syringe, Disposable, (2-3CC SYRINGE) 3 ML MISC Use every 21 days       Objective     /63 (BP Location: Left arm, Patient Position: Sitting, Cuff Size: Large)   Pulse 63   Temp 98 3 °F (36 8 °C) (Tympanic)   Resp 18   Wt (!) 144 kg (317 lb)   SpO2 98%   BMI 41 82 kg/m²     Physical Exam  Amador Barragan MD        Extremities:   Extremities normal, atraumatic, no cyanosis or edema   Pulses:   2+ and symmetric all extremities   Skin:   Skin color, texture, turgor normal, no rashes or lesions   Lymph nodes:   Cervical, supraclavicular, and axillary nodes normal   Neurologic:   CNII-XII intact  Normal strength, sensation and reflexes       throughout

## 2023-03-23 DIAGNOSIS — E11.9 TYPE 2 DIABETES MELLITUS WITHOUT COMPLICATION, WITHOUT LONG-TERM CURRENT USE OF INSULIN (HCC): Primary | ICD-10-CM

## 2023-03-23 NOTE — TELEPHONE ENCOUNTER
Marilynn  Services  Amandeep Pharmacist    Bryson Andino is a 46 y o  male who was referred to the pharmacist for T2DM education and management, referred by Loreta Dumont MD        Telemedicine consent  The patient was identified by name and date of birth  Bryson Andino was informed that this is a telemedicine visit and that the visit is being conducted through Telephone  My office door was closed  No one else was in the room  He acknowledged consent and understanding of privacy and security of the video platform  The patient has agreed to participate and understands they can discontinue the visit at any time  Assessment/ Plan       1  Type 2 Diabetes:  • Goal A1c <7% based on ADA guidelines  Most recent A1c above goal    • Reported Home SMBG  o 180-200's which have come down from 300's with diet improvements  • Complications:  o Microvascular complications: N/A  o Macrovascular complications: N/A  • Current Diabetes Regimen:  o Glipizide    Changes to Medication Regimen: If PCP is in agreement with plan  • Patient would benefit from GLP-1 agonist for diabetes control and weight loss  Recommend Trulicty 1 23 mg weekly   • Avoid insulin due to having CDL, 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  Did not tolerate metformin in past      • Most recent labs and diabetes goals discussed     2  On Additional Therapies:  • Statin: No  • ACEI/ARB: yes  • ASA: no    3  Health Maintenance:  • Eye Exam: up to date  • Foot Exam: due  • Urine Microalbumin: due    4  Medication Reconciliation:   • Medication list reviewed with pt at today's visit  • Medication list updated to reflect medications pt is currently taking       5  Hyperlipidemia without clinical ASCVD event:   2018 ACC/AHA lipid guidelines recommend moderate statin  • Patient currently not on statin  Address with future calls  6  HTN:   BP goal less than 130/80 mmHg     • In office BP below goal, HR acceptable  Monitoring: Bg 2 x daily    Referrals placed at this visit: None    Follow-up: 3 weeks    Subjective     1  Medication Adherence/ Tolerability:  • Glipizide XL 5 mg daily   • The patient reports missing 0 doses of medication in the past 2 weeks  Medications Tried in Past:  · Metformin- mental side effects    2  Lifestyle:   • Got rid of pretzels  Plain eating plain cherrios 29 grams per 1/2 cup , total carbs 33 serving size 1/2 cup, apple cinnamon 11 grams per rice cake   • Diet Recall:   o Breakfast: skips typically ; on weekends will have eggs ketchup or oatmeal  o Lunch: turkey and dent sandwich on flat bread wheat (total carbs 28 grams of carbs) no sides  Sometimes will have beef jerky  o Dinner: Fish and vegetables   o Snacks:   o Beverages: only drinks water now 1/2- 1 gallon per day ; stopped soda 5-6 years ago   • Physical Activity:     3  Home monitoring devices  • Glucometer: Yes, Brand:   • CGM: No, Brand:       Objective       Current Outpatient Medications   Medication Instructions   • apixaban (ELIQUIS) 5 mg, Oral, 2 times daily   • glipiZIDE (GLUCOTROL XL) 5 mg, Oral, Daily   • losartan (COZAAR) 25 mg, Oral, Daily   • Syringe, Disposable, (2-3CC SYRINGE) 3 ML MISC Does not apply, Every 21 days     Allergies   Allergen Reactions   • Cat Hair Extract Shortness Of Breath, Itching and Allergic Rhinitis   • Penicillins GI Intolerance     Exacerbates symptoms        There is no immunization history for the selected administration types on file for this patient  I have reviewed the patient's allergies, medications and history as noted in the electronic medical record and updated as necessary  Lab Results   Component Value Date    HGBA1C 9 8 (A) 03/17/2023    HGBA1C 6 3 (H) 03/09/2022    HGBA1C 6 8 (A) 02/09/2022       Blood Glucose Readings  The patient is currently checking blood glucose 2 times per day       Date AM Dinner   3/20 314 304   3/21 186 165 3/22 204 170   3/23 248 264   Avg         ASCVD Risk:  The 10-year ASCVD risk score (Hillary FORRESTER, et al , 2019) is: 8 9%    Values used to calculate the score:      Age: 46 years      Sex: Male      Is Non- : No      Diabetic: Yes      Tobacco smoker: No      Systolic Blood Pressure: 453 mmHg      Is BP treated: Yes      HDL Cholesterol: 35 mg/dL      Total Cholesterol: 175 mg/dL     Vitals:  [unfilled]    Labs:    Lab Results   Component Value Date    SODIUM 136 12/03/2022    K 4 2 12/03/2022    EGFR 103 12/03/2022    CREATININE 0 78 12/03/2022    GLUF 247 (H) 12/03/2022         Pharmacist Tracking Tool     Pharmacist Tracking Tool  Reason For Outreach: Embedded Pharmacist  Demographics:  Intervention Method: Phone  Type of Intervention: New  Topics Addressed: Diabetes  Pharmacologic Interventions: Medication Initiation  Non-Pharmacologic Interventions: Disease state education, Home Monitoring and Medication/Device education  Time:  Direct Patient Care: 40 mins  Care Coordination: 15 mins  Recommendation Recipient: Patient/Caregiver and Provider  Outcome: Accepted

## 2023-03-24 RX ORDER — DULAGLUTIDE 0.75 MG/.5ML
0.75 INJECTION, SOLUTION SUBCUTANEOUS
Qty: 2 ML | Refills: 0 | Status: SHIPPED | OUTPATIENT
Start: 2023-03-24

## 2023-03-28 NOTE — PATIENT INSTRUCTIONS
Diabetes tipo 2 en adultos: Molino diagnóstico   CUIDADO AMBULATORIO:   La diabetes tipo 2 es gem enfermedad que afecta la forma en que el cuerpo utiliza la glucosa (azúcar). Generalmente, cuando el nivel de azúcar en la raulito [place], el páncreas produce más insulina. La insulina ayuda al cuerpo a extraer el azúcar de la raulito con el fin de usarla elissa brittni de [place]. La diabetes mellitus tipo 2 se desarrolla ya sea porque el cuerpo no puede producir suficiente insulina, o es incapaz de usarla adecuadamente. La diabetes tipo 2 puede ser controlada para evitar daño a darnell Mercie Kenny y otros órganos. Los síntomas más comunes incluyen los siguientes:  • Adormecimiento en los dedos de la mano o el pie    • Visión borrosa    • Orinar a menudo    • Más hambre o sed de lo usual    Pídale a alguien que llame al Cloyce Loft de emergencias local (46 en los [place]) si:  • Usted tiene alguno de los siguientes signos de derrame cerebral:      ? Adormecimiento o caída de un lado de darnell heber    ? Debilidad en un Winsome Amas o gem pierna    ? Confusión o debilidad para hablar    ? Mareos o dolor de ashley intenso, o pérdida de la visión. • Tiene alguno de los siguientes signos de un ataque cardíaco:      ? Estrujamiento, presión o tensión en darnell pecho    ? Usted también podría presentar alguno de los siguientes:     - Malestar o dolor en darnell espalda, leroy, mandíbula, abdomen, o brazo    - Falta de aliento    - [employer] o vómitos    - Desvanecimiento o sudor frío repentino    • Usted tiene dificultad para respirar. Busque atención médica de inmediato si:  • Usted tiene dolor abdominal intenso. • Vomita por más de 2 horas. • Usted tiene dificultad para permanecer despierto o concentrarse. • Usted está temblando o sudando. • Usted se siente débil o más cansado de lo habitual.    • Usted tiene visión borrosa o doble. • Darnell aliento huele a fruta o aleks.     Llame a darnell médico o al equipo de cuidado de la diabetes si:  • Tiene hinchazón en los brazos y las piernas. • Tiene malestar estomacal y no puede ingerir los alimentos de darnell plan de comidas. • Usted tiene mareos, lauren de [place] o se irrita con facilidad. • Darnell piel está beck, tibia, seca o inflamada. • Usted tiene gem herida que no cicatriza. • Usted tiene entumecimiento en los brazos o piernas. • Usted tiene problemas para sobrellevar darnell enfermedad, o se siente ansioso o deprimido. • Usted tiene preguntas o inquietudes acerca de darnell condición o cuidado. El tratamiento para la diabetes tipo 2 ayuda a prevenir o retrasar las complicaciones, incluyendo la enfermedad cardíaca y renal. Usted debe comer alimentos saludables y hacer actividad física regularmente. Es posible que usted necesite alguno de los siguientes:  • Medicamentos para la diabetes o insulina se pueden administrar para disminuir la cantidad de azúcar en darnell raulito. • Medicamentos para la presión arterial podrían administrarse para disminuir darnell presión arterial.    • Medicamento para disminuir el colesterol podría administrarse para evitar gem enfermedad cardíaca. • Los medicamentos antiplaquetarios , elissa la aspirina, [place] [facility] a prevenir coágulos de [place]. [place] crystal antiplaquetarios exactamente elissa se le haya indicado. Estos medicamentos podrían causar mas probabilidad para sangrado y para desarrollar moretones. Si le montoya indicado el uso de aspirina, no tome acetaminofén o ibuprofeno en darnell lugar. • [place] crystal medicamentos elissa se le haya indicado. Consulte con darnell médico si usted kolton que darnell medicamento no le está ayudando o si presenta efectos secundarios. Infórmele al médico si usted es alérgico a algún medicamento. Mantenga gem lista actualizada de los [place], las vitaminas y los productos herbales que bettye. Incluya los siguientes datos de los medicamentos: cantidad, frecuencia y motivo de administración.  Saloni Olden con usted la lista o los envases de las píldoras a crystal citas de seguimiento. Lleve la lista de los medicamentos con usted en ally de catherine emergencia. Los exámenes podrían hacerse para detectar la diabetes tipo 2 a partir de los 28 años de [place]. Para diagnosticar la diabetes o comprobar que está paula controlada se puede utilizar cualquiera de los siguientes métodos:  • Un análisis A1c muestra el promedio de la cantidad de azúcar en darnell raulito en los últimos 2 a 3 meses. Darnell médico del equipo de cuidado de la diabetes le indicará el nivel de A1C que es el adecuado para usted. • Un examen de glucosa en plasma en ayunas es cuando se analiza el nivel de azúcar en la raulito después de pamela ayunado por 8 horas. • Catherine prueba de glucosa en plasma de 2 horas empieza cuando le revisan el nivel de azúcar en la raulito después de pamela ayunado por 8 horas. Después se le da catherine bebida de glucosa. El nivel de azúcar en la raulito se comprueba después de 2 horas. • Catherine prueba de glucosa aleatoria puede hacerse a cualquier hora del día, independientemente de cuándo comió usted por última vez. Educación sobre la diabetes: La educación sobre la diabetes se iniciará inmediatamente. La educación sobre la diabetes también puede tener lugar más tarde para refrescar darnell memoria. Darnell equipo de atención de la diabetes puede incluir médicos, enfermeras especializadas y asistentes médicos. También puede incluir enfermeras, dietistas, especialistas en ejercicio, farmacéuticos, dentistas y podiatras. Los [employer] de la giovanny u otras personas que están cerca de usted también pueden ser parte del equipo. Usted y darnell equipo establecerán metas y harán planes para manejar la diabetes y otros problemas de [place]. Los planes y las metas serán específicos de crystal necesidades.  Los miembros del equipo de cuidado de la diabetes le enseñarán lo siguiente:  • Acerca de la nutrición: Un dietista le ayudará a diseñar un plan de alimentación para mantener estable darnell nivel de azúcar en la raulito. Usted aprenderá cómo la comida afecta crystal niveles de azúcar en la raulito. Jolly Favor a realizar un seguimiento del azúcar y los alimentos que contienen almidón (carbohidratos). No se salte ninguna comida. Darnell nivel de azúcar en la raulito puede bajar demasiado si usted ha tomado medicamento para la diabetes y no ha comido. Es posible que le enseñen a utilizar el método del plato para controlar las porciones. Con el método del plato, la mitad de darnell plato contiene vegetales. La otra mitad se divide para que un cuarto tenga carne o proteína y un cuarto tenga almidones, elissa las jovita. • Actividad física y diabetes: Usted aprenderá por qué la actividad física, elissa caminar, es importante. Usted y darnell médico del equipo de cuidados de shikha harán un plan de darnell actividad. Darnell médico del equipo de cuidados de shikha le indicará cuál es el peso saludable para usted. Lo ayudará a hacer un plan para llegar a adrianna peso y permanecer allí. Mantenga un peso saludable para ayudar a retrasar o prevenir las complicaciones de la diabetes. • Nivel de azúcar en la raulito: Usted aprenderá lo que debe ser darnell nivel de azúcar en la raulito. Se le dará información sobre cuándo y cómo comprobar darnell nivel de azúcar en la raulito. Puede que tenga que comprobarlo probando gem gota de Abdirahman Ryan en un medidor de glucosa. En darnell lugar, es posible que le den un monitor continuo de glucosa (MCG). Se coloca un sensor en el abdomen o en el brazo. Se coloca un transmisor en el sensor para obtener gem lectura que aparece en el monitor. Usted aprenderá qué hacer si darnell nivel de azúcar en la raulito es demasiado alto o demasiado bajo. Anote los horarios de las comprobaciones y los niveles de azúcar en la raulito. Lleve los registros a todas las citas de control. • Sobre los medicamentos para la diabetes: Es posible que necesite medicamento oral para la diabetes, insulina o ambos para controlar crystal niveles de glucosa en raulito. Darnell médico le indicará cómo y cuándo debe jose a medicamento oral para la diabetes. También se le enseñarán los efectos secundarios que pueden causar los medicamentos orales para la diabetes. Se puede añadir insulina si los medicamentos orales para la diabetes pierden eficacia con el tiempo. La insulina puede administrarse mediante inyección o gem bomba de insulina o gem pluma. Usted y darnell equipo de atención analizarán qué método es mejor para usted. ? La bomba de insulina es un dispositivo implantado que le da insulina las 24 horas del día. Gem bomba de insulina previene la necesidad de inyecciones múltiples de insulina en un día. ? La pluma para insulina es un dispositivo precargado con la cantidad [place] de insulina. ? A usted y darnell giovanny les enseñarán cómo preparar y administrar la insulina si evens es el mejor método para usted. El equipo de educación también le enseñará cómo desechar las agujas y [place]. ? Aprenderá la cantidad de insulina que necesita y cuándo administrarla. Se le enseñará cuándo no administrar la insulina. También se le enseñará lo que debe hacer si darnell nivel de azúcar en la raulito baja demasiado. [place] puede suceder si usted bettye insulina y no come la cantidad [place] de carbohidratos. Otras maneras para ayudarlo a controlar la diabetes tipo 2:  • Hable con darnell equipo de atención médica si siente más estrés por darnell diagnóstico. El estrés provocado por el diagnóstico puede impedirle cuidarse adecuadamente. Darnell equipo de atención médica puede ayudarlo con consejos sobre el autocuidado. Darnell equipo de [facility] puede sugerirle que hable con un profesional de la shikha mental. [facility] profesional puede escuchar y ofrecer ayuda en cuestiones de autocuidado. • Revise crystal pies todos los días para susu si tienen llagas. Use zapatos y calcetines que le queden paula. No se recorte las [place] [employer]. Madlyn Doles a un podólogo.  Pregunte a darnell equipo de cuidados de shikha por [facility] información sobre el cuidado del pie. • No fume. La nicotina y otros químicos en los cigarrillos y puros pueden causar daño pulmonar y dificultar el manejo de la diabetes. Pida al médico de darnell equipo de cuidados de shikha información si usted fuma actualmente y [place] para dejar de hacerlo. Los cigarrillos electrónicos o el tabaco sin humo igualmente contienen nicotina. Consulte con darnell médico del equipo de cuidados de shikha antes de utilizar estos productos. • Annalisa agua en lugar de bebidas azucaradas, elissa gaseosas y [place] de frutas. Las bebidas azucaradas aumentan el nivel de azúcar en raulito y [place]. Es posible que darnell médico le diga que las bebidas dietéticas no ayudan a perder peso. • Conozca los riesgos si decide beber alcohol. El alcohol puede causar que crystal niveles de azúcar en la raulito estén bajos si [place] insulina. El alcohol puede causar [employer] de azúcar en la raulito y [place] de peso si aidan demasiado. Un trago equivale a 12 onzas de cerveza, 5 onzas de vino o 1 onza y ½ de licor. Darnell equipo de [employer] dirá cuántas bebidas puede jose a en 24 horas y en 1 semana. • Tó[place]ese la presión arterial bruce elissa le indicaron. Si tiene presión arterial (PA) vidhya, hable con darnell equipo de atención sobre crystal [facility]. Juntos pueden crear un plan para bajar la PA si es necesario y [place] en un rango saludable. El plan puede incluir cambios de estilo de rajni o medicamentos. [place] presión arterial normal es 119/79 o inferior. Myrle Guild arterial normal puede ayudar a prevenir o retrasar determinadas complicaciones de la diabetes. Por ejemplo, retinopatía (daño ocular) y daño renal.         • Use identificación de alerta médica. Use un brazalete o collar de alerta médica o lleve consigo gem tarjeta que indique que tiene diabetes. Pregúntele al médico del equipo de cuidados de shikha dónde puede conseguir estos productos. • Pregunte sobre las vacunas que pudiera necesitar.  Adelaide Forbes corre un mayor riesgo de presentar enfermedades graves si se contagia gripe, neumonía, COVID-19 o hepatitis. Pregunte a darnell médico si debe vacunarse para prevenir estas u otras enfermedades, y cuándo debe hacerlo. Riesgos de la diabetes tipo 2: Es importante aprender a mantener la diabetes bajo control. La diabetes que no está controlada puede causar daño a darnell sistema nervioso, las venas y las arterias. El riesgo de demencia aumenta más rápidamente cuanto más tiempo no se controle la diabetes. Los [place]rol[employer] de azúcar en la raulito podrían dañar tejidos y órganos del cuerpo con el tiempo. El daño a las arterias [place] darnell riesgo de sufrir un ataque Fabiola Hazel y un derrame cerebral. El daño a los nervios puede llevar a otros problemas del [facility] y de los nervios. La diabetes es gem enfermedad potencialmente mortal si no recibe tratamiento. Acuda a crystal consultas de control con los médicos de darnell equipo de cuidados de shikha según le indicaron: Necesitará regresar para que le realicen el examen de hemoglobina A1c cada 3 meses. Es necesario que le revisen los pies por lo menos en 1 visita al [employer]. Necesitará de un examen de la vista gem vez al año para revisar si tiene retinopatía. También necesitará pruebas para comprobar si tiene enfermedad renal o cardíaca y presión arterial vidhya. Anote crystal preguntas para que se acuerde de hacerlas rodney crystal visitas. © Copyright Fish Reg[employer] 2022 Information is for End User's use only and may not be sold, redistributed or otherwise used for commercial purposes. Esta información es sólo para uso en educación. Darnell intención no es darle un consejo médico sobre enfermedades o tratamientos. Colsulte con darnell Sharin Edman farmacéutico antes de seguir cualquier régimen médico para saber si es seguro y efectivo para usted.

## 2023-04-05 DIAGNOSIS — K55.069 SUPERIOR MESENTERIC VEIN THROMBOSIS (HCC): ICD-10-CM

## 2023-05-03 DIAGNOSIS — E11.9 TYPE 2 DIABETES MELLITUS WITHOUT COMPLICATION, WITHOUT LONG-TERM CURRENT USE OF INSULIN (HCC): Primary | ICD-10-CM

## 2023-05-09 DIAGNOSIS — K55.069 SUPERIOR MESENTERIC VEIN THROMBOSIS (HCC): ICD-10-CM

## 2023-05-13 ENCOUNTER — LAB (OUTPATIENT)
Dept: LAB | Facility: HOSPITAL | Age: 53
End: 2023-05-13

## 2023-05-13 DIAGNOSIS — K55.069 SUPERIOR MESENTERIC VEIN THROMBOSIS (HCC): ICD-10-CM

## 2023-05-13 DIAGNOSIS — E11.9 TYPE 2 DIABETES MELLITUS WITHOUT COMPLICATION, WITHOUT LONG-TERM CURRENT USE OF INSULIN (HCC): ICD-10-CM

## 2023-05-13 LAB
ALBUMIN SERPL BCP-MCNC: 4.2 G/DL (ref 3.5–5)
ALP SERPL-CCNC: 45 U/L (ref 34–104)
ALT SERPL W P-5'-P-CCNC: 26 U/L (ref 7–52)
ANION GAP SERPL CALCULATED.3IONS-SCNC: 7 MMOL/L (ref 4–13)
AST SERPL W P-5'-P-CCNC: 16 U/L (ref 13–39)
BILIRUB SERPL-MCNC: 1.59 MG/DL (ref 0.2–1)
BUN SERPL-MCNC: 18 MG/DL (ref 5–25)
CALCIUM SERPL-MCNC: 9.4 MG/DL (ref 8.4–10.2)
CHLORIDE SERPL-SCNC: 106 MMOL/L (ref 96–108)
CHOLEST SERPL-MCNC: 154 MG/DL
CO2 SERPL-SCNC: 25 MMOL/L (ref 21–32)
CREAT SERPL-MCNC: 0.91 MG/DL (ref 0.6–1.3)
CREAT UR-MCNC: 213 MG/DL
ERYTHROCYTE [DISTWIDTH] IN BLOOD BY AUTOMATED COUNT: 12.8 % (ref 11.6–15.1)
EST. AVERAGE GLUCOSE BLD GHB EST-MCNC: 140 MG/DL
GFR SERPL CREATININE-BSD FRML MDRD: 96 ML/MIN/1.73SQ M
GLUCOSE P FAST SERPL-MCNC: 123 MG/DL (ref 65–99)
HBA1C MFR BLD: 6.5 %
HCT VFR BLD AUTO: 42.4 % (ref 36.5–49.3)
HDLC SERPL-MCNC: 30 MG/DL
HGB BLD-MCNC: 14.3 G/DL (ref 12–17)
LDLC SERPL CALC-MCNC: 105 MG/DL (ref 0–100)
MCH RBC QN AUTO: 29.7 PG (ref 26.8–34.3)
MCHC RBC AUTO-ENTMCNC: 33.7 G/DL (ref 31.4–37.4)
MCV RBC AUTO: 88 FL (ref 82–98)
MICROALBUMIN UR-MCNC: 12.1 MG/L (ref 0–20)
MICROALBUMIN/CREAT 24H UR: 6 MG/G CREATININE (ref 0–30)
NONHDLC SERPL-MCNC: 124 MG/DL
PLATELET # BLD AUTO: 180 THOUSANDS/UL (ref 149–390)
PMV BLD AUTO: 10.4 FL (ref 8.9–12.7)
POTASSIUM SERPL-SCNC: 3.8 MMOL/L (ref 3.5–5.3)
PROT SERPL-MCNC: 7.1 G/DL (ref 6.4–8.4)
RBC # BLD AUTO: 4.81 MILLION/UL (ref 3.88–5.62)
SODIUM SERPL-SCNC: 138 MMOL/L (ref 135–147)
TRIGL SERPL-MCNC: 97 MG/DL
WBC # BLD AUTO: 6.36 THOUSAND/UL (ref 4.31–10.16)

## 2023-05-13 PROCEDURE — 82570 ASSAY OF URINE CREATININE: CPT | Performed by: FAMILY MEDICINE

## 2023-05-13 PROCEDURE — 82043 UR ALBUMIN QUANTITATIVE: CPT | Performed by: FAMILY MEDICINE

## 2023-05-17 ENCOUNTER — CLINICAL SUPPORT (OUTPATIENT)
Dept: FAMILY MEDICINE CLINIC | Facility: CLINIC | Age: 53
End: 2023-05-17

## 2023-05-17 DIAGNOSIS — E66.9 DIABETES MELLITUS TYPE 2 IN OBESE (HCC): Primary | ICD-10-CM

## 2023-05-17 DIAGNOSIS — E11.69 DIABETES MELLITUS TYPE 2 IN OBESE (HCC): Primary | ICD-10-CM

## 2023-05-17 NOTE — PROGRESS NOTES
[facility]  Services  Amandeep Pharmacist    Lena Lainez is a 46 y o  male who was referred to the pharmacist for T2DM education and management, referred by Juju Sanchez MD        Telemedicine consent  The patient was identified by name and date of birth  Lena Lainez was informed that this is a telemedicine visit and that the visit is being conducted through Telephone  My office door was closed  No one else was in the room  He acknowledged consent and understanding of privacy and security of the video platform  The patient has agreed to participate and understands they can discontinue the visit at any time  Assessment/ Plan       1  Type 2 Diabetes:  • Goal A1c <7% based on ADA guidelines  Most recent A1c at goal at 6 5% (5/13/23)  • Reported Home SMBG  o Significant improvement in blood sugar control  • Complications:  o Microvascular complications: N/A  o Macrovascular complications: N/A  • Current Diabetes Regimen:  o Trulicity 1 5 mg weekly    Changes to Medication Regimen: If PCP is in agreement with plan  • A1c now at goal  Continue Trulicity 1 5 mg weekly    • Most recent labs and diabetes goals discussed     2  On Additional Therapies:  • Statin: No  • ACEI/ARB: yes  • ASA: no    3  Health Maintenance:  • Eye Exam: up to date  • Foot Exam: due  • Urine Microalbumin: up to date    4  Medication Reconciliation:   • Medication list reviewed with pt at today's visit  • Medication list updated to reflect medications pt is currently taking       5  Hyperlipidemia without clinical ASCVD event:   2018 ACC/AHA lipid guidelines recommend moderate statin  • Patient currently not on statin  Address with future calls  6  HTN:   BP goal less than 130/80 mmHg  • In office BP below goal, HR acceptable  Monitoring: Bg 2 x daily    Referrals placed at this visit: None    Follow-up: as needed with clinical pharmacist    Subjective     1   Medication Adherence/ Tolerability:  • DISCONTINUED Glipizide XL 5 mg daily as instructed  • Trulicity 1 5 weekly- denies side effects such as N/V/D/C  Tolerating well  Medications Tried in Past:  · Metformin- mental side effects    2  Lifestyle:   • Continues to make diet improvement  Eliminated snack food  May have a protein drink for snack  Eating more tuna fish  Smaller portion sizes and less carbohydrates   • Diet Recall:   o Breakfast: skips typically ; on weekends will have eggs ketchup or oatmeal  o Lunch: turkey and dent sandwich on flat bread wheat (total carbs 28 grams of carbs) no sides  Sometimes will have beef jerky  o Dinner: Fish and vegetables   o Snacks:   o Beverages: only drinks water now 1/2- 1 gallon per day ; stopped soda 5-6 years ago   • Physical Activity:     3  Home monitoring devices  • Glucometer: Yes, Brand:   • CGM: No, Brand:       Objective       Current Outpatient Medications   Medication Instructions   • apixaban (ELIQUIS) 5 mg, Oral, 2 times daily   • dulaglutide (TRULICITY) 1 5 mg, Subcutaneous, Every 7 days   • losartan (COZAAR) 25 mg, Oral, Daily   • Syringe, Disposable, (2-3CC SYRINGE) 3 ML MISC Does not apply, Every 21 days     Allergies   Allergen Reactions   • Cat Hair Extract Shortness Of Breath, Itching and Allergic Rhinitis   • Penicillins GI Intolerance     Exacerbates symptoms        There is no immunization history for the selected administration types on file for this patient  I have reviewed the patient's allergies, medications and history as noted in the electronic medical record and updated as necessary  Lab Results   Component Value Date    HGBA1C 6 5 (H) 05/13/2023    HGBA1C 9 8 (A) 03/17/2023    HGBA1C 6 3 (H) 03/09/2022       Blood Glucose Readings  The patient is currently checking blood glucose 2 times per day       Date AM Dinner   4/23 254 90   4/24 124 100    143 152    123 105    131 109    126 110    178 148   4/30 180 118   5/1 127 138    129 113    150 126 182 161    148 128    154 117    155 115   5/8 143 140    138 114    145 153    125 106    128 115    164 110   5/16 129 105   Avg 144 121       ASCVD Risk:  The 10-year ASCVD risk score (Hillary FORRESTER, et al , 2019) is: 8 8%    Values used to calculate the score:      Age: 46 years      Sex: Male      Is Non- : No      Diabetic: Yes      Tobacco smoker: No      Systolic Blood Pressure: 722 mmHg      Is BP treated: Yes      HDL Cholesterol: 30 mg/dL      Total Cholesterol: 154 mg/dL       Labs:    Lab Results   Component Value Date    SODIUM 138 05/13/2023    K 3 8 05/13/2023    EGFR 96 05/13/2023    CREATININE 0 91 05/13/2023    GLUF 123 (H) 05/13/2023    MICROALBCRE 6 05/13/2023         Pharmacist Tracking Tool     Pharmacist Tracking Tool  Reason For Outreach: Embedded Pharmacist  Demographics:  Intervention Method: Phone  Type of Intervention: Follow-Up  Topics Addressed: Diabetes  Pharmacologic Interventions: N/A  Non-Pharmacologic Interventions: Disease state education, Home Monitoring and Medication/Device education  Time:  Direct Patient Care: 15 mins  Care Coordination: 15 mins  Recommendation Recipient: Patient/Caregiver and Provider  Outcome: Accepted

## 2023-06-05 DIAGNOSIS — I10 ESSENTIAL HYPERTENSION: ICD-10-CM

## 2023-06-05 DIAGNOSIS — K55.069 SUPERIOR MESENTERIC VEIN THROMBOSIS (HCC): ICD-10-CM

## 2023-06-05 RX ORDER — LOSARTAN POTASSIUM 25 MG/1
25 TABLET ORAL DAILY
Qty: 90 TABLET | Refills: 0 | Status: SHIPPED | OUTPATIENT
Start: 2023-06-05

## 2023-06-23 ENCOUNTER — OFFICE VISIT (OUTPATIENT)
Dept: HEMATOLOGY ONCOLOGY | Facility: CLINIC | Age: 53
End: 2023-06-23
Payer: COMMERCIAL

## 2023-06-23 VITALS
TEMPERATURE: 97.9 F | RESPIRATION RATE: 18 BRPM | OXYGEN SATURATION: 99 % | WEIGHT: 300 LBS | HEART RATE: 88 BPM | BODY MASS INDEX: 39.76 KG/M2 | HEIGHT: 73 IN | DIASTOLIC BLOOD PRESSURE: 70 MMHG | SYSTOLIC BLOOD PRESSURE: 124 MMHG

## 2023-06-23 DIAGNOSIS — Z79.01 CHRONIC ANTICOAGULATION: ICD-10-CM

## 2023-06-23 DIAGNOSIS — K55.069 SUPERIOR MESENTERIC VEIN THROMBOSIS (HCC): Primary | ICD-10-CM

## 2023-06-23 PROCEDURE — 99214 OFFICE O/P EST MOD 30 MIN: CPT

## 2023-06-23 NOTE — PROGRESS NOTES
[address] [facility] [address] [address]  Hematology Ambulatory Follow-Up  Miguel Kong, 1970, [id number]  6/23/2023    Assessment/Plan:  1  Superior mesenteric vein thrombosis ([address] )  2  Chronic anticoagulation  Mr Andreea Walter is a 51-year-old male seen in follow-up/transfer of care for anticoagulation monitoring  He was diagnosed with an unprovoked extensive SMV thrombus on 2/4/2022  Thrombophilia work-up was negative  Due to on provoking features of this event along with his nonmodifiable risk factors including male, obesity, and  by trade lifelong/indefinite anticoagulation is recommended  He has been enrolled in the drug program and Eliquis is now affordable to him as previously it was up over $500 for co-pay  He follows with his primary care doctor twice a year who has been providing his refills of Eliquis  As there are no change in recommendations regarding anticoagulation there is no ongoing need for hematology follow-up and he can continue to follow with his PCP regularly  He will remain on Eliquis 5 mg twice daily indefinitely or until bleeding risk outweigh the benefits  Patient voiced agreement and understanding to the above  Patient knows to call the Hematology/Oncology office with any questions and concerns regarding the above  Barrier(s) to care: None  The patient is able to self care   ------------------------------------------------------------------------------------------------------    Chief Complaint   Patient presents with   • Follow-up       History of present illness:   Lurdes Cross is a 51-year-old male seen in follow-up/transfer of care from Dr Stephanie Cook  He is seen for anticoagulation monitoring after being diagnosed with extensive SMV thrombus in February 2022  He has been maintained on Eliquis for anticoagulation 5 mg twice daily    Initial work-up included thrombophilia and JAK2 testing which was negative  He is up-to-date on cancer screenings including colonoscopy and prostate exams  There were no other provoking features associated with this event  He does have nonmodifiable risk factors including male gender, obesity, and a  by trade therefore lifelong/indefinite anticoagulation was recommended  Now that he is on the prescription drug plan the Eliquis is not as financially taxing on him  It was over $500 co-pay per month previously  He tolerates anticoagulation well without any blood in his stool or urine  He denies excess bleeding or bruising  He denies epistaxis or bleeding gums  Review of Systems   Constitutional: Negative for activity change, appetite change, fatigue, fever and unexpected weight change  HENT: Negative for trouble swallowing and voice change  Eyes: Negative for photophobia and visual disturbance  Respiratory: Negative for cough, chest tightness and shortness of breath  Cardiovascular: Negative for chest pain, palpitations and leg swelling  Gastrointestinal: Negative  Endocrine: Negative for cold intolerance and heat intolerance  Genitourinary: Negative  Musculoskeletal: Negative  Skin: Negative  Neurological: Negative for dizziness, weakness, light-headedness, numbness and headaches  Hematological: Negative for adenopathy  Does not bruise/bleed easily  Psychiatric/Behavioral: Negative          Patient Active Problem List   Diagnosis   • Hypertension   • Hypogonadism, male   • Klinefelter's syndrome   • Nephrolithiasis   • Morbid obesity with body mass index (BMI) of 40 0 or higher (HCC)   • Sleep disturbances   • Injury of right wrist   • Infected cyst of skin   • Bronchitis   • Hyperlipidemia   • Superior mesenteric vein thrombosis (HCC)   • Diabetes mellitus type 2 in obese [facility])   • Chronic anticoagulation       Past Medical History:   Diagnosis Date   • Asthma    • Diabetes mellitus ([facility] [address] )    • Hypertension    • Kidney stone    • Renal calculi     Last assessed: 1/11/18       Past Surgical History:   Procedure Laterality Date   • CYSTOSCOPY W/ URETERAL STENT PLACEMENT  02/04/2014   • CYSTOSCOPY W/ URETERAL STENT REMOVAL  02/11/2014    w/urteroscopy w/removal of calculus   • KNEE SURGERY Right    • STONE ANALYSIS (HISTORICAL)     • TONSILECTOMY AND ADNOIDECTOMY     • TONSILLECTOMY         Family History   Problem Relation Age of Onset   • Diabetes Mother         Mellitus   • Hypertension Mother    • Stroke Mother         Syndrome   • Hypertension Father        Social History     Socioeconomic History   • Marital status: /Civil Union     Spouse name: None   • Number of children: None   • Years of education: None   • Highest education level: None   Occupational History   • None   Tobacco Use   • Smoking status: Never     Passive exposure: Never   • Smokeless tobacco: Current   Vaping Use   • Vaping Use: Never used   Substance and Sexual Activity   • Alcohol use: Not Currently     Alcohol/week: 0 0 standard drinks of alcohol     Comment: Per [employer]: Occasional alcohol use   • Drug use: No   • Sexual activity: None   Other Topics Concern   • None   Social History Narrative    Always uses seatbelt    Always uses sunscreen    Caffeine use    Dental care: regularly    Feels safe at home    Lives independently w/spouse     Social Determinants of Health     Financial Resource Strain: Not on file   Food Insecurity: No Food Insecurity (2/5/2022)    Hunger Vital Sign    • Worried About Running Out of Food in the Last Year: Never true    • Ran Out of Food in the Last Year: Never true   Transportation Needs: No Transportation Needs (2/5/2022)    PRAPARE - Transportation    • Lack of Transportation (Medical): No    • Lack of Transportation (Non-Medical):  No   Physical Activity: Not on file   Stress: Not on file   Social Connections: Not on file   Intimate Partner Violence: Not on file   Housing Stability: Low Risk  (2/5/2022) "Housing Stability Vital Sign    • Unable to Pay for Housing in the Last Year: No    • Number of Places Lived in the Last Year: 1    • Unstable Housing in the Last Year: No         Current Outpatient Medications:   •  apixaban (Eliquis) 5 mg, Take 1 tablet (5 mg total) by mouth 2 (two) times a day, Disp: 60 tablet, Rfl: 0  •  dulaglutide (Trulicity) 1 5 PP/2 8AU injection, Inject 0 5 mL (1 5 mg total) under the skin every 7 days, Disp: 6 mL, Rfl: 1  •  losartan (COZAAR) 25 mg tablet, Take 1 tablet (25 mg total) by mouth daily, Disp: 90 tablet, Rfl: 0  •  Syringe, Disposable, (2-3CC SYRINGE) 3 ML MISC, Use every 21 days, Disp: 25 each, Rfl: 0    Current Facility-Administered Medications:   •  aluminum-magnesium hydroxide-simethicone (MYLANTA) oral suspension 30 mL, 30 mL, Oral, Q4H PRN, Candelario Tamez, FERNANDONP  •  Lidocaine Viscous HCl (XYLOCAINE) 2 % mucosal solution 15 mL, 15 mL, Swish & Spit, 4x Daily PRN, Candelario Tamez, CRNP    Allergies   Allergen Reactions   • Cat Hair Extract Shortness Of Breath, Itching and Allergic Rhinitis   • Penicillins GI Intolerance     Exacerbates symptoms        Objective:  /70 (BP Location: Left arm, Patient Position: Sitting, Cuff Size: Adult)   Pulse 88   Temp 97 9 °F (36 6 °C) (Temporal)   Resp 18   Ht 6' 1\" (1 854 m)   Wt 136 kg (300 lb)   SpO2 99%   BMI 39 58 kg/m²    Physical Exam  Constitutional:       General: He is not in acute distress  Appearance: Normal appearance  He is obese  He is not ill-appearing  HENT:      Head: Normocephalic and atraumatic  Eyes:      Extraocular Movements: Extraocular movements intact  Conjunctiva/sclera: Conjunctivae normal       Pupils: Pupils are equal, round, and reactive to light  Cardiovascular:      Rate and Rhythm: Normal rate and regular rhythm  Pulses: Normal pulses  Heart sounds: No murmur heard  Pulmonary:      Effort: Pulmonary effort is normal  No respiratory distress        Breath sounds: Normal " breath sounds  Abdominal:      General: Abdomen is flat  Bowel sounds are normal  There is no distension  Palpations: Abdomen is soft  Tenderness: There is no abdominal tenderness  Musculoskeletal:         General: Normal range of motion  Cervical back: Normal range of motion  No tenderness  Right lower leg: No edema  Left lower leg: No edema  Skin:     General: Skin is warm and dry  Capillary Refill: Capillary refill takes less than 2 seconds  Coloration: Skin is not jaundiced or pale  Neurological:      General: No focal deficit present  Mental Status: He is alert and oriented to person, place, and time  Mental status is at baseline  Motor: No weakness  Gait: Gait normal    Psychiatric:         Mood and Affect: Mood normal          Behavior: Behavior normal          Thought Content: Thought content normal          Judgment: Judgment normal          Result Review  Labs:  No visits with results within 1 Month(s) from this visit     Latest known visit with results is:   Lab on 05/13/2023   Component Date Value Ref Range Status   • WBC 05/13/2023 6 36  4 31 - 10 16 Thousand/uL Final   • RBC 05/13/2023 4 81  3 88 - 5 62 Million/uL Final   • Hemoglobin 05/13/2023 14 3  12 0 - 17 0 g/dL Final   • Hematocrit 05/13/2023 42 4  36 5 - 49 3 % Final   • MCV 05/13/2023 88  82 - 98 fL Final   • MCH 05/13/2023 29 7  26 8 - 34 3 pg Final   • MCHC 05/13/2023 33 7  31 4 - 37 4 g/dL Final   • RDW 05/13/2023 12 8  11 6 - 15 1 % Final   • Platelets 47/15/0601 180  149 - 390 Thousands/uL Final   • MPV 05/13/2023 10 4  8 9 - 12 7 fL Final   • Cholesterol 05/13/2023 154  See Comment mg/dL Final    Cholesterol:         Pediatric <18 Years        Desirable          <170 mg/dL      Borderline High    170-199 mg/dL      High               >=200 mg/dL        Adult >=18 Years            Desirable         <200 mg/dL      Borderline High   200-239 mg/dL      High              >239 mg/dL • Triglycerides 05/13/2023 97  See Comment mg/dL Final    Triglyceride:     0-9Y            <75mg/dL     10Y-17Y         <90 mg/dL       >=18Y     Normal          <150 mg/dL     Borderline High 150-199 mg/dL     High            200-499 mg/dL        Very High       >499 mg/dL    Specimen collection should occur prior to N-Acetylcysteine or Metamizole administration due to the potential for falsely depressed results  • HDL, Direct 05/13/2023 30 (L)  >=40 mg/dL Final   • LDL Calculated 05/13/2023 105 (H)  0 - 100 mg/dL Final    LDL Cholesterol:     Optimal           <100 mg/dl     Near Optimal      100-129 mg/dl     Above Optimal       Borderline High 130-159 mg/dl       High            160-189 mg/dl       Very High       >189 mg/dl         This screening LDL is a calculated result  It does not have the accuracy of the Direct Measured LDL in the monitoring of patients with hyperlipidemia and/or statin therapy  Direct Measure LDL (PGM374) must be ordered separately in these patients  • Non-HDL-Chol (CHOL-HDL) 05/13/2023 124  mg/dl Final   • Sodium 05/13/2023 138  135 - 147 mmol/L Final   • Potassium 05/13/2023 3 8  3 5 - 5 3 mmol/L Final   • Chloride 05/13/2023 106  96 - 108 mmol/L Final   • CO2 05/13/2023 25  21 - 32 mmol/L Final   • ANION GAP 05/13/2023 7  4 - 13 mmol/L Final   • BUN 05/13/2023 18  5 - 25 mg/dL Final   • Creatinine 05/13/2023 0 91  0 60 - 1 30 mg/dL Final    Standardized to IDMS reference method   • Glucose, Fasting 05/13/2023 123 (H)  65 - 99 mg/dL Final   • Calcium 05/13/2023 9 4  8 4 - 10 2 mg/dL Final   • AST 05/13/2023 16  13 - 39 U/L Final   • ALT 05/13/2023 26  7 - 52 U/L Final    Specimen collection should occur prior to Sulfasalazine administration due to the potential for falsely depressed results      • Alkaline Phosphatase 05/13/2023 45  34 - 104 U/L Final   • Total Protein 05/13/2023 7 1  6 4 - 8 4 g/dL Final   • Albumin 05/13/2023 4 2  3 5 - 5 0 g/dL Final   • Total Bilirubin 05/13/2023 1 59 (H)  0 20 - 1 00 mg/dL Final    Use of this assay is not recommended for patients undergoing treatment with eltrombopag due to the potential for falsely elevated results  N-acetyl-p-benzoquinone imine (metabolite of Acetaminophen) will generate erroneously low results in samples for patients that have taken an overdose of Acetaminophen  • eGFR 05/13/2023 96  ml/min/1 73sq m Final   • Hemoglobin A1C 05/13/2023 6 5 (H)  Normal 3 8-5 6%; PreDiabetic 5 7-6 4%; Diabetic >=6 5%; Glycemic control for adults with diabetes <7 0% % Final   • EAG 05/13/2023 140  mg/dl Final       Imaging:   No relevant imaging to review     Please note: This report has been generated by a voice recognition software system  Therefore there may be syntax, spelling, and/or grammatical errors  Please call if you have any questions

## 2023-06-29 DIAGNOSIS — K55.069 SUPERIOR MESENTERIC VEIN THROMBOSIS (HCC): ICD-10-CM

## 2023-08-17 DIAGNOSIS — I10 ESSENTIAL HYPERTENSION: ICD-10-CM

## 2023-08-17 RX ORDER — LOSARTAN POTASSIUM 25 MG/1
25 TABLET ORAL DAILY
Qty: 90 TABLET | Refills: 0 | Status: SHIPPED | OUTPATIENT
Start: 2023-08-17

## 2023-08-31 DIAGNOSIS — K55.069 SUPERIOR MESENTERIC VEIN THROMBOSIS (HCC): ICD-10-CM

## 2023-10-02 DIAGNOSIS — K55.069 SUPERIOR MESENTERIC VEIN THROMBOSIS (HCC): ICD-10-CM

## 2023-10-02 DIAGNOSIS — E11.9 TYPE 2 DIABETES MELLITUS WITHOUT COMPLICATION, WITHOUT LONG-TERM CURRENT USE OF INSULIN (HCC): ICD-10-CM

## 2023-10-02 RX ORDER — DULAGLUTIDE 1.5 MG/.5ML
INJECTION, SOLUTION SUBCUTANEOUS
Qty: 12 ML | Refills: 0 | Status: SHIPPED | OUTPATIENT
Start: 2023-10-02 | End: 2023-10-03 | Stop reason: SDUPTHER

## 2023-10-03 DIAGNOSIS — E11.9 TYPE 2 DIABETES MELLITUS WITHOUT COMPLICATION, WITHOUT LONG-TERM CURRENT USE OF INSULIN (HCC): ICD-10-CM

## 2023-10-05 RX ORDER — DULAGLUTIDE 1.5 MG/.5ML
1.5 INJECTION, SOLUTION SUBCUTANEOUS
Qty: 12 ML | Refills: 0 | Status: SHIPPED | OUTPATIENT
Start: 2023-10-05

## 2023-11-06 DIAGNOSIS — K55.069 SUPERIOR MESENTERIC VEIN THROMBOSIS (HCC): ICD-10-CM

## 2023-12-01 ENCOUNTER — OFFICE VISIT (OUTPATIENT)
Dept: FAMILY MEDICINE CLINIC | Facility: CLINIC | Age: 53
End: 2023-12-01
Payer: COMMERCIAL

## 2023-12-01 VITALS
BODY MASS INDEX: 38.3 KG/M2 | SYSTOLIC BLOOD PRESSURE: 138 MMHG | DIASTOLIC BLOOD PRESSURE: 80 MMHG | HEART RATE: 67 BPM | OXYGEN SATURATION: 98 % | TEMPERATURE: 97.8 F | WEIGHT: 289 LBS | HEIGHT: 73 IN

## 2023-12-01 DIAGNOSIS — B00.1 COLD SORE: ICD-10-CM

## 2023-12-01 DIAGNOSIS — I10 ESSENTIAL HYPERTENSION: ICD-10-CM

## 2023-12-01 DIAGNOSIS — E29.1 HYPOGONADISM, MALE: ICD-10-CM

## 2023-12-01 DIAGNOSIS — E11.69 DIABETES MELLITUS TYPE 2 IN OBESE: Primary | ICD-10-CM

## 2023-12-01 DIAGNOSIS — R69 SICK: ICD-10-CM

## 2023-12-01 DIAGNOSIS — Z79.01 CHRONIC ANTICOAGULATION: ICD-10-CM

## 2023-12-01 DIAGNOSIS — E66.01 MORBID OBESITY WITH BODY MASS INDEX (BMI) OF 40.0 OR HIGHER (HCC): ICD-10-CM

## 2023-12-01 DIAGNOSIS — E66.9 DIABETES MELLITUS TYPE 2 IN OBESE: Primary | ICD-10-CM

## 2023-12-01 DIAGNOSIS — K55.069 SUPERIOR MESENTERIC VEIN THROMBOSIS (HCC): ICD-10-CM

## 2023-12-01 DIAGNOSIS — I10 PRIMARY HYPERTENSION: ICD-10-CM

## 2023-12-01 DIAGNOSIS — E78.5 HYPERLIPIDEMIA, UNSPECIFIED HYPERLIPIDEMIA TYPE: ICD-10-CM

## 2023-12-01 LAB — SL AMB POCT HEMOGLOBIN AIC: 5.5 (ref ?–6.5)

## 2023-12-01 PROCEDURE — 99215 OFFICE O/P EST HI 40 MIN: CPT | Performed by: FAMILY MEDICINE

## 2023-12-01 PROCEDURE — 83036 HEMOGLOBIN GLYCOSYLATED A1C: CPT | Performed by: FAMILY MEDICINE

## 2023-12-01 RX ORDER — VALACYCLOVIR HYDROCHLORIDE 1 G/1
TABLET, FILM COATED ORAL
Qty: 4 TABLET | Refills: 5 | Status: SHIPPED | OUTPATIENT
Start: 2023-12-01 | End: 2023-12-01

## 2023-12-01 RX ORDER — AZITHROMYCIN 250 MG/1
TABLET, FILM COATED ORAL
Qty: 6 TABLET | Refills: 0 | Status: SHIPPED | OUTPATIENT
Start: 2023-12-01 | End: 2023-12-05

## 2023-12-01 RX ORDER — LOSARTAN POTASSIUM 25 MG/1
25 TABLET ORAL DAILY
Qty: 90 TABLET | Refills: 0 | Status: SHIPPED | OUTPATIENT
Start: 2023-12-01

## 2023-12-01 NOTE — PROGRESS NOTES
Diabetic Foot Exam    Patient's shoes and socks removed. Right Foot/Ankle   Right Foot Inspection  Skin Exam: skin normal and skin intact. No dry skin, no warmth, no callus, no erythema, no maceration, no abnormal color, no pre-ulcer, no ulcer and no callus. Toe Exam: ROM and strength within normal limits. Sensory   Vibration: intact  Proprioception: intact  Monofilament testing: intact    Vascular  Capillary refills: < 3 seconds  The right DP pulse is 2+. The right PT pulse is 2+. Left Foot/Ankle  Left Foot Inspection  Skin Exam: skin normal and skin intact. No dry skin, no warmth, no erythema, no maceration, normal color, no pre-ulcer, no ulcer and no callus. Toe Exam: ROM and strength within normal limits. Sensory   Vibration: intact  Proprioception: intact  Monofilament testing: intact    Vascular  Capillary refills: < 3 seconds  The left DP pulse is 2+. The left PT pulse is 2+. Assign Risk Category  No deformity present  No loss of protective sensation  No weak pulses  Risk: 0  Assessment/Plan:    Superior mesenteric vein thrombosis (HCC)  We discussed discontinuing DOAC. He has no bleeding episodes and would prefer to stay on the medication. Hypogonadism, male  Testosterone levels at goal.  Continue current. Diabetes mellitus type 2 in obese [facility])    Lab Results   Component Value Date    HGBA1C 6.5 (H) 05/13/2023   A1c at goal. Continue current. Hypertension  BP at goal. Continue current. Morbid obesity with body mass index (BMI) of 40.0 or higher ([address])  Nice weight loss with GLP-1. Hyperlipidemia  LDL at goal.  Continue current. Chronic anticoagulation  No bleeding episodes or side effects. DOAC is affordable. Continue current. Diagnoses and all orders for this visit:    Diabetes mellitus type 2 in obese   -     POCT hemoglobin A1c    Superior mesenteric vein thrombosis (HCC)  -     apixaban (Eliquis) 5 mg;  Take 1 tablet (5 mg total) by mouth 2 (two) times a day    Essential hypertension  -     losartan (COZAAR) 25 mg tablet; Take 1 tablet (25 mg total) by mouth daily    Hypogonadism, male    Primary hypertension    Morbid obesity with body mass index (BMI) of 40.0 or higher ([facility])    Hyperlipidemia, unspecified hyperlipidemia type    Chronic anticoagulation    Cold sore  -     valACYclovir (VALTREX) 1,000 mg tablet; Take 2000 mg (2 tabs) now and 2000 mg (2 tabs) in 12 hours. Sick  -     azithromycin (ZITHROMAX) 250 mg tablet; Take 2 tablets today then 1 tablet daily x 4 days          Subjective:      Patient ID: Claude Auerbach is a 48 y.o. male. His T2DM is well controlled on his current regimen. There is little to no risk of hypoglycemia and he has had no such events. His BP is well controlled. He has no CP or SOB. He has no HA or vision changes. His lipids are at goal on his current regimen. He has no myalgia or muscle weakness. He has normal LFTs. His chronic medical conditions are well controlled and he is safe to perform duties of CDL. The following portions of the patient's history were reviewed and updated as appropriate: He  has a past medical history of Asthma, Diabetes mellitus ([address]), Hypertension, Kidney stone, and Renal calculi. He   Patient Active Problem List    Diagnosis Date Noted    Chronic anticoagulation 03/21/2022    Superior mesenteric vein thrombosis ([address]) 02/04/2022    Diabetes mellitus type 2 in obese  02/04/2022    Bronchitis 01/30/2019    Hyperlipidemia 01/30/2019    Injury of right wrist 02/22/2018    Infected cyst of skin 02/22/2018    Hypogonadism, male 09/26/2017    Klinefelter's syndrome 09/25/2017    Sleep disturbances 09/25/2017    Hypertension 08/22/2017    Morbid obesity with body mass index (BMI) of 40.0 or higher ([address]) 08/22/2017    Nephrolithiasis 02/04/2014   . Review of Systems   All other systems reviewed and are negative.         Objective:      /80 (BP Location: Left arm, Patient Position: Sitting)   Pulse 67   Temp 97.8 °F (36.6 °C) (Tympanic)   Ht 6' 1.2" (1.859 m)   Wt 131 kg (289 lb)   SpO2 98%   BMI 37.92 kg/m²          Physical Exam  Vitals and nursing note reviewed. Constitutional:       Appearance: Normal appearance. He is obese. Cardiovascular:      Rate and Rhythm: Normal rate and regular rhythm. Pulses: Normal pulses. no weak pulses          Dorsalis pedis pulses are 2+ on the right side and 2+ on the left side. Posterior tibial pulses are 2+ on the right side and 2+ on the left side. Heart sounds: Normal heart sounds. Pulmonary:      Effort: Pulmonary effort is normal.      Breath sounds: Normal breath sounds. Abdominal:      General: Abdomen is flat. Bowel sounds are normal.      Palpations: Abdomen is soft. Musculoskeletal:         General: Normal range of motion. Cervical back: Normal range of motion and neck supple. Feet:      Right foot:      Skin integrity: No ulcer, skin breakdown, erythema, warmth, callus or dry skin. Left foot:      Skin integrity: No ulcer, skin breakdown, erythema, warmth, callus or dry skin. Skin:     General: Skin is warm and dry. Capillary Refill: Capillary refill takes less than 2 seconds. Neurological:      General: No focal deficit present. Mental Status: He is alert and oriented to person, place, and time. Mental status is at baseline. Psychiatric:         Mood and Affect: Mood normal.         Behavior: Behavior normal.         Thought Content:  Thought content normal.         Judgment: Judgment normal.

## 2023-12-01 NOTE — ASSESSMENT & PLAN NOTE
We discussed discontinuing DOAC. He has no bleeding episodes and would prefer to stay on the medication.

## 2024-01-01 DIAGNOSIS — K55.069 SUPERIOR MESENTERIC VEIN THROMBOSIS (HCC): ICD-10-CM

## 2024-01-01 DIAGNOSIS — E11.9 TYPE 2 DIABETES MELLITUS WITHOUT COMPLICATION, WITHOUT LONG-TERM CURRENT USE OF INSULIN (HCC): ICD-10-CM

## 2024-02-18 DIAGNOSIS — K55.069 SUPERIOR MESENTERIC VEIN THROMBOSIS (HCC): ICD-10-CM

## 2024-02-18 DIAGNOSIS — I10 ESSENTIAL HYPERTENSION: ICD-10-CM

## 2024-02-18 RX ORDER — LOSARTAN POTASSIUM 25 MG/1
25 TABLET ORAL DAILY
Qty: 90 TABLET | Refills: 0 | Status: SHIPPED | OUTPATIENT
Start: 2024-02-18

## 2024-03-24 DIAGNOSIS — E11.9 TYPE 2 DIABETES MELLITUS WITHOUT COMPLICATION, WITHOUT LONG-TERM CURRENT USE OF INSULIN (HCC): ICD-10-CM

## 2024-03-24 RX ORDER — DULAGLUTIDE 1.5 MG/.5ML
INJECTION, SOLUTION SUBCUTANEOUS
Qty: 12 ML | Refills: 0 | Status: SHIPPED | OUTPATIENT
Start: 2024-03-24

## 2024-03-25 DIAGNOSIS — K55.069 SUPERIOR MESENTERIC VEIN THROMBOSIS (HCC): ICD-10-CM

## 2024-03-25 DIAGNOSIS — E11.9 TYPE 2 DIABETES MELLITUS WITHOUT COMPLICATION, WITHOUT LONG-TERM CURRENT USE OF INSULIN (HCC): ICD-10-CM

## 2024-03-25 RX ORDER — DULAGLUTIDE 1.5 MG/.5ML
INJECTION, SOLUTION SUBCUTANEOUS
Qty: 12 ML | Refills: 0 | OUTPATIENT
Start: 2024-03-25

## 2024-04-01 ENCOUNTER — TELEPHONE (OUTPATIENT)
Age: 54
End: 2024-04-01

## 2024-04-01 DIAGNOSIS — E66.9 TYPE 2 DIABETES MELLITUS WITH OBESITY  (HCC): Primary | ICD-10-CM

## 2024-04-01 DIAGNOSIS — E11.69 TYPE 2 DIABETES MELLITUS WITH OBESITY  (HCC): Primary | ICD-10-CM

## 2024-04-01 NOTE — TELEPHONE ENCOUNTER
Patient's wife, montserrat calling, she wants to know if Pedro Pablo can get an alternative to trulicity since it is back ordered.

## 2024-04-02 ENCOUNTER — TELEPHONE (OUTPATIENT)
Dept: FAMILY MEDICINE CLINIC | Facility: CLINIC | Age: 54
End: 2024-04-02

## 2024-04-02 DIAGNOSIS — E11.69 TYPE 2 DIABETES MELLITUS WITH OBESITY  (HCC): Primary | ICD-10-CM

## 2024-04-02 DIAGNOSIS — E66.9 TYPE 2 DIABETES MELLITUS WITH OBESITY  (HCC): Primary | ICD-10-CM

## 2024-04-08 NOTE — TELEPHONE ENCOUNTER
Spoke with patient regarding Trulicity backorder. Last dose of Trulicity was 3/31/24. Missed his injection yesterday because pharmacy could not order medication in.     Discussed options of calling around to different pharmacies or switching to Rybelsus. He is going to call around first and will let me know if he needs to switch to another GLP1.    Pharmacist Tracking Tool  Reason For Outreach: Embedded Pharmacist  Demographics:  Intervention Method: Phone  Type of Intervention: Follow-Up  Topics Addressed: Diabetes  Pharmacologic Interventions: N/A  Non-Pharmacologic Interventions: Care coordination  Time:  Direct Patient Care:  10  mins  Care Coordination:  5  mins  Recommendation Recipient: N/A  Outcome: Pending/ Follow-up Required

## 2024-04-11 NOTE — TELEPHONE ENCOUNTER
Patient still unable to get Trulicity filled or find availability of any doses. He is currently on Tulicity 1.5 mg weekly. He can be converted to an equivalent dose of Rybelsus 14 mg daily.    Plan:  Rx pended for Rybelsus 14 mg daily.   He can go back to Trulicity 1.5 mg weekly once availability at pharmacy returns       Pharmacist Tracking Tool  Reason For Outreach: Embedded Pharmacist  Demographics:  Intervention Method: Video  Type of Intervention: Follow-Up  Topics Addressed: Diabetes  Pharmacologic Interventions: Medication Conversion  Non-Pharmacologic Interventions: N/A  Time:  Direct Patient Care:  5  mins  Care Coordination:  5  mins  Recommendation Recipient: Patient/Caregiver and Provider  Outcome: Accepted

## 2024-04-15 ENCOUNTER — TELEPHONE (OUTPATIENT)
Dept: FAMILY MEDICINE CLINIC | Facility: CLINIC | Age: 54
End: 2024-04-15

## 2024-04-15 ENCOUNTER — TELEPHONE (OUTPATIENT)
Age: 54
End: 2024-04-15

## 2024-04-15 NOTE — TELEPHONE ENCOUNTER
Allison, patient's spouse stated that the medication trulicity is being changed to semaglutide (rybelsus) and was checking on the prior authorization. Patient would like to know if it was approved. Please advise.

## 2024-04-16 NOTE — TELEPHONE ENCOUNTER
PA for Rybelsus 14 MG tablet Approved   Date(s) approved 3/16/24-4/15/25  Case #[id number]    Patient advised by [x] ERCOM Message                      [x] Phone call       Pharmacy advised by []Fax                                     [x]Phone call    Approval letter scanned into Media No

## 2024-04-29 ENCOUNTER — OFFICE VISIT (OUTPATIENT)
Dept: FAMILY MEDICINE CLINIC | Facility: CLINIC | Age: 54
End: 2024-04-29
Payer: COMMERCIAL

## 2024-04-29 VITALS
BODY MASS INDEX: 39.2 KG/M2 | OXYGEN SATURATION: 98 % | DIASTOLIC BLOOD PRESSURE: 72 MMHG | WEIGHT: 295.8 LBS | HEIGHT: 73 IN | SYSTOLIC BLOOD PRESSURE: 110 MMHG | TEMPERATURE: 97 F | HEART RATE: 70 BPM

## 2024-04-29 DIAGNOSIS — Q98.4 KLINEFELTER'S SYNDROME: ICD-10-CM

## 2024-04-29 DIAGNOSIS — K55.069 SUPERIOR MESENTERIC VEIN THROMBOSIS (HCC): ICD-10-CM

## 2024-04-29 DIAGNOSIS — E11.69 TYPE 2 DIABETES MELLITUS WITH OBESITY  (HCC): ICD-10-CM

## 2024-04-29 DIAGNOSIS — E66.9 TYPE 2 DIABETES MELLITUS WITH OBESITY  (HCC): ICD-10-CM

## 2024-04-29 DIAGNOSIS — R42 VERTIGO: Primary | ICD-10-CM

## 2024-04-29 PROCEDURE — 99214 OFFICE O/P EST MOD 30 MIN: CPT | Performed by: FAMILY MEDICINE

## 2024-04-29 PROCEDURE — 3725F SCREEN DEPRESSION PERFORMED: CPT | Performed by: FAMILY MEDICINE

## 2024-04-29 RX ORDER — MECLIZINE HYDROCHLORIDE 25 MG/1
25 TABLET ORAL 3 TIMES DAILY PRN
Qty: 30 TABLET | Refills: 0 | Status: SHIPPED | OUTPATIENT
Start: 2024-04-29

## 2024-04-29 RX ORDER — METHYLPREDNISOLONE 4 MG/1
TABLET ORAL
Qty: 21 EACH | Refills: 0 | Status: SHIPPED | OUTPATIENT
Start: 2024-04-29

## 2024-04-29 NOTE — LETTER
April 29, 2024     Patient: Pedro Pablo Lopez  YOB: 1970  Date of Visit: 4/29/2024      To Whom it May Concern:    Pedro Pablo Lopez is under my professional care. Pedro Pablo was seen in my office on 4/29/2024. Pedro Pablo may return to work on May 2, 2024 .    If you have any questions or concerns, please don't hesitate to call.         Sincerely,          Sandeep Guillen MD        CC: No Recipients   0

## 2024-04-29 NOTE — PROGRESS NOTES
Assessment/Plan:    No problem-specific Assessment & Plan notes found for this encounter.       Diagnoses and all orders for this visit:    Vertigo  -     methylPREDNISolone 4 MG tablet therapy pack; Use as directed on package  -     meclizine (ANTIVERT) 25 mg tablet; Take 1 tablet (25 mg total) by mouth 3 (three) times a day as needed for dizziness    Superior mesenteric vein thrombosis (HCC)    Klinefelter's syndrome    Type 2 diabetes mellitus with obesity  ([facility])          Subjective:      Patient ID: Pedro Pablo Lopez is a 53 y.o. male.    He developed acute episodes of vertigo.  There is some relation to head position.  He had a recent URI.  He has no F/C.  He denies head trauma.  He has no decreased hearing.  He denies other neurologic symptoms.    Dizziness        The following portions of the patient's history were reviewed and updated as appropriate: He  has a past medical history of Asthma, Diabetes mellitus (HCC), Hypertension, Kidney stone, and Renal calculi.  He   Patient Active Problem List    Diagnosis Date Noted    Chronic anticoagulation 03/21/2022    Superior mesenteric vein thrombosis (HCC) 02/04/2022    Type 2 diabetes mellitus with obesity  ([facility]) 02/04/2022    Bronchitis 01/30/2019    Hyperlipidemia 01/30/2019    Injury of right wrist 02/22/2018    Infected cyst of skin 02/22/2018    Hypogonadism, male 09/26/2017    Klinefelter's syndrome 09/25/2017    Sleep disturbances 09/25/2017    Hypertension 08/22/2017    Morbid obesity with body mass index (BMI) of 40.0 or higher ([facility]) 08/22/2017    Nephrolithiasis 02/04/2014     He  has a past surgical history that includes Knee surgery (Right); STONE ANALYSIS (HISTORICAL); TONSILECTOMY AND ADNOIDECTOMY; Cystoscopy w/ ureteral stent placement (02/04/2014); Cystoscopy w/ ureteral stent removal (02/11/2014); and Tonsillectomy.  His family history includes Diabetes in his mother; Hypertension in his father and mother; Stroke in his mother.  He  reports that he  has never smoked. He has never been exposed to tobacco smoke. He uses smokeless tobacco. He reports that he does not currently use alcohol. He reports that he does not use drugs.  Current Outpatient Medications   Medication Sig Dispense Refill    apixaban (Eliquis) 5 mg Take 1 tablet (5 mg total) by mouth 2 (two) times a day 180 tablet 1    losartan (COZAAR) 25 mg tablet Take 1 tablet (25 mg total) by mouth daily 90 tablet 0    meclizine (ANTIVERT) 25 mg tablet Take 1 tablet (25 mg total) by mouth 3 (three) times a day as needed for dizziness 30 tablet 0    methylPREDNISolone 4 MG tablet therapy pack Use as directed on package 21 each 0    semaglutide (Rybelsus) 14 MG tablet Take 1 tablet (14 mg total) by mouth daily before breakfast 30 tablet 2    Syringe, Disposable, (2-3CC SYRINGE) 3 ML MISC Use every 21 days 25 each 0    Trulicity 1.5 MG/0.5ML injection INJECT 0.5ML (1.5MG TOTAL) UNDER THE SKIN EVERY 7 DAYS (Patient not taking: Reported on 4/29/2024) 12 mL 0    valACYclovir (VALTREX) 1,000 mg tablet Take 2000 mg (2 tabs) now and 2000 mg (2 tabs) in 12 hours. (Patient taking differently: as needed Take 2000 mg (2 tabs) now and 2000 mg (2 tabs) in 12 hours.) 4 tablet 5     Current Facility-Administered Medications   Medication Dose Route Frequency Provider Last Rate Last Admin    aluminum-magnesium hydroxide-simethicone (MYLANTA) oral suspension 30 mL  30 mL Oral Q4H PRN JUAN PABLO Rooney        Lidocaine Viscous HCl (XYLOCAINE) 2 % mucosal solution 15 mL  15 mL Swish & Spit 4x Daily PRN JUAN PABLO Rooney         Current Outpatient Medications on File Prior to Visit   Medication Sig    apixaban (Eliquis) 5 mg Take 1 tablet (5 mg total) by mouth 2 (two) times a day    losartan (COZAAR) 25 mg tablet Take 1 tablet (25 mg total) by mouth daily    semaglutide (Rybelsus) 14 MG tablet Take 1 tablet (14 mg total) by mouth daily before breakfast    Syringe, Disposable, (2-3CC SYRINGE) 3 ML MISC Use every 21 days     "Trulicity 1.5 MG/0.5ML injection INJECT 0.5ML (1.5MG TOTAL) UNDER THE SKIN EVERY 7 DAYS (Patient not taking: Reported on 4/29/2024)    valACYclovir (VALTREX) 1,000 mg tablet Take 2000 mg (2 tabs) now and 2000 mg (2 tabs) in 12 hours. (Patient taking differently: as needed Take 2000 mg (2 tabs) now and 2000 mg (2 tabs) in 12 hours.)     Current Facility-Administered Medications on File Prior to Visit   Medication    aluminum-magnesium hydroxide-simethicone (MYLANTA) oral suspension 30 mL    Lidocaine Viscous HCl (XYLOCAINE) 2 % mucosal solution 15 mL     He is allergic to cat hair extract and penicillins..    Review of Systems   Neurological:  Positive for dizziness.   All other systems reviewed and are negative.        Objective:      /72 (BP Location: Left arm, Patient Position: Sitting)   Pulse 70   Temp (!) 97 °F (36.1 °C) (Tympanic)   Ht 6' 1.2\" (1.859 m)   Wt 134 kg (295 lb 12.8 oz)   SpO2 98%   BMI 38.81 kg/m²          Physical Exam  Vitals and nursing note reviewed.   Constitutional:       Appearance: Normal appearance. He is obese.   Cardiovascular:      Rate and Rhythm: Normal rate and regular rhythm.      Pulses: Normal pulses.      Heart sounds: Normal heart sounds.   Pulmonary:      Effort: Pulmonary effort is normal.      Breath sounds: Normal breath sounds.   Abdominal:      General: Abdomen is flat. Bowel sounds are normal.      Palpations: Abdomen is soft.   Musculoskeletal:      Cervical back: Normal range of motion and neck supple.   Skin:     Capillary Refill: Capillary refill takes less than 2 seconds.   Neurological:      General: No focal deficit present.      Mental Status: He is alert and oriented to person, place, and time.      Comments: Nystagmus lateral gaze   Psychiatric:         Mood and Affect: Mood normal.         Behavior: Behavior normal.         Thought Content: Thought content normal.         Judgment: Judgment normal.           "

## 2024-05-08 ENCOUNTER — TELEPHONE (OUTPATIENT)
Dept: FAMILY MEDICINE CLINIC | Facility: CLINIC | Age: 54
End: 2024-05-08

## 2024-05-08 DIAGNOSIS — E11.9 TYPE 2 DIABETES MELLITUS WITHOUT COMPLICATION, WITHOUT LONG-TERM CURRENT USE OF INSULIN (HCC): ICD-10-CM

## 2024-05-08 RX ORDER — DULAGLUTIDE 1.5 MG/.5ML
1.5 INJECTION, SOLUTION SUBCUTANEOUS
Qty: 6 ML | Refills: 1 | Status: SHIPPED | OUTPATIENT
Start: 2024-05-08

## 2024-05-08 NOTE — TELEPHONE ENCOUNTER
Patient not tolerating Rybelsus 14 mg daily. Stomach upset, nausea, no appetite at all. He would like to go back to Trulicity. He was switched from Rybelsus to Trulicity due to medication shortages.     Plan:  Discontinue Rybelsus  Restart Trulcity 1.5 mg weekly    Pharmacist Tracking Tool  Reason For Outreach: Embedded Pharmacist  Demographics:  Intervention Method: Phone  Type of Intervention: Follow-Up  Topics Addressed: Diabetes  Pharmacologic Interventions: Medication Conversion  Non-Pharmacologic Interventions: N/A  Time:  Direct Patient Care:  5  mins  Care Coordination:  5  mins  Recommendation Recipient: Patient/Caregiver and Provider  Outcome: Accepted

## 2024-05-13 ENCOUNTER — TELEPHONE (OUTPATIENT)
Dept: ADMINISTRATIVE | Facility: OTHER | Age: 54
End: 2024-05-13

## 2024-05-13 NOTE — TELEPHONE ENCOUNTER
----- Message from Hyun Galarza MA sent at 5/13/2024 10:53 AM EDT -----  Regarding: Care Gap request  05/13/24 10:53 AM    Hello, our patient attached above has had Diabetic Eye Exam completed/performed. Please assist in updating the patient chart by pulling the document from the Media Tab. The date of service is 2022.     Thank you,  Hyun Galarza  [facility] PRIMARY [facility]

## 2024-05-13 NOTE — TELEPHONE ENCOUNTER
Upon review of the In Basket request we have found/obtained the documentation. After careful review of the document we are unable to complete this request for Diabetic Eye Exam because the documentation does not have the proper verbiage (wording) needed to close the requested care gap(s) and the documentation does not have the result(s) needed to close the requested care gap(s).    Any additional questions or concerns should be emailed to the Practice Liaisons via the appropriate education email address, please do not reply via In Basket.    Thank you  Abhi Govea MA

## 2024-05-17 ENCOUNTER — TELEPHONE (OUTPATIENT)
Dept: ADMINISTRATIVE | Facility: OTHER | Age: 54
End: 2024-05-17

## 2024-05-17 NOTE — TELEPHONE ENCOUNTER
----- Message from Hyun NO sent at 5/16/2024  2:40 PM EDT -----  Regarding: Care Gap request  05/16/24 2:40 PM    Hello, our patient attached above has had Diabetic Eye Exam completed/performed. Please assist in updating the patient chart by pulling the document from the Media Tab. The date of service is 2023.     Thank you,  Hyun MILLER

## 2024-05-19 DIAGNOSIS — I10 ESSENTIAL HYPERTENSION: ICD-10-CM

## 2024-05-19 RX ORDER — LOSARTAN POTASSIUM 25 MG/1
25 TABLET ORAL DAILY
Qty: 90 TABLET | Refills: 1 | Status: SHIPPED | OUTPATIENT
Start: 2024-05-19

## 2024-06-03 NOTE — TELEPHONE ENCOUNTER
Upon review of the In Basket request we have found this is a duplicate request and no further action is needed. This request was completed upon initial request, the patient chart is up to date, and this message will now be closed.    Any additional questions or concerns should be emailed to the Practice Liaisons via the appropriate education email address, please do not reply via In Basket.    Thank you  Whitley Fisher   [facility]

## 2024-06-08 ENCOUNTER — OPTICAL OFFICE (OUTPATIENT)
Dept: URBAN - NONMETROPOLITAN AREA CLINIC 4 | Facility: CLINIC | Age: 54
Setting detail: OPHTHALMOLOGY
End: 2024-06-08
Payer: COMMERCIAL

## 2024-06-08 ENCOUNTER — DOCTOR'S OFFICE (OUTPATIENT)
Dept: URBAN - NONMETROPOLITAN AREA CLINIC 1 | Facility: CLINIC | Age: 54
Setting detail: OPHTHALMOLOGY
End: 2024-06-08
Payer: COMMERCIAL

## 2024-06-08 ENCOUNTER — RX ONLY (RX ONLY)
Age: 54
End: 2024-06-08

## 2024-06-08 DIAGNOSIS — H52.4: ICD-10-CM

## 2024-06-08 DIAGNOSIS — H52.223: ICD-10-CM

## 2024-06-08 DIAGNOSIS — H52.03: ICD-10-CM

## 2024-06-08 PROBLEM — E11.9 TYPE 2 DIABETES MELLITUS WITHOUT COMPLICATIONS: Status: ACTIVE | Noted: 2024-06-08

## 2024-06-08 LAB
LEFT EYE DIABETIC RETINOPATHY: NORMAL
RIGHT EYE DIABETIC RETINOPATHY: NORMAL

## 2024-06-08 PROCEDURE — 92004 COMPRE OPH EXAM NEW PT 1/>: CPT

## 2024-06-08 PROCEDURE — V2025 EYEGLASSES DELUX FRAMES: HCPCS

## 2024-06-08 PROCEDURE — V2203 LENS SPHCYL BIFOCAL 4.00D/.1: HCPCS | Mod: LT

## 2024-06-08 PROCEDURE — V2200 LENS SPHER BIFOC PLANO 4.00D: HCPCS | Mod: RT

## 2024-06-08 PROCEDURE — V2781 PROGRESSIVE LENS PER LENS: HCPCS | Mod: PR

## 2024-06-08 PROCEDURE — 92015 DETERMINE REFRACTIVE STATE: CPT

## 2024-06-08 PROCEDURE — V2784 LENS POLYCARB OR EQUAL: HCPCS | Mod: LT

## 2024-06-08 PROCEDURE — V2020 VISION SVCS FRAMES PURCHASES: HCPCS

## 2024-06-08 PROCEDURE — V2781 PROGRESSIVE LENS PER LENS: HCPCS | Mod: LT

## 2024-06-08 PROCEDURE — V2784 LENS POLYCARB OR EQUAL: HCPCS

## 2024-06-08 ASSESSMENT — CONFRONTATIONAL VISUAL FIELD TEST (CVF)
OD_FINDINGS: FULL
OS_FINDINGS: FULL

## 2024-06-20 ENCOUNTER — VBI (OUTPATIENT)
Dept: ADMINISTRATIVE | Facility: OTHER | Age: 54
End: 2024-06-20

## 2024-06-20 NOTE — TELEPHONE ENCOUNTER
06/20/24 1:51 PM     Chart reviewed for Diabetic Eye Exam ; nothing is submitted to the patient's insurance at this time.     Bernice Rivera   [facility]

## 2024-07-09 ENCOUNTER — TELEPHONE (OUTPATIENT)
Age: 54
End: 2024-07-09

## 2024-07-09 DIAGNOSIS — R69 SICK: Primary | ICD-10-CM

## 2024-07-09 RX ORDER — DOXYCYCLINE 100 MG/1
100 CAPSULE ORAL 2 TIMES DAILY
Qty: 14 CAPSULE | Refills: 0 | Status: SHIPPED | OUTPATIENT
Start: 2024-07-09 | End: 2024-07-16

## 2024-07-09 NOTE — TELEPHONE ENCOUNTER
Patient's spouse Allison contacted the office this morning asking if something could be prescribed for him. He has been experiencing some type of cold since yesterday evening. Was working outside all day Saturday and Sunday. Losing voice, feels cold but body is hot, denies fever, sounded nasally. Allison stated that when spouse gets sick it usually comes on quick. I did advise I would forward message but an appt would most likely need to be made to be further evaluated. She works 7PM-7AM, please contact Pedro Pablo back with provider recommendation.

## 2024-08-05 ENCOUNTER — RA CDI HCC (OUTPATIENT)
Dept: OTHER | Facility: HOSPITAL | Age: 54
End: 2024-08-05

## 2024-08-12 ENCOUNTER — OFFICE VISIT (OUTPATIENT)
Dept: FAMILY MEDICINE CLINIC | Facility: CLINIC | Age: 54
End: 2024-08-12
Payer: COMMERCIAL

## 2024-08-12 ENCOUNTER — TELEPHONE (OUTPATIENT)
Dept: FAMILY MEDICINE CLINIC | Facility: CLINIC | Age: 54
End: 2024-08-12

## 2024-08-12 VITALS
HEIGHT: 74 IN | DIASTOLIC BLOOD PRESSURE: 72 MMHG | TEMPERATURE: 97.4 F | HEART RATE: 69 BPM | WEIGHT: 295 LBS | SYSTOLIC BLOOD PRESSURE: 122 MMHG | BODY MASS INDEX: 37.86 KG/M2 | OXYGEN SATURATION: 98 %

## 2024-08-12 DIAGNOSIS — I10 PRIMARY HYPERTENSION: ICD-10-CM

## 2024-08-12 DIAGNOSIS — G89.29 CHRONIC PAIN OF RIGHT KNEE: Primary | ICD-10-CM

## 2024-08-12 DIAGNOSIS — E11.69 TYPE 2 DIABETES MELLITUS WITH OBESITY  (HCC): ICD-10-CM

## 2024-08-12 DIAGNOSIS — E66.9 TYPE 2 DIABETES MELLITUS WITH OBESITY  (HCC): ICD-10-CM

## 2024-08-12 DIAGNOSIS — E78.5 HYPERLIPIDEMIA, UNSPECIFIED HYPERLIPIDEMIA TYPE: ICD-10-CM

## 2024-08-12 DIAGNOSIS — M25.561 CHRONIC PAIN OF RIGHT KNEE: Primary | ICD-10-CM

## 2024-08-12 DIAGNOSIS — Q98.4 KLINEFELTER'S SYNDROME: ICD-10-CM

## 2024-08-12 DIAGNOSIS — Z00.00 WELL ADULT EXAM: Primary | ICD-10-CM

## 2024-08-12 LAB
CREAT UR-MCNC: 156.3 MG/DL
MICROALBUMIN UR-MCNC: 10.6 MG/L
MICROALBUMIN/CREAT 24H UR: 7 MG/G CREATININE (ref 0–30)
SL AMB POCT HEMOGLOBIN AIC: 5.7 (ref ?–6.5)

## 2024-08-12 PROCEDURE — 99396 PREV VISIT EST AGE 40-64: CPT | Performed by: FAMILY MEDICINE

## 2024-08-12 PROCEDURE — 82570 ASSAY OF URINE CREATININE: CPT | Performed by: FAMILY MEDICINE

## 2024-08-12 PROCEDURE — 82043 UR ALBUMIN QUANTITATIVE: CPT | Performed by: FAMILY MEDICINE

## 2024-08-12 PROCEDURE — 83036 HEMOGLOBIN GLYCOSYLATED A1C: CPT | Performed by: FAMILY MEDICINE

## 2024-08-12 NOTE — PROGRESS NOTES
"Ambulatory Visit  Name: Pedro Pablo Lopez      : 1970      MRN: [id number]  Encounter Provider: Sandeep Guillen MD  Encounter Date: 2024   Encounter department: [facility]    Assessment & Plan   1. Well adult exam  2. Type 2 diabetes mellitus with obesity  (HCC)  -     POCT hemoglobin A1c  -     IRIS Diabetic eye exam  -     Albumin / creatinine urine ratio; Future; Expected date: 2024  -     Albumin / creatinine urine ratio  -     Lipid panel; Future  -     Hemoglobin A1c (w/out EAG); Future  -     Comprehensive metabolic panel; Future; Expected date: 2024  -     TSH, 3rd generation with Free T4 reflex; Future  3. Hyperlipidemia, unspecified hyperlipidemia type  Assessment & Plan:  We discussed the role of statin medications in the treatment of T2DM.  He would like to check his lipids in 6 months.  4. Primary hypertension  Assessment & Plan:  BP at goal.  Continue current.  5. Klinefelter's syndrome    [unfilled]  History of Present Illness     [unfilled]    @[facility]@    Objective     /72 (BP Location: Right arm, Patient Position: Sitting)   Pulse 69   Temp (!) 97.4 °F (36.3 °C) (Tympanic)   Ht 6' 1.5\" (1.867 m)   Wt 134 kg (295 lb)   SpO2 98%   BMI 38.39 kg/m²     [unfilled]  Administrative Statements         Ambulatory Visit  Name: Pedro Pablo Lopez      : 1970      MRN: [id number]  Encounter Provider: Sandeep Guillen MD  Encounter Date: 2024   Encounter department: [facility]    Assessment & Plan   1. Well adult exam  2. Type 2 diabetes mellitus with obesity  (HCC)  -     POCT hemoglobin A1c  -     IRIS Diabetic eye exam  -     Albumin / creatinine urine ratio; Future; Expected date: 2024  -     Albumin / creatinine urine ratio  -     Lipid panel; Future  -     Hemoglobin A1c (w/out EAG); Future  -     Comprehensive metabolic panel; Future; Expected date: 2024  -     TSH, 3rd generation with Free T4 reflex; " Future  3. Hyperlipidemia, unspecified hyperlipidemia type  Assessment & Plan:  We discussed the role of statin medications in the treatment of T2DM.  He would like to check his lipids in 6 months.  4. Primary hypertension  Assessment & Plan:  BP at goal.  Continue current.  5. Klinefelter's syndrome       History of Present Illness     His A1c is at goal.  He has no hypoglycemia or side effects.    His lipids are not at goal.  He is not interested in a statin medication.    His BP is at goal on his current regimen.  He has no CP or SOB.  He has no HA or vision changes.  Assessment & Plan     Healthy male exam.     1. COVID UTD  2. Patient Counseling:  --Nutrition: Stressed importance of moderation in sodium/caffeine intake, saturated fat and cholesterol, caloric balance, sufficient intake of fresh fruits, vegetables, fiber, calcium, iron, and 1 mg of folate supplement per day (for females capable of pregnancy).  --Discussed the issue of estrogen replacement, calcium supplement, and the daily use of baby aspirin.  --Exercise: Stressed the importance of regular exercise.   --Substance Abuse: Discussed cessation/primary prevention of tobacco, alcohol, or other drug use; driving or other dangerous activities under the influence; availability of treatment for abuse.    --Sexuality: Discussed sexually transmitted diseases, partner selection, use of condoms, avoidance of unintended pregnancy  and contraceptive alternatives.   --Injury prevention: Discussed safety belts, safety helmets, smoke detector, smoking near bedding or upholstery.   --Dental health: Discussed importance of regular tooth brushing, flossing, and dental visits.  --Immunizations reviewed.  --Discussed benefits of screening colonoscopy.  --After hours service discussed with patient    3. Discussed the patient's BMI with him.  The BMI is above average; BMI management plan is completed  4. Follow up as needed for acute illness.    Hoda MONTIEL  "John is a 54 y.o. male and is here for a comprehensive physical exam. The patient reports no problems.    Do you take any herbs or supplements that were not prescribed by a doctor? no  Are you taking calcium supplements? no  Are you taking aspirin daily? no     History:  Patient receives prostate care outside our clinic  Date last prostate exam:  n/a  Date last PSA:  n/a        Review of Systems  Do you have pain that bothers you in your daily life? no  Pertinent items are noted in HPI..    Objective     /72 (BP Location: Right arm, Patient Position: Sitting)   Pulse 69   Temp (!) 97.4 °F (36.3 °C) (Tympanic)   Ht 6' 1.5\" (1.867 m)   Wt 134 kg (295 lb)   SpO2 98%   BMI 38.39 kg/m²     General Appearance:    Alert, cooperative, no distress, appears stated age   Head:    Normocephalic, without obvious abnormality, atraumatic   Eyes:    PERRL, conjunctiva/corneas clear, EOM's intact, fundi     benign, both eyes        Ears:    Normal TM's and external ear canals, both ears   Nose:   Nares normal, septum midline, mucosa normal, no drainage    or sinus tenderness   Throat:   Lips, mucosa, and tongue normal; teeth and gums normal   Neck:   Supple, symmetrical, trachea midline, no adenopathy;        thyroid:  No enlargement/tenderness/nodules; no carotid    bruit or JVD   Back:     Symmetric, no curvature, ROM normal, no CVA tenderness   Lungs:     Clear to auscultation bilaterally, respirations unlabored   Chest wall:    No tenderness or deformity   Heart:    Regular rate and rhythm, S1 and S2 normal, no murmur, rub   or gallop   Abdomen:     Soft, non-tender, bowel sounds active all four quadrants,     no masses, no organomegaly           Extremities:   Extremities normal, atraumatic, no cyanosis or edema   Pulses:   2+ and symmetric all extremities   Skin:   Skin color, texture, turgor normal, no rashes or lesions   Lymph nodes:   Cervical, supraclavicular, and axillary nodes normal   Neurologic:   " "CNII-XII intact. Normal strength, sensation and reflexes       throughout   .        Review of Systems  Medical History Reviewed by provider this encounter:       Objective     /72 (BP Location: Right arm, Patient Position: Sitting)   Pulse 69   Temp (!) 97.4 °F (36.3 °C) (Tympanic)   Ht 6' 1.5\" (1.867 m)   Wt 134 kg (295 lb)   SpO2 98%   BMI 38.39 kg/m²     Physical Exam  Administrative Statements         "

## 2024-08-12 NOTE — TELEPHONE ENCOUNTER
Patient stated he forgot to mention it.   He is having knee pain. He had right knee cleaned out 10 years ago and he assuming it needs to be done again since the pain is beginning to start again.

## 2024-08-12 NOTE — ASSESSMENT & PLAN NOTE
We discussed the role of statin medications in the treatment of T2DM.  He would like to check his lipids in 6 months.

## 2024-09-17 DIAGNOSIS — K55.069 SUPERIOR MESENTERIC VEIN THROMBOSIS (HCC): ICD-10-CM

## 2024-09-18 RX ORDER — APIXABAN 5 MG/1
5 TABLET, FILM COATED ORAL 2 TIMES DAILY
Qty: 60 TABLET | Refills: 0 | Status: SHIPPED | OUTPATIENT
Start: 2024-09-18

## 2024-09-24 ENCOUNTER — DOCUMENTATION (OUTPATIENT)
Dept: FAMILY MEDICINE CLINIC | Facility: CLINIC | Age: 54
End: 2024-09-24

## 2024-10-15 DIAGNOSIS — K55.069 SUPERIOR MESENTERIC VEIN THROMBOSIS (HCC): ICD-10-CM

## 2024-10-16 DIAGNOSIS — E11.9 TYPE 2 DIABETES MELLITUS WITHOUT COMPLICATION, WITHOUT LONG-TERM CURRENT USE OF INSULIN (HCC): ICD-10-CM

## 2024-10-16 NOTE — TELEPHONE ENCOUNTER
Nutrition to Support Wound Healing    Eat Protein at Each Meal   Adequate protein intake promotes wound healing since it is the building block for cells and tissues. Good sources of protein include:   Chicken  Poultry  Tofu  Dried Peas, Beans & Lentils    Beef  Nuts  Yogurt  Eggs & Egg Whites    Fish  Milk  Peanut Butter  Cheese/Cottage Cheese      Eat Vitamin C Rich Foods Daily   Vitamin C assists in building new tissue for wound repair. Good sources of vitamin C include:   Oranges/Orange Juice  Strawberries  Broccoli    Pineapple  Cantaloupe  Bell Peppers    Kiwi fruit  Tomatoes  Potatoes      Remember to Include Zinc   Zinc promotes protein and collagen formation during wound healing. While the best sources of zinc are from animal foods, good sources of zinc include:   Fish, Spinach, Egg Yolk, Dried Peas, Beans,   Beef, Dark Meat of Poultry, Bran Cereals, and Lentils     Drink Plenty of Fluids   Fluid is important for the normal functioning of cells. Drink 6 to 8 glasses of water, milk, tea, or other non-caffeinated liquids daily. If you have diabetes, limit/avoid juice and other sweetened drinks. Limit your intake of alcohol and caffeinated beverages.       Nutritional supplements:   Recommend to start using Nestor 2 x per day to aid with wound healing for 1 month. You may purchase Nestor at places such as [employer] and [employer].    Upon review of the In Basket request and the patient's chart, initial outreach has been made via fax, please see Contacts section for details        Att x1 eye    Thank you  Jus Mitchell

## 2024-10-17 RX ORDER — APIXABAN 5 MG/1
5 TABLET, FILM COATED ORAL 2 TIMES DAILY
Qty: 60 TABLET | Refills: 0 | Status: SHIPPED | OUTPATIENT
Start: 2024-10-17

## 2024-11-12 DIAGNOSIS — K55.069 SUPERIOR MESENTERIC VEIN THROMBOSIS (HCC): ICD-10-CM

## 2024-11-12 DIAGNOSIS — I10 ESSENTIAL HYPERTENSION: ICD-10-CM

## 2024-11-13 RX ORDER — LOSARTAN POTASSIUM 25 MG/1
25 TABLET ORAL DAILY
Qty: 90 TABLET | Refills: 0 | Status: SHIPPED | OUTPATIENT
Start: 2024-11-13

## 2024-11-13 RX ORDER — APIXABAN 5 MG/1
5 TABLET, FILM COATED ORAL 2 TIMES DAILY
Qty: 60 TABLET | Refills: 0 | Status: SHIPPED | OUTPATIENT
Start: 2024-11-13

## 2024-11-25 ENCOUNTER — TELEPHONE (OUTPATIENT)
Age: 54
End: 2024-11-25

## 2024-11-25 NOTE — TELEPHONE ENCOUNTER
Patient called requesting refill for   losartan (COZAAR) 25 mg tablet  . Patient made aware medication was refilled on 11/13 for 90 with 0 refills to [facility] pharmacy. Patient instructed to contact the pharmacy to obtain refills of medication. Patient verbalized understanding.

## 2024-12-06 ENCOUNTER — VBI (OUTPATIENT)
Dept: ADMINISTRATIVE | Facility: OTHER | Age: 54
End: 2024-12-06

## 2024-12-06 NOTE — TELEPHONE ENCOUNTER
12/06/24 11:50 AM     Chart reviewed for Diabetic Eye Exam was/were submitted to the patient's insurance.     Bernice Rivera MA   [facility]

## 2024-12-10 DIAGNOSIS — K55.069 SUPERIOR MESENTERIC VEIN THROMBOSIS (HCC): ICD-10-CM

## 2024-12-11 RX ORDER — APIXABAN 5 MG/1
5 TABLET, FILM COATED ORAL 2 TIMES DAILY
Qty: 60 TABLET | Refills: 0 | Status: SHIPPED | OUTPATIENT
Start: 2024-12-11

## 2024-12-27 ENCOUNTER — OFFICE VISIT (OUTPATIENT)
Dept: URGENT CARE | Facility: MEDICAL CENTER | Age: 54
End: 2024-12-27
Payer: COMMERCIAL

## 2024-12-27 ENCOUNTER — TELEMEDICINE (OUTPATIENT)
Dept: FAMILY MEDICINE CLINIC | Facility: CLINIC | Age: 54
End: 2024-12-27
Payer: COMMERCIAL

## 2024-12-27 VITALS
BODY MASS INDEX: 39.4 KG/M2 | RESPIRATION RATE: 18 BRPM | HEART RATE: 65 BPM | WEIGHT: 307 LBS | TEMPERATURE: 98.6 F | SYSTOLIC BLOOD PRESSURE: 130 MMHG | OXYGEN SATURATION: 96 % | HEIGHT: 74 IN | DIASTOLIC BLOOD PRESSURE: 72 MMHG

## 2024-12-27 VITALS — HEIGHT: 74 IN | BODY MASS INDEX: 39.95 KG/M2

## 2024-12-27 DIAGNOSIS — J06.9 UPPER RESPIRATORY TRACT INFECTION, UNSPECIFIED TYPE: Primary | ICD-10-CM

## 2024-12-27 DIAGNOSIS — J06.9 VIRAL UPPER RESPIRATORY TRACT INFECTION: Primary | ICD-10-CM

## 2024-12-27 PROCEDURE — 99213 OFFICE O/P EST LOW 20 MIN: CPT

## 2024-12-27 RX ORDER — PREDNISONE 20 MG/1
40 TABLET ORAL DAILY
Qty: 10 TABLET | Refills: 0 | Status: SHIPPED | OUTPATIENT
Start: 2024-12-27 | End: 2025-01-01

## 2024-12-27 RX ORDER — AZITHROMYCIN 250 MG/1
TABLET, FILM COATED ORAL
Qty: 6 TABLET | Refills: 0 | Status: SHIPPED | OUTPATIENT
Start: 2024-12-27 | End: 2025-01-01

## 2024-12-27 RX ORDER — DULAGLUTIDE 1.5 MG/.5ML
INJECTION, SOLUTION SUBCUTANEOUS
COMMUNITY
Start: 2024-10-17

## 2024-12-27 NOTE — PROGRESS NOTES
"  [facility]        NAME: Pedro Pablo Lopez is a 54 y.o. male  : 1970    MRN: [id number]  DATE: 2024  TIME: 1:01 PM    Assessment and Plan   Viral upper respiratory tract infection [J06.9]  1. Viral upper respiratory tract infection            After listening to the patient clinical history and current course of illness and completing my physical exam, I explained to the patient that I currently suspect his symptoms to be viral, and do not see any sign of bacterial infection.  I explained that it has been less than 24 hours of symptom onset and based off the current severity of symptoms and minimal supportive care he described using, that there were several ways of improving his conditions such as fluticasone nasal spray, Chloraseptic spray, Tessalon Perles, or acetaminophen he can use.  When offering symptomatic care patient states \"yet but that does not solve the problem and he is just going to end up in the ER Monday anyways if he does not get a Z-Wai.\"  I tried to reason with patient stating that viral illnesses do not need antibiotics and explain why but patient replied \"he does not care or need to know\" and that \"only antibiotics help and he doesn't need any nasal sprays.\"  After patient insistently declining any supportive care treatments I offered, I asked if there is anything else I could do for him today or advise him on and he declined since I am not giving him a Z-Wai and stated \" he will know who to blame why he is in the emergency room on Monday.\" Patient said he better not be charged for today's visit and I explained he was still examined and seen today as a service to which the patient proceeded to leave the room and denied my offer of instructions or papers from his visit today.      Patient Instructions   There are no Patient Instructions on file for this visit.      Follow up with PCP in 3-5 days.  Proceed to  ER if symptoms worsen.    Chief Complaint     Chief Complaint " "  Patient presents with    Shortness of Breath     Pt states symptoms began last night Sore Throat, right ear popping, yellow mucus, headache, post nasal drip, Alkaseltzer taken          History of Present Illness       54-year-old male presenting with cold symptoms x 2 days.  Patient reports symptoms started last night around 9 or 10 PM where he noticed at rest he started to develop a sore throat and used [place] with minor relief.  Sore throat he just describes as an aching mild pain and today he is reporting some congestion and a slight cough with popping in his right ear.  Denies any fevers, chills, shortness of breath, difficulty breathing, chest pain, palpitations, nausea, vomiting, diarrhea, abdominal pain, headaches, lightheadedness, or dizziness.  Patient states he came in looking for a \"Z-Wai or antibiotic because he knows if he does not he will be out for over a week.\"  Patient states he would have gone to his primary care physician because that is what he usually does but he was not around this week.  Patient denies any asthma or COPD history.  Patient states he is tolerating foods and liquids well without any change in appetite and drinking more fluids.  When asked about other over-the-counter symptomatic care patient states \"he does not use any of those meds\" or \"put things in his nose.\"        Review of Systems   Review of Systems   Constitutional:  Negative for chills, fatigue and fever.   HENT:  Positive for congestion and sore throat. Negative for ear pain and rhinorrhea.    Respiratory:  Positive for cough. Negative for shortness of breath.    Cardiovascular:  Negative for chest pain and palpitations.   Gastrointestinal:  Negative for abdominal pain, diarrhea, nausea and vomiting.   Musculoskeletal:  Negative for arthralgias and myalgias.   Neurological:  Negative for light-headedness and headaches.         Current Medications       Current Outpatient Medications:     Eliquis 5 MG, Take 1 " tablet by mouth twice daily, Disp: 60 tablet, Rfl: 0    losartan (COZAAR) 25 mg tablet, Take 1 tablet by mouth once daily, Disp: 90 tablet, Rfl: 0    Trulicity 1.5 MG/0.5ML injection, INJECT 0.5 ML (1.5 MG TOTAL) UNDER THE SKIN ONCE EVERY 7 DAYS, Disp: 12 mL, Rfl: 0    Trulicity 1.5 MG/0.5ML SOAJ, INJECT 0.5ML (1.5MG TOTAL) UNDER THE SKIN ONCE EVERY 7 DAYS (Patient not taking: Reported on 12/27/2024), Disp: , Rfl:     azithromycin (Zithromax) 250 mg tablet, Take 2 tablets (500 mg total) by mouth daily for 1 day, THEN 1 tablet (250 mg total) daily for 4 days., Disp: 6 tablet, Rfl: 0    predniSONE 20 mg tablet, Take 2 tablets (40 mg total) by mouth daily for 5 days, Disp: 10 tablet, Rfl: 0    Current Facility-Administered Medications:     aluminum-magnesium hydroxide-simethicone (MYLANTA) oral suspension 30 mL, 30 mL, Oral, Q4H PRN, JUAN PABLO Rooney    Lidocaine Viscous HCl (XYLOCAINE) 2 % mucosal solution 15 mL, 15 mL, Swish & Spit, 4x Daily PRN, JUAN PABLO Rooney    Current Allergies     Allergies as of 12/27/2024 - Reviewed 12/27/2024   Allergen Reaction Noted    Cat hair extract Shortness Of Breath, Itching, and Allergic Rhinitis 06/23/2022    Penicillins GI Intolerance 09/24/2017            The following portions of the patient's history were reviewed and updated as appropriate: allergies, current medications, past family history, past medical history, past social history, past surgical history and problem list.     Past Medical History:   Diagnosis Date    Asthma     Diabetes mellitus (HCC)     Hypertension     Kidney stone     Renal calculi     Last assessed: 1/11/18       Past Surgical History:   Procedure Laterality Date    CYSTOSCOPY W/ URETERAL STENT PLACEMENT  02/04/2014    CYSTOSCOPY W/ URETERAL STENT REMOVAL  02/11/2014    w/urteroscopy w/removal of calculus    KNEE SURGERY Right     STONE ANALYSIS (HISTORICAL)      TONSILECTOMY AND ADNOIDECTOMY      TONSILLECTOMY         Family History  "  Problem Relation Age of Onset    Diabetes Mother         Mellitus    Hypertension Mother     Stroke Mother         Syndrome    Hypertension Father          Medications have been verified.        Objective   /72   Pulse 65   Temp 98.6 °F (37 °C)   Resp 18   Ht 6' 1.5\" (1.867 m)   Wt (!) 139 kg (307 lb)   SpO2 96%   BMI 39.95 kg/m²        Physical Exam     Physical Exam  Vitals and nursing note reviewed.   Constitutional:       General: He is not in acute distress.     Appearance: He is obese. He is not ill-appearing.   HENT:      Head: Normocephalic and atraumatic.      Right Ear: Tympanic membrane, ear canal and external ear normal.      Left Ear: Tympanic membrane, ear canal and external ear normal.      Nose: Congestion present.      Mouth/Throat:      Mouth: Mucous membranes are moist.      Pharynx: Oropharynx is clear. No oropharyngeal exudate or posterior oropharyngeal erythema.   Eyes:      Conjunctiva/sclera: Conjunctivae normal.      Pupils: Pupils are equal, round, and reactive to light.   Cardiovascular:      Rate and Rhythm: Normal rate and regular rhythm.      Pulses: Normal pulses.      Heart sounds: Normal heart sounds.   Pulmonary:      Effort: Pulmonary effort is normal. No respiratory distress.      Breath sounds: Normal breath sounds. No stridor. No wheezing, rhonchi or rales.   Abdominal:      General: Abdomen is flat. Bowel sounds are normal.      Palpations: Abdomen is soft.      Tenderness: There is no abdominal tenderness.   Lymphadenopathy:      Cervical: No cervical adenopathy.   Skin:     General: Skin is warm and dry.      Capillary Refill: Capillary refill takes less than 2 seconds.   Neurological:      General: No focal deficit present.      Mental Status: He is alert and oriented to person, place, and time.   Psychiatric:         Mood and Affect: Mood normal.         Behavior: Behavior normal.                   "

## 2024-12-27 NOTE — PROGRESS NOTES
Virtual Regular Visit  Name: Pedro Pablo Lopez      : 1970      MRN: [id number]  Encounter Provider: Evaristo Barron PA-C  Encounter Date: 2024   Encounter department: [facility]      Verification of patient location:  Patient is located at Home in the following state in which I hold an active license [place] :  Assessment & Plan  Upper respiratory tract infection, unspecified type  Will prescribe zpak and prednisone burst.  Educated on side effects of medication.  Educated to get OTC covid test and let office know if positive, and to stop abx if positive.  Recommend treatment with over-the-counter and home remedies.  Discussed recommendation for nasal congestion using Sudafed.  For cough and chest congestion using Mucinex twice a day or honey as a natural cough suppressant.  For headaches, sinus pressure/pain can use Tylenol or ibuprofen.  For runny nose can use Flonase nasal spray, antihistamine nasal spray as well as Zyrtec/Allegra/Claritin.  Recommend hydration and adequate nutrition.  Discussed the anticipated course of upper respiratory infection.  Patient to follow-up if symptoms worsen or do not improve as discussed.    Orders:    azithromycin (Zithromax) 250 mg tablet; Take 2 tablets (500 mg total) by mouth daily for 1 day, THEN 1 tablet (250 mg total) daily for 4 days.    predniSONE 20 mg tablet; Take 2 tablets (40 mg total) by mouth daily for 5 days        Encounter provider Evaristo Barron PA-C    The patient was identified by name and date of birth. Pedro Pablo Lopez was informed that this is a telemedicine visit and that the visit is being conducted through the Epic Embedded platform. He agrees to proceed..  My office door was closed. No one else was in the room.  He acknowledged consent and understanding of privacy and security of the video platform. The patient has agreed to participate and understands they can discontinue the visit at any time.    Patient is aware this is  a billable service.     History of Present Illness     Pt is a 53yo male presenting for URI symptoms for 2 days.    URI   This is a new problem. The current episode started yesterday. The problem has been gradually worsening. There has been no fever. Associated symptoms include congestion, coughing, ear pain, a plugged ear sensation, rhinorrhea, sinus pain and a sore throat. Pertinent negatives include no abdominal pain, chest pain, diarrhea, dysuria, headaches, joint pain, joint swelling, nausea, neck pain, rash, sneezing, swollen glands, vomiting or wheezing. He has tried nothing for the symptoms.     Review of Systems   Constitutional:  Negative for appetite change, chills, diaphoresis, fatigue and fever.   HENT:  Positive for congestion, ear pain, postnasal drip, rhinorrhea, sinus pressure, sinus pain and sore throat. Negative for ear discharge and sneezing.    Eyes:  Negative for pain, discharge, redness, itching and visual disturbance.   Respiratory:  Positive for cough and chest tightness. Negative for apnea, shortness of breath and wheezing.    Cardiovascular:  Negative for chest pain, palpitations and leg swelling.   Gastrointestinal:  Negative for abdominal pain, blood in stool, constipation, diarrhea, nausea and vomiting.   Endocrine: Negative for cold intolerance, heat intolerance, polydipsia and polyuria.   Genitourinary:  Negative for dysuria, flank pain, frequency, hematuria and urgency.   Musculoskeletal:  Negative for arthralgias, back pain, joint pain, myalgias, neck pain and neck stiffness.   Skin:  Negative for color change and rash.   Allergic/Immunologic: Negative.    Neurological:  Negative for dizziness, tremors, seizures, syncope, facial asymmetry, speech difficulty, weakness, light-headedness, numbness and headaches.   Hematological:  Negative for adenopathy. Does not bruise/bleed easily.   Psychiatric/Behavioral:  Negative for agitation, confusion, decreased concentration, dysphoric mood,  "hallucinations, self-injury, sleep disturbance and suicidal ideas. The patient is not nervous/anxious and is not hyperactive.    All other systems reviewed and are negative.      Objective   Ht 6' 1.5\" (1.867 m)   BMI 39.95 kg/m²     Physical Exam  Constitutional:       General: He is not in acute distress.     Appearance: Normal appearance. He is not ill-appearing, toxic-appearing or diaphoretic.   Pulmonary:      Effort: Pulmonary effort is normal. No respiratory distress.   Neurological:      General: No focal deficit present.      Mental Status: He is alert and oriented to person, place, and time. Mental status is at baseline.   Psychiatric:         Mood and Affect: Mood normal.         Behavior: Behavior normal.         Thought Content: Thought content normal.         Judgment: Judgment normal.     Unable to complete full PE due to virtual appt.     Visit Time  Total Visit Duration: 4min  "

## 2024-12-28 ENCOUNTER — APPOINTMENT (EMERGENCY)
Dept: RADIOLOGY | Facility: HOSPITAL | Age: 54
End: 2024-12-28
Payer: COMMERCIAL

## 2024-12-28 ENCOUNTER — HOSPITAL ENCOUNTER (EMERGENCY)
Facility: HOSPITAL | Age: 54
Discharge: HOME | End: 2024-12-28
Attending: EMERGENCY MEDICINE | Admitting: EMERGENCY MEDICINE
Payer: COMMERCIAL

## 2024-12-28 VITALS
RESPIRATION RATE: 18 BRPM | HEART RATE: 74 BPM | OXYGEN SATURATION: 93 % | SYSTOLIC BLOOD PRESSURE: 105 MMHG | BODY MASS INDEX: 40.69 KG/M2 | TEMPERATURE: 99.2 F | DIASTOLIC BLOOD PRESSURE: 57 MMHG | HEIGHT: 73 IN | WEIGHT: 307 LBS

## 2024-12-28 DIAGNOSIS — J06.9 UPPER RESPIRATORY INFECTION: Primary | ICD-10-CM

## 2024-12-28 DIAGNOSIS — R05.9 COUGH: ICD-10-CM

## 2024-12-28 LAB
FLUAV RNA RESP QL NAA+PROBE: NEGATIVE
FLUBV RNA RESP QL NAA+PROBE: NEGATIVE
RSV RNA RESP QL NAA+PROBE: NEGATIVE
SARS-COV-2 RNA RESP QL NAA+PROBE: NEGATIVE

## 2024-12-28 PROCEDURE — 94640 AIRWAY INHALATION TREATMENT: CPT

## 2024-12-28 PROCEDURE — 99284 EMERGENCY DEPT VISIT MOD MDM: CPT | Performed by: EMERGENCY MEDICINE

## 2024-12-28 PROCEDURE — 71046 X-RAY EXAM CHEST 2 VIEWS: CPT

## 2024-12-28 PROCEDURE — 0241U HB NFCT DS VIR RESP RNA 4 TRGT: CPT | Performed by: EMERGENCY MEDICINE

## 2024-12-28 PROCEDURE — 99284 EMERGENCY DEPT VISIT MOD MDM: CPT

## 2024-12-28 RX ORDER — IPRATROPIUM BROMIDE AND ALBUTEROL SULFATE 2.5; .5 MG/3ML; MG/3ML
3 SOLUTION RESPIRATORY (INHALATION) ONCE
Status: COMPLETED | OUTPATIENT
Start: 2024-12-28 | End: 2024-12-28

## 2024-12-28 RX ORDER — ACETAMINOPHEN 325 MG/1
975 TABLET ORAL ONCE
Status: COMPLETED | OUTPATIENT
Start: 2024-12-28 | End: 2024-12-28

## 2024-12-28 RX ORDER — FLUTICASONE PROPIONATE 50 MCG
1 SPRAY, SUSPENSION (ML) NASAL DAILY
Status: DISCONTINUED | OUTPATIENT
Start: 2024-12-28 | End: 2024-12-28 | Stop reason: HOSPADM

## 2024-12-28 RX ORDER — FLUTICASONE PROPIONATE 50 MCG
1 SPRAY, SUSPENSION (ML) NASAL DAILY
Qty: 16 G | Refills: 0 | Status: SHIPPED | OUTPATIENT
Start: 2024-12-28

## 2024-12-28 RX ORDER — IPRATROPIUM BROMIDE AND ALBUTEROL SULFATE 2.5; .5 MG/3ML; MG/3ML
3 SOLUTION RESPIRATORY (INHALATION) 4 TIMES DAILY
Qty: 360 ML | Refills: 0 | Status: SHIPPED | OUTPATIENT
Start: 2024-12-28

## 2024-12-28 RX ADMIN — FLUTICASONE PROPIONATE 1 SPRAY: 50 SPRAY, METERED NASAL at 11:41

## 2024-12-28 RX ADMIN — ACETAMINOPHEN 975 MG: 325 TABLET, FILM COATED ORAL at 11:41

## 2024-12-28 RX ADMIN — IPRATROPIUM BROMIDE AND ALBUTEROL SULFATE 3 ML: .5; 3 SOLUTION RESPIRATORY (INHALATION) at 11:41

## 2024-12-28 NOTE — ED PROVIDER NOTES
Time reflects when diagnosis was documented in both MDM as applicable and the Disposition within this note       Time User Action Codes Description Comment    12/28/2024  1:04 PM Katlin Hernandez Add [J06.9] Upper respiratory infection     12/28/2024  1:04 PM Katlin Hernandez Add [R05.9] Cough           ED Disposition       ED Disposition   Discharge    Condition   Stable    Date/Time   Sat Dec 28, 2024  1:04 PM    Comment   Pedro Pablo Lopez discharge to home/self care.                   Assessment & Plan       Medical Decision Making  Amount and/or Complexity of Data Reviewed  Radiology: ordered.    Risk  OTC drugs.  Prescription drug management.      54-year-old male presenting for evaluation of cough and congestion.   Patient does have mild diffuse end expiratory wheezing, vitals normal.   Likely viral syndrome.  Will treat symptomatically with Flonase, breathing treatment and Tylenol.  Will obtain chest x-ray to evaluate for pneumonia.  Will obtain viral swab.  Will reassess.  Reviewed and interpreted x-ray, no acute cardiac disease.  Viral swab negative.  Reassessed patient, he states symptoms are improving.  He does have a nebulizer at home, will prescribe albuterol solution to be used as needed for wheezing.  Will have patient continue symptomatic treatment with Tylenol for pain, Flonase for nasal congestion.  Patient is on Z-Wai, will have him continue this for bronchitis.  Discussed with patient strict return precautions.  Patient expressed understanding and was agreeable for discharge.         Medications   acetaminophen (TYLENOL) tablet 975 mg (975 mg Oral Given 12/28/24 1141)   ipratropium-albuterol (DUO-NEB) 0.5-2.5 mg/3 mL inhalation solution 3 mL (3 mL Nebulization Given 12/28/24 1141)       ED Risk Strat Scores                          SBIRT 22yo+      Flowsheet Row Most Recent Value   Initial Alcohol Screen: US AUDIT-C     1. How often do you have a drink containing alcohol? 0 Filed at: 12/28/2024 1636    2. How many drinks containing alcohol do you have on a typical day you are drinking?  0 Filed at: 12/28/2024 1117   3a. Male UNDER 65: How often do you have five or more drinks on one occasion? 0 Filed at: 12/28/2024 1117   Audit-C Score 0 Filed at: 12/28/2024 1117                            History of Present Illness       Chief Complaint   Patient presents with    Cough     Cough started Tuesday, head feels full, has headache. Cough takes breath away. Threw up today after coughing fit. No fever currently, had sweats and chills. Amoxicillin 250mg started yesterday. Taking mucinex and tylenol for cough and headache.        Past Medical History:   Diagnosis Date    Asthma     Diabetes mellitus (HCC)     Hypertension     Kidney stone     Renal calculi     Last assessed: 1/11/18      Past Surgical History:   Procedure Laterality Date    CYSTOSCOPY W/ URETERAL STENT PLACEMENT  02/04/2014    CYSTOSCOPY W/ URETERAL STENT REMOVAL  02/11/2014    w/urteroscopy w/removal of calculus    KNEE SURGERY Right     STONE ANALYSIS (HISTORICAL)      TONSILECTOMY AND ADNOIDECTOMY      TONSILLECTOMY        Family History   Problem Relation Age of Onset    Diabetes Mother         Mellitus    Hypertension Mother     Stroke Mother         Syndrome    Hypertension Father       Social History     Tobacco Use    Smoking status: Never     Passive exposure: Never    Smokeless tobacco: Current    Tobacco comments:     Zin 2 mg   Vaping Use    Vaping status: Never Used   Substance Use Topics    Alcohol use: Not Currently     Alcohol/week: 0.0 standard drinks of alcohol     Comment: Per [employer]: Occasional alcohol use    Drug use: No      E-Cigarette/Vaping    E-Cigarette Use Never User       E-Cigarette/Vaping Substances    Nicotine No     THC No     CBD No     Flavoring No     Other No     Unknown No       I have reviewed and agree with the history as documented.     HPI    54-year-old male presenting for evaluation of cough and congestion.   Patient has had symptoms for the past few days.  He states he has had a cough productive of mucus.  He has also had nasal congestion and sinus pressure as well as a headache.  He states he has shortness of breath when he has coughing fits.  Denies any chest pain.  Denies abdominal pain, nausea, vomiting, or diarrhea.  Denies leg pain or leg swelling.  Denies fevers but states she has had chills.  He was seen by his primary care doctor yesterday who prescribed him azithromycin.    Review of Systems   Constitutional:  Positive for appetite change and chills. Negative for fever.   HENT:  Negative for congestion, rhinorrhea and sore throat.    Respiratory:  Positive for cough and shortness of breath.    Cardiovascular:  Negative for chest pain and leg swelling.   Gastrointestinal:  Negative for abdominal pain, diarrhea, nausea and vomiting.   Genitourinary:  Negative for dysuria, frequency, hematuria and urgency.   Musculoskeletal:  Negative for arthralgias and myalgias.   Skin:  Negative for rash.   Neurological:  Positive for headaches. Negative for dizziness, weakness, light-headedness and numbness.   All other systems reviewed and are negative.          Objective       ED Triage Vitals [12/28/24 1118]   Temperature Pulse Blood Pressure Respirations SpO2 Patient Position - Orthostatic VS   99.6 °F (37.6 °C) 82 145/71 16 95 % Sitting      Temp Source Heart Rate Source BP Location FiO2 (%) Pain Score    Temporal Monitor Right arm -- No Pain      Vitals      Date and Time Temp Pulse SpO2 Resp BP Pain Score FACES Pain Rating User   12/28/24 1351 99.2 °F (37.3 °C) 74 93 % 18 105/57 No Pain --    12/28/24 1230 99 °F (37.2 °C) 86 95 % 18 131/65 No Pain --    12/28/24 1141 -- -- -- -- -- No Pain --    12/28/24 1120 99.6 °F (37.6 °C) -- -- -- -- -- --    12/28/24 1118 -- 82 95 % 16 145/71 No Pain -- RJY   12/28/24 1118 99.6 °F (37.6 °C) -- -- -- -- -- --             Physical Exam  Vitals and nursing note reviewed.    Constitutional:       General: He is not in acute distress.     Appearance: Normal appearance. He is well-developed. He is obese. He is not ill-appearing, toxic-appearing or diaphoretic.   HENT:      Head: Normocephalic and atraumatic.      Right Ear: External ear normal.      Left Ear: External ear normal.      Nose: Congestion present. No rhinorrhea.      Mouth/Throat:      Mouth: Mucous membranes are moist.      Pharynx: Oropharynx is clear.   Eyes:      Extraocular Movements: Extraocular movements intact.      Conjunctiva/sclera: Conjunctivae normal.   Cardiovascular:      Rate and Rhythm: Normal rate and regular rhythm.      Pulses: Normal pulses.      Heart sounds: Normal heart sounds. No murmur heard.     No friction rub. No gallop.   Pulmonary:      Effort: Pulmonary effort is normal. No respiratory distress.      Breath sounds: Wheezing present. No rales.   Abdominal:      General: There is no distension.      Palpations: Abdomen is soft.      Tenderness: There is no abdominal tenderness. There is no guarding or rebound.   Musculoskeletal:         General: No tenderness.      Cervical back: Neck supple.      Right lower leg: No edema.      Left lower leg: No edema.   Skin:     General: Skin is warm and dry.      Coloration: Skin is not pale.      Findings: No rash.   Neurological:      General: No focal deficit present.      Mental Status: He is alert and oriented to person, place, and time.      Cranial Nerves: No cranial nerve deficit.      Sensory: No sensory deficit.      Motor: No weakness.   Psychiatric:         Mood and Affect: Mood normal.         Behavior: Behavior normal.         Results Reviewed       Procedure Component Value Units Date/Time    FLU/RSV/COVID - if FLU/RSV clinically relevant (2hr TAT) [[id number]]  (Normal) Collected: 12/28/24 1140    Lab Status: Final result Specimen: Nares from Nasopharyngeal Swab Updated: 12/28/24 1254     SARS-CoV-2 Negative     INFLUENZA A PCR Negative      INFLUENZA B PCR Negative     RSV PCR Negative    Narrative:      This test has been performed using the CoV-2/Flu/RSV plus assay on the [employer] GeneXpert platform. This test has been validated by the  and verified by the performing laboratory.     This test is designed to amplify and detect the following: nucleocapsid (N), envelope (E), and RNA-dependent RNA polymerase (RdRP) genes of the SARS-CoV-2 genome; matrix (M), basic polymerase (PB2), and acidic protein (PA) segments of the influenza A genome; matrix (M) and non-structural protein (NS) segments of the influenza B genome, and the nucleocapsid genes of RSV A and RSV B.     Positive results are indicative of the presence of Flu A, Flu B, RSV, and/or SARS-CoV-2 RNA. Positive results for SARS-CoV-2 or suspected novel influenza should be reported to state, local, or federal health departments according to local reporting requirements.      All results should be assessed in conjunction with clinical presentation and other laboratory markers for clinical management.     FOR PEDIATRIC PATIENTS - copy/paste COVID Guidelines URL to browser: [email or web address]               XR chest 2 views    (Results Pending)       Procedures    ED Medication and Procedure Management   Prior to Admission Medications   Prescriptions Last Dose Informant Patient Reported? Taking?   Eliquis 5 MG 12/28/2024 Morning  No Yes   Sig: Take 1 tablet by mouth twice daily   Trulicity 1.5 MG/0.5ML SOAJ   Yes No   Sig: INJECT 0.5ML (1.5MG TOTAL) UNDER THE SKIN ONCE EVERY 7 DAYS   Patient not taking: Reported on 12/27/2024   Trulicity 1.5 MG/0.5ML injection Past Week  No Yes   Sig: INJECT 0.5 ML (1.5 MG TOTAL) UNDER THE SKIN ONCE EVERY 7 DAYS   azithromycin (Zithromax) 250 mg tablet 12/28/2024 Morning  No Yes   Sig: Take 2 tablets (500 mg total) by mouth daily for 1 day, THEN 1 tablet (250 mg total) daily for 4 days.   losartan  (COZAAR) 25 mg tablet 12/28/2024 Morning  No Yes   Sig: Take 1 tablet by mouth once daily   predniSONE 20 mg tablet 12/28/2024 Morning  No Yes   Sig: Take 2 tablets (40 mg total) by mouth daily for 5 days      Facility-Administered Medications Last Administration Doses Remaining   Lidocaine Viscous HCl (XYLOCAINE) 2 % mucosal solution 15 mL None recorded    aluminum-magnesium hydroxide-simethicone (MYLANTA) oral suspension 30 mL None recorded         Discharge Medication List as of 12/28/2024  1:06 PM        START taking these medications    Details   fluticasone (FLONASE) 50 mcg/act nasal spray 1 spray into each nostril daily, Starting Sat 12/28/2024, Normal      ipratropium-albuterol (DUO-NEB) 0.5-2.5 mg/3 mL nebulizer solution Take 3 mL by nebulization 4 (four) times a day, Starting Sat 12/28/2024, Normal           CONTINUE these medications which have NOT CHANGED    Details   azithromycin (Zithromax) 250 mg tablet Multiple Dosages:Starting Fri 12/27/2024, Until Fri 12/27/2024 at 2359, THEN Starting Sat 12/28/2024, Until Tue 12/31/2024 at 2359Take 2 tablets (500 mg total) by mouth daily for 1 day, THEN 1 tablet (250 mg total) daily for 4 days., Normal      Eliquis 5 MG Take 1 tablet by mouth twice daily, Starting Wed 12/11/2024, Normal      losartan (COZAAR) 25 mg tablet Take 1 tablet by mouth once daily, Starting Wed 11/13/2024, Normal      predniSONE 20 mg tablet Take 2 tablets (40 mg total) by mouth daily for 5 days, Starting Fri 12/27/2024, Until Wed 1/1/2025, Normal      Trulicity 1.5 MG/0.5ML injection INJECT 0.5 ML (1.5 MG TOTAL) UNDER THE SKIN ONCE EVERY 7 DAYS, Normal      Trulicity 1.5 MG/0.5ML SOAJ INJECT 0.5ML (1.5MG TOTAL) UNDER THE SKIN ONCE EVERY 7 DAYS, Historical Med           No discharge procedures on file.  ED SEPSIS DOCUMENTATION   Time reflects when diagnosis was documented in both MDM as applicable and the Disposition within this note       Time User Action Codes Description Comment     12/28/2024  1:04 PM Katlin Hernandez [J06.9] Upper respiratory infection     12/28/2024  1:04 PM Katlin Hernandez [R05.9] Cough                  Katlin Hernandez MD  12/28/24 2937

## 2024-12-28 NOTE — DISCHARGE INSTRUCTIONS
Please use nebulizer every 3-4 hours as needed for shortness of breath or wheezing.     You may use flonase daily for nasal congestion. You should complete your course of azithromycin as previously prescribed.

## 2025-01-04 DIAGNOSIS — E11.9 TYPE 2 DIABETES MELLITUS WITHOUT COMPLICATION, WITHOUT LONG-TERM CURRENT USE OF INSULIN (HCC): ICD-10-CM

## 2025-01-05 RX ORDER — DULAGLUTIDE 1.5 MG/.5ML
INJECTION, SOLUTION SUBCUTANEOUS
Qty: 12 ML | Refills: 0 | Status: SHIPPED | OUTPATIENT
Start: 2025-01-05

## 2025-01-08 DIAGNOSIS — K55.069 SUPERIOR MESENTERIC VEIN THROMBOSIS (HCC): ICD-10-CM

## 2025-01-09 RX ORDER — APIXABAN 5 MG/1
5 TABLET, FILM COATED ORAL 2 TIMES DAILY
Qty: 60 TABLET | Refills: 0 | Status: SHIPPED | OUTPATIENT
Start: 2025-01-09

## 2025-02-08 DIAGNOSIS — K55.069 SUPERIOR MESENTERIC VEIN THROMBOSIS (HCC): ICD-10-CM

## 2025-02-08 DIAGNOSIS — I10 ESSENTIAL HYPERTENSION: ICD-10-CM

## 2025-02-10 RX ORDER — LOSARTAN POTASSIUM 25 MG/1
25 TABLET ORAL DAILY
Qty: 90 TABLET | Refills: 1 | Status: SHIPPED | OUTPATIENT
Start: 2025-02-10

## 2025-02-10 RX ORDER — APIXABAN 5 MG/1
5 TABLET, FILM COATED ORAL 2 TIMES DAILY
Qty: 180 TABLET | Refills: 1 | Status: SHIPPED | OUTPATIENT
Start: 2025-02-10

## 2025-03-27 DIAGNOSIS — E11.9 TYPE 2 DIABETES MELLITUS WITHOUT COMPLICATION, WITHOUT LONG-TERM CURRENT USE OF INSULIN (HCC): ICD-10-CM

## 2025-03-27 RX ORDER — DULAGLUTIDE 1.5 MG/.5ML
INJECTION, SOLUTION SUBCUTANEOUS
Qty: 12 ML | Refills: 0 | OUTPATIENT
Start: 2025-03-27

## 2025-03-28 ENCOUNTER — HOSPITAL ENCOUNTER (EMERGENCY)
Facility: HOSPITAL | Age: 55
Discharge: HOME | End: 2025-03-28
Attending: EMERGENCY MEDICINE
Payer: COMMERCIAL

## 2025-03-28 VITALS
DIASTOLIC BLOOD PRESSURE: 95 MMHG | SYSTOLIC BLOOD PRESSURE: 163 MMHG | OXYGEN SATURATION: 99 % | RESPIRATION RATE: 18 BRPM | HEART RATE: 78 BPM | TEMPERATURE: 96.9 F

## 2025-03-28 DIAGNOSIS — K08.89 TOOTHACHE: ICD-10-CM

## 2025-03-28 DIAGNOSIS — K02.9 PAIN DUE TO DENTAL CARIES: Primary | ICD-10-CM

## 2025-03-28 PROCEDURE — 99282 EMERGENCY DEPT VISIT SF MDM: CPT

## 2025-03-28 PROCEDURE — 99285 EMERGENCY DEPT VISIT HI MDM: CPT | Performed by: EMERGENCY MEDICINE

## 2025-03-28 RX ORDER — OXYCODONE AND ACETAMINOPHEN 5; 325 MG/1; MG/1
1 TABLET ORAL ONCE
Refills: 0 | Status: COMPLETED | OUTPATIENT
Start: 2025-03-28 | End: 2025-03-28

## 2025-03-28 RX ORDER — CHLORHEXIDINE GLUCONATE ORAL RINSE 1.2 MG/ML
15 SOLUTION DENTAL 2 TIMES DAILY
Qty: 120 ML | Refills: 0 | Status: SHIPPED | OUTPATIENT
Start: 2025-03-28

## 2025-03-28 RX ORDER — LIDOCAINE HYDROCHLORIDE 20 MG/ML
15 SOLUTION OROPHARYNGEAL ONCE
Status: COMPLETED | OUTPATIENT
Start: 2025-03-28 | End: 2025-03-28

## 2025-03-28 RX ORDER — CLINDAMYCIN HYDROCHLORIDE 150 MG/1
450 CAPSULE ORAL ONCE
Status: COMPLETED | OUTPATIENT
Start: 2025-03-28 | End: 2025-03-28

## 2025-03-28 RX ORDER — LIDOCAINE HYDROCHLORIDE 20 MG/ML
15 SOLUTION OROPHARYNGEAL 4 TIMES DAILY PRN
Qty: 100 ML | Refills: 0 | Status: SHIPPED | OUTPATIENT
Start: 2025-03-28

## 2025-03-28 RX ORDER — IBUPROFEN 600 MG/1
600 TABLET, FILM COATED ORAL EVERY 6 HOURS PRN
Qty: 30 TABLET | Refills: 0 | Status: SHIPPED | OUTPATIENT
Start: 2025-03-28

## 2025-03-28 RX ORDER — CLINDAMYCIN HYDROCHLORIDE 300 MG/1
300 CAPSULE ORAL 4 TIMES DAILY
Qty: 28 CAPSULE | Refills: 0 | Status: SHIPPED | OUTPATIENT
Start: 2025-03-28 | End: 2025-04-04

## 2025-03-28 RX ORDER — OXYCODONE HYDROCHLORIDE 5 MG/1
5 TABLET ORAL EVERY 6 HOURS PRN
Qty: 8 TABLET | Refills: 0 | Status: SHIPPED | OUTPATIENT
Start: 2025-03-28

## 2025-03-28 RX ADMIN — OXYCODONE HYDROCHLORIDE AND ACETAMINOPHEN 1 TABLET: 5; 325 TABLET ORAL at 20:11

## 2025-03-28 RX ADMIN — CLINDAMYCIN HYDROCHLORIDE 450 MG: 150 CAPSULE ORAL at 20:10

## 2025-03-28 RX ADMIN — LIDOCAINE HYDROCHLORIDE 15 ML: 20 SOLUTION ORAL at 20:11

## 2025-03-29 NOTE — ED PROVIDER NOTES
Time reflects when diagnosis was documented in both MDM as applicable and the Disposition within this note       Time User Action Codes Description Comment    3/28/2025  7:58 PM Leon Yanez [K02.9] Pain due to dental caries     3/28/2025  7:58 PM Leon Yanez Add [K08.89] Toothache           ED Disposition       ED Disposition   Discharge    Condition   Stable    Date/Time   Fri Mar 28, 2025  8:00 PM    Comment   Pedro Pablo Lopez discharge to home/self care.                   Assessment & Plan       Medical Decision Making  54-year-old male presenting with dental pain.  Differential diagnosis includes tooth fracture, dental caries, periapical abscess, ANUG.  Clinical exam is consistent with a chronically decayed tooth with historical evidence of recent fracture.  Due to severe dental decay not readily amenable to calcium hydroxide/dental cement as I feel that this will not adequately cover the area as the remaining tooth is quite poor as indicated.  No evidence of an Mejias 3 dental fracture.  Will manage with temporizing measures include clindamycin, Peridex, Motrin, oxycodone for severe/breakthrough pain.  Patient has outpatient follow-up with dentistry coming.  Return to ER precautions discussed.  No clinical evidence of airway compromise or Ronald angina or necrotizing infection.    Problems Addressed:  Pain due to dental caries: acute illness or injury  Toothache: acute illness or injury    Risk  Prescription drug management.             Medications   Lidocaine Viscous HCl (XYLOCAINE) 2 % mucosal solution 15 mL (has no administration in time range)   oxyCODONE-acetaminophen (PERCOCET) 5-325 mg per tablet 1 tablet (has no administration in time range)   clindamycin (CLEOCIN) capsule 450 mg (has no administration in time range)       ED Risk Strat Scores                            SBIRT 20yo+      Flowsheet Row Most Recent Value   Initial Alcohol Screen: US AUDIT-C     1. How often do you have a  drink containing alcohol? 0 Filed at: 03/28/2025 1931   2. How many drinks containing alcohol do you have on a typical day you are drinking?  0 Filed at: 03/28/2025 1931   3a. Male UNDER 65: How often do you have five or more drinks on one occasion? 0 Filed at: 03/28/2025 1931   3b. FEMALE Any Age, or MALE 65+: How often do you have 4 or more drinks on one occassion? 0 Filed at: 03/28/2025 1931   Audit-C Score 0 Filed at: 03/28/2025 1931   VIVIAN: How many times in the past year have you...    Used an illegal drug or used a prescription medication for non-medical reasons? Never Filed at: 03/28/2025 1931                            History of Present Illness       Chief Complaint   Patient presents with    Dental Pain     Chipped tooth on bottom left side of mouth on Monday. Painful, starting to swell. Has been trying to deal with at home but hurts too bad. Dentist can't get him in for 2 weeks.        Past Medical History:   Diagnosis Date    Asthma     Diabetes mellitus (HCC)     Hypertension     Kidney stone     Renal calculi     Last assessed: 1/11/18      Past Surgical History:   Procedure Laterality Date    CYSTOSCOPY W/ URETERAL STENT PLACEMENT  02/04/2014    CYSTOSCOPY W/ URETERAL STENT REMOVAL  02/11/2014    w/urteroscopy w/removal of calculus    KNEE SURGERY Right     STONE ANALYSIS (HISTORICAL)      TONSILECTOMY AND ADNOIDECTOMY      TONSILLECTOMY        Family History   Problem Relation Age of Onset    Diabetes Mother         Mellitus    Hypertension Mother     Stroke Mother         Syndrome    Hypertension Father       Social History     Tobacco Use    Smoking status: Never     Passive exposure: Never    Smokeless tobacco: Current    Tobacco comments:     Zin 2 mg   Vaping Use    Vaping status: Never Used   Substance Use Topics    Alcohol use: Not Currently     Alcohol/week: 0.0 standard drinks of alcohol     Comment: Per [employer]: Occasional alcohol use    Drug use: No      E-Cigarette/Vaping     E-Cigarette Use Never User       E-Cigarette/Vaping Substances    Nicotine No     THC No     CBD No     Flavoring No     Other No     Unknown No       I have reviewed and agree with the history as documented.     54-year-old male history of dental caries presenting for evaluation of left lower dental pain.  Patient reports that several days ago he had a chronically decayed tooth which cracked further.  He states that he has developed pain and swelling at the gum.  He is concerned about infection.      Dental Pain  Associated symptoms: no facial swelling and no fever        Review of Systems   Constitutional:  Negative for chills and fever.   HENT:  Positive for dental problem. Negative for ear pain, facial swelling and sore throat.    Eyes:  Negative for pain and visual disturbance.   Respiratory:  Negative for cough and shortness of breath.    Cardiovascular:  Negative for chest pain and palpitations.   Gastrointestinal:  Negative for abdominal pain, diarrhea, nausea and vomiting.   Genitourinary:  Negative for dysuria and hematuria.   Musculoskeletal:  Negative for arthralgias and back pain.   Skin:  Negative for color change and rash.   Neurological:  Negative for seizures and syncope.   All other systems reviewed and are negative.          Objective       ED Triage Vitals [03/28/25 1933]   Temperature Pulse Blood Pressure Respirations SpO2 Patient Position - Orthostatic VS   (!) 96.9 °F (36.1 °C) 78 163/95 18 99 % Sitting      Temp Source Heart Rate Source BP Location FiO2 (%) Pain Score    Temporal Monitor Right arm -- 10 - Worst Possible Pain      Vitals      Date and Time Temp Pulse SpO2 Resp BP Pain Score FACES Pain Rating User   03/28/25 1933 96.9 °F (36.1 °C) 78 99 % 18 163/95 10 - Worst Possible Pain -- BL            Physical Exam  Vitals and nursing note reviewed. Exam conducted with a chaperone present.   Constitutional:       General: He is not in acute distress.     Appearance: He is well-developed.  He is obese.   HENT:      Head: Normocephalic and atraumatic.      Right Ear: External ear normal.      Left Ear: External ear normal.      Nose: Nose normal.      Mouth/Throat:      Mouth: Mucous membranes are moist.      Dentition: Abnormal dentition. Dental tenderness, gingival swelling and dental caries present. No dental abscesses.      Pharynx: Oropharynx is clear. Uvula midline.      Comments: No Ronald angina.  Chronic dental decay involving left lower first molar.  Mild surrounding gingival swelling.  No gingival abscess.  No trismus.  Eyes:      Conjunctiva/sclera: Conjunctivae normal.   Cardiovascular:      Rate and Rhythm: Normal rate and regular rhythm.      Heart sounds: No murmur heard.  Pulmonary:      Effort: Pulmonary effort is normal. No respiratory distress.      Breath sounds: Normal breath sounds.   Abdominal:      Palpations: Abdomen is soft.      Tenderness: There is no abdominal tenderness.   Musculoskeletal:         General: No swelling.      Cervical back: Neck supple.   Skin:     General: Skin is warm and dry.      Capillary Refill: Capillary refill takes less than 2 seconds.   Neurological:      Mental Status: He is alert.   Psychiatric:         Mood and Affect: Mood normal.         Results Reviewed       None            No orders to display       Procedures    ED Medication and Procedure Management   Prior to Admission Medications   Prescriptions Last Dose Informant Patient Reported? Taking?   Trulicity 1.5 MG/0.5ML SOAJ   Yes No   Sig: INJECT 0.5ML (1.5MG TOTAL) UNDER THE SKIN ONCE EVERY 7 DAYS   Patient not taking: Reported on 2024   Trulicity 1.5 MG/0.5ML SOAJ Past Week  No Yes   Sig: INJECT 0.5ML (1.5MG TOTAL) UNDER THE SKIN ONCE EVERY 7 DAYS   apixaban (Eliquis) 5 mg 3/28/2025  No Yes   Sig: Take 1 tablet by mouth twice daily   fluticasone (FLONASE) 50 mcg/act nasal spray   No No   Si spray into each nostril daily   ipratropium-albuterol (DUO-NEB) 0.5-2.5 mg/3 mL  nebulizer solution   No No   Sig: Take 3 mL by nebulization 4 (four) times a day   losartan (COZAAR) 25 mg tablet 3/28/2025  No Yes   Sig: Take 1 tablet by mouth once daily      Facility-Administered Medications Last Administration Doses Remaining   Lidocaine Viscous HCl (XYLOCAINE) 2 % mucosal solution 15 mL None recorded    aluminum-magnesium hydroxide-simethicone (MYLANTA) oral suspension 30 mL None recorded         Patient's Medications   Discharge Prescriptions    CHLORHEXIDINE (PERIDEX) 0.12 % SOLUTION    Apply 15 mL to the mouth or throat 2 (two) times a day       Start Date: 3/28/2025 End Date: --       Order Dose: 15 mL       Quantity: 120 mL    Refills: 0    CLINDAMYCIN (CLEOCIN) 300 MG CAPSULE    Take 1 capsule (300 mg total) by mouth 4 (four) times a day for 7 days       Start Date: 3/28/2025 End Date: 4/4/2025       Order Dose: 300 mg       Quantity: 28 capsule    Refills: 0    IBUPROFEN (MOTRIN) 600 MG TABLET    Take 1 tablet (600 mg total) by mouth every 6 (six) hours as needed for moderate pain       Start Date: 3/28/2025 End Date: --       Order Dose: 600 mg       Quantity: 30 tablet    Refills: 0    LIDOCAINE VISCOUS HCL (XYLOCAINE) 2 % MUCOSAL SOLUTION    Swish and spit 15 mL 4 (four) times a day as needed for mouth pain or discomfort       Start Date: 3/28/2025 End Date: --       Order Dose: 15 mL       Quantity: 100 mL    Refills: 0    OXYCODONE (ROXICODONE) 5 IMMEDIATE RELEASE TABLET    Take 1 tablet (5 mg total) by mouth every 6 (six) hours as needed for severe pain for up to 8 doses Max Daily Amount: 20 mg       Start Date: 3/28/2025 End Date: --       Order Dose: 5 mg       Quantity: 8 tablet    Refills: 0     No discharge procedures on file.  ED SEPSIS DOCUMENTATION   Time reflects when diagnosis was documented in both MDM as applicable and the Disposition within this note       Time User Action Codes Description Comment    3/28/2025  7:58 PM Leon Yanez Add [K02.9] Pain due to  dental caries     3/28/2025  7:58 PM Leon Yanez Add [K08.89] Toothache                  Leon Yanez,   03/28/25 2004

## 2025-05-16 ENCOUNTER — OFFICE VISIT (OUTPATIENT)
Dept: FAMILY MEDICINE CLINIC | Facility: CLINIC | Age: 55
End: 2025-05-16
Payer: COMMERCIAL

## 2025-05-16 VITALS
DIASTOLIC BLOOD PRESSURE: 80 MMHG | RESPIRATION RATE: 18 BRPM | HEIGHT: 73 IN | WEIGHT: 303 LBS | SYSTOLIC BLOOD PRESSURE: 130 MMHG | HEART RATE: 66 BPM | OXYGEN SATURATION: 97 % | TEMPERATURE: 96.7 F | BODY MASS INDEX: 40.16 KG/M2

## 2025-05-16 DIAGNOSIS — E11.9 TYPE 2 DIABETES MELLITUS WITHOUT COMPLICATION, WITHOUT LONG-TERM CURRENT USE OF INSULIN (HCC): Primary | ICD-10-CM

## 2025-05-16 DIAGNOSIS — E78.5 HYPERLIPIDEMIA, UNSPECIFIED HYPERLIPIDEMIA TYPE: ICD-10-CM

## 2025-05-16 DIAGNOSIS — E11.69 TYPE 2 DIABETES MELLITUS WITH OBESITY  (HCC): ICD-10-CM

## 2025-05-16 DIAGNOSIS — Q98.4 KLINEFELTER'S SYNDROME: ICD-10-CM

## 2025-05-16 DIAGNOSIS — I10 PRIMARY HYPERTENSION: ICD-10-CM

## 2025-05-16 DIAGNOSIS — I10 ESSENTIAL HYPERTENSION: ICD-10-CM

## 2025-05-16 DIAGNOSIS — K55.069 SUPERIOR MESENTERIC VEIN THROMBOSIS (HCC): ICD-10-CM

## 2025-05-16 DIAGNOSIS — E66.9 TYPE 2 DIABETES MELLITUS WITH OBESITY  (HCC): ICD-10-CM

## 2025-05-16 LAB — SL AMB POCT HEMOGLOBIN AIC: 6.5 (ref ?–6.5)

## 2025-05-16 PROCEDURE — 83036 HEMOGLOBIN GLYCOSYLATED A1C: CPT

## 2025-05-16 PROCEDURE — 99214 OFFICE O/P EST MOD 30 MIN: CPT

## 2025-05-16 RX ORDER — LOSARTAN POTASSIUM 50 MG/1
50 TABLET ORAL DAILY
Qty: 90 TABLET | Refills: 3 | Status: SHIPPED | OUTPATIENT
Start: 2025-05-16

## 2025-05-16 NOTE — PROGRESS NOTES
Diabetic Foot Exam    Patient's shoes and socks removed.    Right Foot/Ankle   Right Foot Inspection  Skin Exam: skin normal, skin intact and dry skin. No warmth, no callus, no erythema, no maceration, no abnormal color, no pre-ulcer, no ulcer and no callus.     Toe Exam: ROM and strength within normal limits.     Sensory   Monofilament testing: intact    Vascular  Capillary refills: < 3 seconds  The right PT pulse is 2+.     Left Foot/Ankle  Left Foot Inspection  Skin Exam: skin normal, skin intact and dry skin. No warmth, no erythema, no maceration, normal color, no pre-ulcer, no ulcer and no callus.     Toe Exam: ROM and strength within normal limits.     Sensory   Monofilament testing: intact    Vascular  Capillary refills: < 3 seconds  The left PT pulse is 2+.     Assign Risk Category  No deformity present  No loss of protective sensation  No weak pulses  Risk: 0

## 2025-05-16 NOTE — PROGRESS NOTES
":  Assessment & Plan  Type 2 diabetes mellitus without complication, without long-term current use of insulin (HCC)    Lab Results   Component Value Date    HGBA1C 5.7 08/12/2024       Orders:    POCT hemoglobin A1c    Diabetic foot exam; Future    Lipid panel; Future    Comprehensive metabolic panel; Future    TSH, 3rd generation with Free T4 reflex; Future    Superior mesenteric vein thrombosis (HCC)         Klinefelter's syndrome         Type 2 diabetes mellitus with obesity  (HCC)    Lab Results   Component Value Date    HGBA1C 5.7 08/12/2024            Primary hypertension         Essential hypertension    Orders:    losartan (COZAAR) 50 mg tablet; Take 1 tablet (50 mg total) by mouth daily    Lipid panel; Future    Comprehensive metabolic panel; Future    TSH, 3rd generation with Free T4 reflex; Future    Hyperlipidemia, unspecified hyperlipidemia type    Orders:    Lipid panel; Future    Comprehensive metabolic panel; Future    TSH, 3rd generation with Free T4 reflex; Future        History of Present Illness     Pedro Pablo Lopez is a 54 y.o. male   He has T2DM. His A1c is at gaol (6.5%) on a GLP-1.  He has side effects or hypoglycemia.  He is at low risk for hypoglycemic events.  His long-term control has been excellent and this should not interfere with Cdl.    His BP is mildly elevated, although he has not been compliant with a low salt diet.  He has no CP or SOB.  He has HA or vision changes.  He has s/s of CHF (orthopnea, PND, HUNG, etc.)  He is on a low dose of losartan and is agreeable to increasing his medication today.  On my repeat, his BP was 130/80.    We discussed the rationale for statin us in T2DM.      Review of Systems   All other systems reviewed and are negative.    Objective   /88 (BP Location: Right arm, Patient Position: Sitting, Cuff Size: Large)   Pulse 66   Temp (!) 96.7 °F (35.9 °C) (Temporal)   Resp 18   Ht 6' 1\" (1.854 m)   Wt (!) 137 kg (303 lb) Comment: Provided by pt  " SpO2 97%   BMI 39.98 kg/m²      Physical Exam  Vitals and nursing note reviewed.   Constitutional:       Appearance: Normal appearance. He is obese.     Cardiovascular:      Rate and Rhythm: Normal rate and regular rhythm.      Pulses: Normal pulses.      Heart sounds: Normal heart sounds.   Pulmonary:      Effort: Pulmonary effort is normal.      Breath sounds: Normal breath sounds.   Abdominal:      General: Abdomen is flat.      Palpations: Abdomen is soft.     Musculoskeletal:         General: Normal range of motion.      Cervical back: Normal range of motion and neck supple.     Skin:     General: Skin is warm and dry.      Capillary Refill: Capillary refill takes less than 2 seconds.     Neurological:      General: No focal deficit present.      Mental Status: He is alert and oriented to person, place, and time. Mental status is at baseline.     Psychiatric:         Mood and Affect: Mood normal.         Behavior: Behavior normal.         Thought Content: Thought content normal.         Judgment: Judgment normal.

## 2025-06-07 ENCOUNTER — APPOINTMENT (OUTPATIENT)
Dept: LAB | Facility: HOSPITAL | Age: 55
End: 2025-06-07
Payer: COMMERCIAL

## 2025-06-07 DIAGNOSIS — E66.9 TYPE 2 DIABETES MELLITUS WITH OBESITY  (HCC): ICD-10-CM

## 2025-06-07 DIAGNOSIS — I10 ESSENTIAL HYPERTENSION: ICD-10-CM

## 2025-06-07 DIAGNOSIS — E78.5 HYPERLIPIDEMIA, UNSPECIFIED HYPERLIPIDEMIA TYPE: ICD-10-CM

## 2025-06-07 DIAGNOSIS — E11.9 TYPE 2 DIABETES MELLITUS WITHOUT COMPLICATION, WITHOUT LONG-TERM CURRENT USE OF INSULIN (HCC): ICD-10-CM

## 2025-06-07 DIAGNOSIS — E11.69 TYPE 2 DIABETES MELLITUS WITH OBESITY  (HCC): ICD-10-CM

## 2025-06-07 LAB
ALBUMIN SERPL BCG-MCNC: 4.1 G/DL (ref 3.5–5)
ALP SERPL-CCNC: 46 U/L (ref 34–104)
ALT SERPL W P-5'-P-CCNC: 16 U/L (ref 7–52)
ANION GAP SERPL CALCULATED.3IONS-SCNC: 5 MMOL/L (ref 4–13)
AST SERPL W P-5'-P-CCNC: 11 U/L (ref 13–39)
BILIRUB SERPL-MCNC: 1.22 MG/DL (ref 0.2–1)
BUN SERPL-MCNC: 18 MG/DL (ref 5–25)
CALCIUM SERPL-MCNC: 9.4 MG/DL (ref 8.4–10.2)
CHLORIDE SERPL-SCNC: 107 MMOL/L (ref 96–108)
CHOLEST SERPL-MCNC: 164 MG/DL (ref ?–200)
CO2 SERPL-SCNC: 29 MMOL/L (ref 21–32)
CREAT SERPL-MCNC: 0.93 MG/DL (ref 0.6–1.3)
EST. AVERAGE GLUCOSE BLD GHB EST-MCNC: 134 MG/DL
GFR SERPL CREATININE-BSD FRML MDRD: 92 ML/MIN/1.73SQ M
GLUCOSE P FAST SERPL-MCNC: 110 MG/DL (ref 65–99)
HBA1C MFR BLD: 6.3 %
HDLC SERPL-MCNC: 34 MG/DL
LDLC SERPL CALC-MCNC: 112 MG/DL (ref 0–100)
NONHDLC SERPL-MCNC: 130 MG/DL
POTASSIUM SERPL-SCNC: 4.8 MMOL/L (ref 3.5–5.3)
PROT SERPL-MCNC: 7.2 G/DL (ref 6.4–8.4)
SODIUM SERPL-SCNC: 141 MMOL/L (ref 135–147)
TRIGL SERPL-MCNC: 90 MG/DL (ref ?–150)
TSH SERPL DL<=0.05 MIU/L-ACNC: 2.21 UIU/ML (ref 0.45–4.5)

## 2025-06-07 PROCEDURE — 80061 LIPID PANEL: CPT

## 2025-06-07 PROCEDURE — 83036 HEMOGLOBIN GLYCOSYLATED A1C: CPT

## 2025-06-07 PROCEDURE — 80053 COMPREHEN METABOLIC PANEL: CPT

## 2025-06-07 PROCEDURE — 36415 COLL VENOUS BLD VENIPUNCTURE: CPT

## 2025-06-07 PROCEDURE — 84443 ASSAY THYROID STIM HORMONE: CPT

## 2025-06-09 ENCOUNTER — RESULTS FOLLOW-UP (OUTPATIENT)
Dept: FAMILY MEDICINE CLINIC | Facility: CLINIC | Age: 55
End: 2025-06-09

## 2025-06-28 ENCOUNTER — DOCTOR'S OFFICE (OUTPATIENT)
Dept: URBAN - NONMETROPOLITAN AREA CLINIC 1 | Facility: CLINIC | Age: 55
Setting detail: OPHTHALMOLOGY
End: 2025-06-28
Payer: COMMERCIAL

## 2025-06-28 DIAGNOSIS — H52.03: ICD-10-CM

## 2025-06-28 DIAGNOSIS — H52.4: ICD-10-CM

## 2025-06-28 DIAGNOSIS — H52.223: ICD-10-CM

## 2025-06-28 PROBLEM — H25.13 CATARACT NUCLEAR SCLEROSIS; BOTH EYES: Status: ACTIVE | Noted: 2025-06-28

## 2025-06-28 PROBLEM — H31.011 MACULAR SCARS OF RETINA, OTHER; RIGHT EYE: Status: ACTIVE | Noted: 2025-06-28

## 2025-06-28 PROBLEM — H10.13 ALLERGIC CONJUNCTIVITS; BOTH EYES: Status: ACTIVE | Noted: 2025-06-28

## 2025-06-28 LAB
LEFT EYE DIABETIC RETINOPATHY: NORMAL
RIGHT EYE DIABETIC RETINOPATHY: NORMAL

## 2025-06-28 PROCEDURE — 92014 COMPRE OPH EXAM EST PT 1/>: CPT

## 2025-06-28 PROCEDURE — 92015 DETERMINE REFRACTIVE STATE: CPT

## 2025-06-28 ASSESSMENT — REFRACTION_AUTOREFRACTION
OS_SPHERE: +2.00
OD_AXIS: 094
OD_CYLINDER: -0.25
OS_AXIS: 066
OD_SPHERE: +2.75
OS_CYLINDER: -0.50

## 2025-06-28 ASSESSMENT — REFRACTION_MANIFEST
OS_ADD: +2.25
OD_AXIS: 170
OD_SPHERE: +1.75
OS_VA1: 20/20
OD_ADD: +2.25
OS_AXIS: 035
OU_VA: 20/20
OD_VA2: 20/20
OS_SPHERE: +1.50
OD_VA1: 20/20
OD_CYLINDER: -0.25
OS_VA2: 20/20
OS_CYLINDER: -0.50

## 2025-06-28 ASSESSMENT — REFRACTION_CURRENTRX
OS_ADD: +2.25
OD_AXIS: 104
OS_AXIS: 046
OS_CYLINDER: -0.50
OD_SPHERE: +1.75
OD_VPRISM_DIRECTION: PROGS
OD_OVR_VA: 20/
OS_SPHERE: +1.50
OD_CYLINDER: SPH
OS_OVR_VA: 20/
OS_VPRISM_DIRECTION: PROGS
OD_ADD: +2.25

## 2025-06-28 ASSESSMENT — VISUAL ACUITY
OS_BCVA: 20/50+1
OD_BCVA: 20/30+1

## 2025-06-28 ASSESSMENT — CONFRONTATIONAL VISUAL FIELD TEST (CVF)
OD_FINDINGS: FULL
OS_FINDINGS: FULL

## 2025-06-28 ASSESSMENT — TONOMETRY
OS_IOP_MMHG: 18
OD_IOP_MMHG: 18

## 2025-08-02 DIAGNOSIS — K55.069 SUPERIOR MESENTERIC VEIN THROMBOSIS (HCC): ICD-10-CM

## 2025-08-04 RX ORDER — APIXABAN 5 MG/1
5 TABLET, FILM COATED ORAL 2 TIMES DAILY
Qty: 180 TABLET | Refills: 1 | Status: SHIPPED | OUTPATIENT
Start: 2025-08-04